# Patient Record
Sex: FEMALE | Race: WHITE | Employment: OTHER
[De-identification: names, ages, dates, MRNs, and addresses within clinical notes are randomized per-mention and may not be internally consistent; named-entity substitution may affect disease eponyms.]

---

## 2017-01-05 PROBLEM — Z86.010 HISTORY OF ADENOMATOUS POLYP OF COLON: Status: ACTIVE | Noted: 2017-01-05

## 2017-01-05 PROBLEM — Z86.0101 HISTORY OF ADENOMATOUS POLYP OF COLON: Status: ACTIVE | Noted: 2017-01-05

## 2017-01-23 ENCOUNTER — OFFICE VISIT (OUTPATIENT)
Dept: FAMILY MEDICINE CLINIC | Facility: CLINIC | Age: 62
End: 2017-01-23

## 2017-01-23 VITALS
HEART RATE: 68 BPM | DIASTOLIC BLOOD PRESSURE: 80 MMHG | RESPIRATION RATE: 18 BRPM | WEIGHT: 214 LBS | SYSTOLIC BLOOD PRESSURE: 138 MMHG | BODY MASS INDEX: 32.43 KG/M2 | HEIGHT: 68 IN

## 2017-01-23 DIAGNOSIS — F41.9 ANXIETY: ICD-10-CM

## 2017-01-23 DIAGNOSIS — Z98.890 S/P COLONOSCOPY WITH POLYPECTOMY: Primary | ICD-10-CM

## 2017-01-23 DIAGNOSIS — F17.200 SMOKING: ICD-10-CM

## 2017-01-23 PROCEDURE — 99214 OFFICE O/P EST MOD 30 MIN: CPT | Performed by: NURSE PRACTITIONER

## 2017-01-23 RX ORDER — TOLTERODINE TARTRATE 2 MG/1
2 TABLET, EXTENDED RELEASE ORAL 2 TIMES DAILY
Qty: 180 TABLET | Refills: 1 | Status: SHIPPED | OUTPATIENT
Start: 2017-01-23 | End: 2017-07-08 | Stop reason: SDUPTHER

## 2017-01-24 RX ORDER — ALPRAZOLAM 0.5 MG/1
TABLET ORAL
Qty: 90 TABLET | Refills: 0 | Status: SHIPPED | OUTPATIENT
Start: 2017-01-24 | End: 2017-04-24 | Stop reason: SDUPTHER

## 2017-01-24 ASSESSMENT — ENCOUNTER SYMPTOMS
COUGH: 0
DIARRHEA: 0
EYES NEGATIVE: 1
ABDOMINAL DISTENTION: 0
SHORTNESS OF BREATH: 0
SORE THROAT: 0
CONSTIPATION: 0

## 2017-04-11 ENCOUNTER — NURSE ONLY (OUTPATIENT)
Dept: PRIMARY CARE CLINIC | Age: 62
End: 2017-04-11
Payer: COMMERCIAL

## 2017-04-11 VITALS
HEIGHT: 68 IN | TEMPERATURE: 98.4 F | WEIGHT: 219.9 LBS | BODY MASS INDEX: 33.33 KG/M2 | RESPIRATION RATE: 16 BRPM | HEART RATE: 85 BPM | OXYGEN SATURATION: 95 % | DIASTOLIC BLOOD PRESSURE: 79 MMHG | SYSTOLIC BLOOD PRESSURE: 113 MMHG

## 2017-04-11 DIAGNOSIS — Z23 NEED FOR TETANUS BOOSTER: Primary | ICD-10-CM

## 2017-04-11 PROCEDURE — 90471 IMMUNIZATION ADMIN: CPT | Performed by: NURSE PRACTITIONER

## 2017-04-11 PROCEDURE — 90715 TDAP VACCINE 7 YRS/> IM: CPT | Performed by: NURSE PRACTITIONER

## 2017-04-20 DIAGNOSIS — F41.9 ANXIETY: ICD-10-CM

## 2017-04-24 RX ORDER — AMLODIPINE BESYLATE 10 MG/1
10 TABLET ORAL DAILY
Qty: 90 TABLET | Refills: 1 | Status: SHIPPED | OUTPATIENT
Start: 2017-04-24 | End: 2017-07-12 | Stop reason: SDUPTHER

## 2017-04-24 RX ORDER — ALPRAZOLAM 0.5 MG/1
TABLET ORAL
Qty: 90 TABLET | Refills: 0 | Status: SHIPPED | OUTPATIENT
Start: 2017-04-24 | End: 2017-07-12 | Stop reason: SDUPTHER

## 2017-05-30 ENCOUNTER — OFFICE VISIT (OUTPATIENT)
Dept: PRIMARY CARE CLINIC | Age: 62
End: 2017-05-30
Payer: COMMERCIAL

## 2017-05-30 VITALS
HEART RATE: 72 BPM | RESPIRATION RATE: 20 BRPM | OXYGEN SATURATION: 97 % | SYSTOLIC BLOOD PRESSURE: 170 MMHG | BODY MASS INDEX: 32.43 KG/M2 | WEIGHT: 214 LBS | TEMPERATURE: 98.3 F | DIASTOLIC BLOOD PRESSURE: 107 MMHG | HEIGHT: 68 IN

## 2017-05-30 DIAGNOSIS — J01.00 ACUTE NON-RECURRENT MAXILLARY SINUSITIS: Primary | ICD-10-CM

## 2017-05-30 DIAGNOSIS — T36.95XA ANTIBIOTIC-INDUCED YEAST INFECTION: ICD-10-CM

## 2017-05-30 DIAGNOSIS — B37.9 ANTIBIOTIC-INDUCED YEAST INFECTION: ICD-10-CM

## 2017-05-30 PROCEDURE — 99213 OFFICE O/P EST LOW 20 MIN: CPT | Performed by: NURSE PRACTITIONER

## 2017-05-30 RX ORDER — FLUCONAZOLE 150 MG/1
TABLET ORAL
Qty: 2 TABLET | Refills: 0 | Status: SHIPPED | OUTPATIENT
Start: 2017-05-30 | End: 2017-07-12 | Stop reason: ALTCHOICE

## 2017-05-30 RX ORDER — AMOXICILLIN AND CLAVULANATE POTASSIUM 875; 125 MG/1; MG/1
1 TABLET, FILM COATED ORAL 2 TIMES DAILY
Qty: 20 TABLET | Refills: 0 | Status: SHIPPED | OUTPATIENT
Start: 2017-05-30 | End: 2017-06-09

## 2017-05-30 RX ORDER — FLUTICASONE PROPIONATE 50 MCG
SPRAY, SUSPENSION (ML) NASAL
Qty: 1 BOTTLE | Refills: 0 | Status: SHIPPED | OUTPATIENT
Start: 2017-05-30 | End: 2017-07-12 | Stop reason: ALTCHOICE

## 2017-05-30 ASSESSMENT — ENCOUNTER SYMPTOMS
HEARTBURN: 0
DIARRHEA: 0
RHINORRHEA: 1
VOMITING: 0
COUGH: 1
SINUS PRESSURE: 1
SORE THROAT: 0
NAUSEA: 0
WHEEZING: 0
HEMOPTYSIS: 0
SHORTNESS OF BREATH: 0

## 2017-07-12 ENCOUNTER — OFFICE VISIT (OUTPATIENT)
Dept: FAMILY MEDICINE CLINIC | Age: 62
End: 2017-07-12
Payer: COMMERCIAL

## 2017-07-12 VITALS
BODY MASS INDEX: 32.43 KG/M2 | OXYGEN SATURATION: 97 % | DIASTOLIC BLOOD PRESSURE: 80 MMHG | HEART RATE: 77 BPM | RESPIRATION RATE: 18 BRPM | WEIGHT: 214 LBS | HEIGHT: 68 IN | TEMPERATURE: 98.3 F | SYSTOLIC BLOOD PRESSURE: 136 MMHG

## 2017-07-12 DIAGNOSIS — Z11.59 NEED FOR HEPATITIS C SCREENING TEST: ICD-10-CM

## 2017-07-12 DIAGNOSIS — I10 ESSENTIAL HYPERTENSION: Primary | ICD-10-CM

## 2017-07-12 DIAGNOSIS — F41.9 ANXIETY: ICD-10-CM

## 2017-07-12 DIAGNOSIS — L93.0 DISCOID LUPUS: ICD-10-CM

## 2017-07-12 DIAGNOSIS — Z13.220 SCREENING CHOLESTEROL LEVEL: ICD-10-CM

## 2017-07-12 PROCEDURE — 99214 OFFICE O/P EST MOD 30 MIN: CPT | Performed by: NURSE PRACTITIONER

## 2017-07-12 RX ORDER — TOLTERODINE TARTRATE 2 MG/1
2 TABLET, EXTENDED RELEASE ORAL 2 TIMES DAILY
Qty: 180 TABLET | Refills: 1 | Status: SHIPPED | OUTPATIENT
Start: 2017-07-12 | End: 2017-10-04 | Stop reason: SDUPTHER

## 2017-07-12 RX ORDER — ALPRAZOLAM 0.5 MG/1
TABLET ORAL
Qty: 90 TABLET | Refills: 0 | Status: SHIPPED | OUTPATIENT
Start: 2017-07-12 | End: 2017-10-04 | Stop reason: SDUPTHER

## 2017-07-12 RX ORDER — AMLODIPINE BESYLATE 10 MG/1
10 TABLET ORAL DAILY
Qty: 90 TABLET | Refills: 1 | Status: CANCELLED | OUTPATIENT
Start: 2017-07-12

## 2017-07-12 RX ORDER — AMLODIPINE BESYLATE 10 MG/1
10 TABLET ORAL DAILY
Qty: 90 TABLET | Refills: 1 | Status: SHIPPED | OUTPATIENT
Start: 2017-07-12 | End: 2017-10-04 | Stop reason: SDUPTHER

## 2017-07-12 RX ORDER — TOLTERODINE TARTRATE 2 MG/1
TABLET, EXTENDED RELEASE ORAL
Qty: 180 TABLET | Refills: 1 | Status: CANCELLED | OUTPATIENT
Start: 2017-07-12

## 2017-07-12 RX ORDER — ALPRAZOLAM 0.5 MG/1
TABLET ORAL
Qty: 90 TABLET | Refills: 0 | Status: CANCELLED | OUTPATIENT
Start: 2017-07-12

## 2017-07-12 ASSESSMENT — PATIENT HEALTH QUESTIONNAIRE - PHQ9
SUM OF ALL RESPONSES TO PHQ9 QUESTIONS 1 & 2: 0
2. FEELING DOWN, DEPRESSED OR HOPELESS: 0
SUM OF ALL RESPONSES TO PHQ QUESTIONS 1-9: 0
1. LITTLE INTEREST OR PLEASURE IN DOING THINGS: 0

## 2017-07-12 ASSESSMENT — ENCOUNTER SYMPTOMS
CHEST TIGHTNESS: 0
WHEEZING: 0
RHINORRHEA: 0
SINUS PRESSURE: 0
SORE THROAT: 0
COUGH: 1

## 2017-07-24 ENCOUNTER — HOSPITAL ENCOUNTER (OUTPATIENT)
Age: 62
Discharge: HOME | End: 2017-07-24
Payer: COMMERCIAL

## 2017-07-24 DIAGNOSIS — Z13.220 SCREENING CHOLESTEROL LEVEL: ICD-10-CM

## 2017-07-24 DIAGNOSIS — Z11.59 NEED FOR HEPATITIS C SCREENING TEST: ICD-10-CM

## 2017-07-24 DIAGNOSIS — L93.0 DISCOID LUPUS: ICD-10-CM

## 2017-07-24 LAB
CHOLESTEROL/HDL RATIO: 2.6
CHOLESTEROL: 211 MG/DL
HDLC SERPL-MCNC: 80 MG/DL
LDL CHOLESTEROL: 113 MG/DL (ref 0–130)
PATIENT FASTING?: YES
TRIGL SERPL-MCNC: 89 MG/DL
VLDLC SERPL CALC-MCNC: ABNORMAL MG/DL (ref 1–30)

## 2017-07-24 PROCEDURE — 86803 HEPATITIS C AB TEST: CPT

## 2017-07-24 PROCEDURE — 36415 COLL VENOUS BLD VENIPUNCTURE: CPT

## 2017-07-24 PROCEDURE — 80061 LIPID PANEL: CPT

## 2017-07-25 LAB — HEPATITIS C ANTIBODY: NONREACTIVE

## 2017-07-26 ENCOUNTER — TELEPHONE (OUTPATIENT)
Dept: FAMILY MEDICINE CLINIC | Age: 62
End: 2017-07-26

## 2017-07-26 DIAGNOSIS — Z51.81 ENCOUNTER FOR MONITORING STATIN THERAPY: Primary | ICD-10-CM

## 2017-07-26 DIAGNOSIS — E78.00 HYPERCHOLESTEROLEMIA: ICD-10-CM

## 2017-07-26 DIAGNOSIS — Z79.899 ENCOUNTER FOR MONITORING STATIN THERAPY: Primary | ICD-10-CM

## 2017-07-26 RX ORDER — ATORVASTATIN CALCIUM 10 MG/1
10 TABLET, FILM COATED ORAL DAILY
Qty: 90 TABLET | Refills: 0 | Status: SHIPPED | OUTPATIENT
Start: 2017-07-26 | End: 2018-05-16 | Stop reason: ALTCHOICE

## 2017-10-04 ENCOUNTER — OFFICE VISIT (OUTPATIENT)
Dept: FAMILY MEDICINE CLINIC | Age: 62
End: 2017-10-04
Payer: COMMERCIAL

## 2017-10-04 VITALS
WEIGHT: 213 LBS | DIASTOLIC BLOOD PRESSURE: 76 MMHG | OXYGEN SATURATION: 98 % | HEIGHT: 68 IN | HEART RATE: 65 BPM | BODY MASS INDEX: 32.28 KG/M2 | SYSTOLIC BLOOD PRESSURE: 134 MMHG | RESPIRATION RATE: 18 BRPM

## 2017-10-04 DIAGNOSIS — Z23 INFLUENZA VACCINE NEEDED: ICD-10-CM

## 2017-10-04 DIAGNOSIS — Z13.0 SCREENING, ANEMIA, DEFICIENCY, IRON: ICD-10-CM

## 2017-10-04 DIAGNOSIS — L93.0 DISCOID LUPUS: ICD-10-CM

## 2017-10-04 DIAGNOSIS — Z80.3 FAMILY HISTORY OF BREAST CANCER: ICD-10-CM

## 2017-10-04 DIAGNOSIS — Z12.39 SCREENING FOR BREAST CANCER: ICD-10-CM

## 2017-10-04 DIAGNOSIS — I10 ESSENTIAL HYPERTENSION: Primary | ICD-10-CM

## 2017-10-04 DIAGNOSIS — F41.9 ANXIETY: ICD-10-CM

## 2017-10-04 PROCEDURE — 90686 IIV4 VACC NO PRSV 0.5 ML IM: CPT | Performed by: NURSE PRACTITIONER

## 2017-10-04 PROCEDURE — 90471 IMMUNIZATION ADMIN: CPT | Performed by: NURSE PRACTITIONER

## 2017-10-04 PROCEDURE — 99214 OFFICE O/P EST MOD 30 MIN: CPT | Performed by: NURSE PRACTITIONER

## 2017-10-04 RX ORDER — AMLODIPINE BESYLATE 10 MG/1
10 TABLET ORAL DAILY
Qty: 90 TABLET | Refills: 1 | Status: SHIPPED | OUTPATIENT
Start: 2017-10-04 | End: 2018-03-20 | Stop reason: SDUPTHER

## 2017-10-04 RX ORDER — TOLTERODINE TARTRATE 2 MG/1
2 TABLET, EXTENDED RELEASE ORAL 2 TIMES DAILY
Qty: 180 TABLET | Refills: 1 | Status: SHIPPED | OUTPATIENT
Start: 2017-10-04 | End: 2018-03-20 | Stop reason: SDUPTHER

## 2017-10-04 RX ORDER — IBUPROFEN 800 MG/1
800 TABLET ORAL DAILY PRN
Qty: 90 TABLET | Refills: 1 | Status: SHIPPED | OUTPATIENT
Start: 2017-10-04 | End: 2018-03-20 | Stop reason: SDUPTHER

## 2017-10-04 RX ORDER — ALPRAZOLAM 0.5 MG/1
TABLET ORAL
Qty: 90 TABLET | Refills: 0 | Status: SHIPPED | OUTPATIENT
Start: 2017-10-04 | End: 2018-01-09 | Stop reason: SDUPTHER

## 2017-10-04 ASSESSMENT — ENCOUNTER SYMPTOMS
EYE PAIN: 0
COUGH: 0
BACK PAIN: 0
SINUS PRESSURE: 0
EYES NEGATIVE: 1
WHEEZING: 0
ABDOMINAL DISTENTION: 0
SHORTNESS OF BREATH: 0

## 2017-10-04 NOTE — PROGRESS NOTES
After obtaining consent, and per orders of [facility], injection of Fluzone given in Right Deltoid by Tam Syed. Patient instructed to remain in clinic for 20 minutes afterwards, and to report any adverse reaction to me immediately. No reaction noted.

## 2017-10-04 NOTE — PROGRESS NOTES
[facility] PHYSICIANS  [facility] PRIMARY CARE OF [place]  [address] [address]  Dept: [phone]  Dept Fax: [phone]    Last encounter Visit date not found  Chronic Disease Visit Information    BP Readings from Last 3 Encounters:   10/04/17 134/76   07/12/17 136/80   05/30/17 (!) 170/107          LDL Cholesterol (mg/dL)   Date Value   07/24/2017 113     HDL (mg/dL)   Date Value   07/24/2017 80     BUN (mg/dL)   Date Value   04/06/2016 26 (H)     CREATININE (mg/dL)   Date Value   04/06/2016 0.85     Glucose (mg/dL)   Date Value   04/06/2016 99            Have you changed or started any medications since your last visit including any over-the-counter medicines, vitamins, or herbal medicines? no   Are you having any side effects from any of your medications? -  no  Have you stopped taking any of your medications? Is so, why? -  yes - Statin     Have you seen any other physician or provider since your last visit? Yes - Records Obtained  Have you had any other diagnostic tests since your last visit? No  Have you been seen in the emergency room and/or had an admission to a hospital since we last saw you? No  Have you had your annual diabetic retinal (eye) exam? No  Have you had your routine dental cleaning in the past 6 months? yes - Henok Pichardo and justynan     Have you activated your [employer] account? If not, what are your barriers?  No: Discussed      Patient Care Team:  Debbe Leventhal, NP as PCP - General (Certified Nurse Practitioner)         Medical History Review  Past Medical, Family, and Social History reviewed and does contribute to the patient presenting condition    Health Maintenance   Topic Date Due    Cervical cancer screen  06/16/2017    HIV screen  08/19/2020 (Originally 9/20/1970)    Breast cancer screen  08/05/2018    Colon cancer screen colonoscopy  12/16/2021    Lipid screen  07/24/2022    DTaP/Tdap/Td vaccine (2 - Td) 04/12/2027    Zostavax vaccine  Completed    Flu Alcohol Abuse Maternal Grandfather     Diabetes Paternal Grandfather     Other Maternal Aunt      All maternal aunts had heart disease       Social History   Substance Use Topics    Smoking status: Current Every Day Smoker     Packs/day: 0.50     Types: Cigarettes    Smokeless tobacco: Never Used    Alcohol use Yes      Comment: occasionally      Current Outpatient Prescriptions   Medication Sig Dispense Refill    amLODIPine (NORVASC) 10 MG tablet Take 1 tablet by mouth daily 90 tablet 1    ibuprofen (ADVIL;MOTRIN) 800 MG tablet Take 1 tablet by mouth daily as needed for Pain 90 tablet 1    tolterodine (DETROL) 2 MG tablet Take 1 tablet by mouth 2 times daily 180 tablet 1    ALPRAZolam (XANAX) 0.5 MG tablet take 1 tablet by mouth daily if needed anxiety 90 tablet 0    NONFORMULARY Indications: Womens 1 a day 50 plus.  NONFORMULARY Indications: Natures way hair skin and nails  2500 mcg 1 a day.  vitamin B-12 (CYANOCOBALAMIN) 500 MCG tablet Take 500 mcg by mouth daily      Polypodium Leucotomos (HELIOCARE PO) Take 1 tablet by mouth daily      MIRVASO 0.33 % GEL       Magnesium 400 MG CAPS Take  by mouth daily.  Loratadine 10 MG CAPS Take  by mouth daily as needed.  aspirin 81 MG tablet Take 81 mg by mouth daily.  hydroxychloroquine (PLAQUENIL) 200 MG tablet Take 200 mg by mouth daily.  fish oil-omega-3 fatty acids 1000 MG capsule Take 1 g by mouth 2 times daily.  vitamin D (CHOLECALCIFEROL) 400 UNITS TABS tablet Take  by mouth daily. 2000 mg once daily      calcium-vitamin D (OSCAL) 250-125 MG-UNIT per tablet Take 1 tablet by mouth daily. Vit D and Vit K 750 mg daily      vitamin E 1000 UNITS capsule Take  by mouth daily.  400IU      atorvastatin (LIPITOR) 10 MG tablet Take 1 tablet by mouth daily 90 tablet 0    betamethasone valerate (VALISONE) 0.1 % ointment Apply topically 2 times daily prn poison ivy 15 g 0    Ascorbic Acid (VITAMIN C) 500 MG tablet Take 500 anemia, deficiency, iron  Due to plaquenil use   Denies blood in stool or urine  - CBC Auto Differential; Future    6. Influenza vaccine needed  Given today  - INFLUENZA, QUADV, 3 YRS AND OLDER, IM, PF, PREFILL SYR OR SDV, 0.5ML (FLUZONE QUADV, PF)    7. Discoid lupus  Monitored by rheumatologist doing well      See in 3 months for monitoring of benzo. Completed Refills   Requested Prescriptions     Signed Prescriptions Disp Refills    amLODIPine (NORVASC) 10 MG tablet 90 tablet 1     Sig: Take 1 tablet by mouth daily    ibuprofen (ADVIL;MOTRIN) 800 MG tablet 90 tablet 1     Sig: Take 1 tablet by mouth daily as needed for Pain    tolterodine (DETROL) 2 MG tablet 180 tablet 1     Sig: Take 1 tablet by mouth 2 times daily    ALPRAZolam (XANAX) 0.5 MG tablet 90 tablet 0     Sig: take 1 tablet by mouth daily if needed anxiety     No Follow-up on file.   Orders Placed This Encounter   Medications    amLODIPine (NORVASC) 10 MG tablet     Sig: Take 1 tablet by mouth daily     Dispense:  90 tablet     Refill:  1    ibuprofen (ADVIL;MOTRIN) 800 MG tablet     Sig: Take 1 tablet by mouth daily as needed for Pain     Dispense:  90 tablet     Refill:  1    tolterodine (DETROL) 2 MG tablet     Sig: Take 1 tablet by mouth 2 times daily     Dispense:  180 tablet     Refill:  1    ALPRAZolam (XANAX) 0.5 MG tablet     Sig: take 1 tablet by mouth daily if needed anxiety     Dispense:  90 tablet     Refill:  0     Orders Placed This Encounter   Procedures    FADI DIGITAL SCREEN W OR WO CAD BILATERAL     Standing Status:   Future     Standing Expiration Date:   12/4/2018     Order Specific Question:   Reason for exam:     Answer:   screening    INFLUENZA, QUADV, 3 YRS AND OLDER, IM, PF, PREFILL SYR OR SDV, 0.5ML (FLUZONE QUADV, PF)    CBC Auto Differential     Standing Status:   Future     Standing Expiration Date:   10/4/2018         Sari  received counseling on the following healthy behaviors: nutrition, exercise, medication adherence and tobacco cessation  Reviewed prior labs and health maintenance. Continue current medications, diet and exercise. Discussed use, benefit, and side effects of prescribed medications. Barriers to medication compliance addressed. Patient given educational materials - see patient instructions. All patient questions answered. Patient voiced understanding.           Electronically signed by Tammi Chavez NP on 10/5/2017 at 1:44 PM

## 2017-10-12 ENCOUNTER — HOSPITAL ENCOUNTER (OUTPATIENT)
Dept: MAMMOGRAPHY | Age: 62
Discharge: HOME | End: 2017-10-12
Payer: COMMERCIAL

## 2017-10-12 DIAGNOSIS — Z12.39 SCREENING FOR BREAST CANCER: ICD-10-CM

## 2017-10-12 DIAGNOSIS — Z80.3 FAMILY HISTORY OF BREAST CANCER: ICD-10-CM

## 2017-10-12 PROCEDURE — G0202 SCR MAMMO BI INCL CAD: HCPCS

## 2017-12-12 ENCOUNTER — HOSPITAL ENCOUNTER (OUTPATIENT)
Age: 62
Discharge: HOME | End: 2017-12-12
Payer: COMMERCIAL

## 2017-12-12 DIAGNOSIS — Z13.0 SCREENING, ANEMIA, DEFICIENCY, IRON: ICD-10-CM

## 2017-12-12 LAB
ABSOLUTE EOS #: 0.1 K/UL (ref 0–0.4)
ABSOLUTE IMMATURE GRANULOCYTE: ABNORMAL K/UL (ref 0–0.3)
ABSOLUTE LYMPH #: 2 K/UL (ref 1–4.8)
ABSOLUTE MONO #: 0.8 K/UL (ref 0–1)
BASOPHILS # BLD: 0 % (ref 0–2)
BASOPHILS ABSOLUTE: 0 K/UL (ref 0–0.2)
DIFFERENTIAL TYPE: YES
EOSINOPHILS RELATIVE PERCENT: 1 % (ref 0–5)
HCT VFR BLD CALC: 41.3 % (ref 36–46)
HEMOGLOBIN: 13.9 G/DL (ref 12–16)
IMMATURE GRANULOCYTES: ABNORMAL %
LYMPHOCYTES # BLD: 22 % (ref 15–40)
MCH RBC QN AUTO: 31.2 PG (ref 26–34)
MCHC RBC AUTO-ENTMCNC: 33.7 G/DL (ref 31–37)
MCV RBC AUTO: 92.8 FL (ref 80–100)
MONOCYTES # BLD: 9 % (ref 4–8)
PDW BLD-RTO: 13.5 % (ref 12.1–15.2)
PLATELET # BLD: 223 K/UL (ref 140–450)
PLATELET ESTIMATE: ABNORMAL
PMV BLD AUTO: ABNORMAL FL (ref 6–12)
RBC # BLD: 4.45 M/UL (ref 4–5.2)
RBC # BLD: ABNORMAL 10*6/UL
SEG NEUTROPHILS: 68 % (ref 47–75)
SEGMENTED NEUTROPHILS ABSOLUTE COUNT: 6.3 K/UL (ref 2.5–7)
WBC # BLD: 9.3 K/UL (ref 3.5–11)
WBC # BLD: ABNORMAL 10*3/UL

## 2017-12-12 PROCEDURE — 85025 COMPLETE CBC W/AUTO DIFF WBC: CPT

## 2017-12-12 PROCEDURE — 36415 COLL VENOUS BLD VENIPUNCTURE: CPT

## 2017-12-19 ENCOUNTER — OFFICE VISIT (OUTPATIENT)
Dept: FAMILY MEDICINE CLINIC | Age: 62
End: 2017-12-19
Payer: COMMERCIAL

## 2017-12-19 VITALS
HEART RATE: 71 BPM | OXYGEN SATURATION: 96 % | BODY MASS INDEX: 32.74 KG/M2 | HEIGHT: 68 IN | DIASTOLIC BLOOD PRESSURE: 76 MMHG | WEIGHT: 216 LBS | SYSTOLIC BLOOD PRESSURE: 120 MMHG | RESPIRATION RATE: 18 BRPM

## 2017-12-19 DIAGNOSIS — I10 ESSENTIAL HYPERTENSION: Primary | ICD-10-CM

## 2017-12-19 DIAGNOSIS — E66.09 CLASS 1 OBESITY DUE TO EXCESS CALORIES WITHOUT SERIOUS COMORBIDITY WITH BODY MASS INDEX (BMI) OF 31.0 TO 31.9 IN ADULT: ICD-10-CM

## 2017-12-19 DIAGNOSIS — F41.9 ANXIETY: ICD-10-CM

## 2017-12-19 PROCEDURE — 3014F SCREEN MAMMO DOC REV: CPT | Performed by: NURSE PRACTITIONER

## 2017-12-19 PROCEDURE — G8484 FLU IMMUNIZE NO ADMIN: HCPCS | Performed by: NURSE PRACTITIONER

## 2017-12-19 PROCEDURE — 99213 OFFICE O/P EST LOW 20 MIN: CPT | Performed by: NURSE PRACTITIONER

## 2017-12-19 PROCEDURE — 3017F COLORECTAL CA SCREEN DOC REV: CPT | Performed by: NURSE PRACTITIONER

## 2017-12-19 PROCEDURE — G8417 CALC BMI ABV UP PARAM F/U: HCPCS | Performed by: NURSE PRACTITIONER

## 2017-12-19 PROCEDURE — G8427 DOCREV CUR MEDS BY ELIG CLIN: HCPCS | Performed by: NURSE PRACTITIONER

## 2017-12-19 PROCEDURE — 1036F TOBACCO NON-USER: CPT | Performed by: NURSE PRACTITIONER

## 2017-12-19 NOTE — PROGRESS NOTES
[facility] PHYSICIANS  [facility] PRIMARY CARE  [place]  [address] [place] [address] [place]  [address]  Dept: [phone]  Dept Fax: [phone]    Last encounter 10/4/2017  Visit Information    Have you changed or started any medications since your last visit including any over-the-counter medicines, vitamins, or herbal medicines? yes - CoQ 10 mg Biotin Bi 1000mg   Are you having any side effects from any of your medications? -  no  Have you stopped taking any of your medications? Is so, why? -  no    Have you seen any other physician or provider since your last visit? Yes - Records Obtained  Have you had any other diagnostic tests since your last visit? Yes - Records Obtained  Have you been seen in the emergency room and/or had an admission to a hospital since we last saw you? No  Have you had your routine dental cleaning in the past 6 months? yes - Dr Cesar Mazariegos     Have you activated your [employer] account? If not, what are your barriers? No: Discussed      Patient Care Team:  Chrissy Aviles NP as PCP - General (Certified Nurse Practitioner)    Medical History Review  Past Medical, Family, and Social History reviewed and does contribute to the patient presenting condition    Health Maintenance   Topic Date Due    Cervical cancer screen  06/16/2017    HIV screen  08/19/2020 (Originally 9/20/1970)    Breast cancer screen  10/12/2019    Colon cancer screen colonoscopy  12/16/2021    Lipid screen  07/24/2022    DTaP/Tdap/Td vaccine (2 - Td) 04/12/2027    Zostavax vaccine  Completed    Flu vaccine  Completed    Pneumococcal med risk  Completed    Hepatitis C screen  Completed       HPI:   Latrell Castro is a 57 y.o. female who presents today for her medical conditions/complaints as noted below. Latrell Castro is c/o of Check-Up (Medication check up)      HPI    DR. Lomax. Discoid lupus, Dr. Brandy Bustos dermatologist alessio. Eveline Never with eye exam recently.      Dr. Tosha Rodríguez right hand thumb fusion and lack tendons and arthritis. Wearing sling today but continues to participate and strengthening exercises at local gym    No smoking for 2 months, keeping self busy but for best bone health stopped. Going to gym routinely.      Dysphoric mood and anxiety treated with Xanax 0.5 mg daily patient is tolerating well with good control refills have been appropriate on ors review will continue daily dosing    Vitamin B12 supplement being taken due to low meat intake    Vitamin D deficiency with supplement daily    Hyperlipidemia with refusal to take Lipitor 10 mg once to try is much natural possible and is refusing statin at this time is aware that is at elevated risk and continues to modify with diet and exercise    Taking tolterodine for urinary incontinence which has had a good effect    Hypertension on amlodipine denies any headaches chest pain dizziness or shortness of breath        Past Medical History:   Diagnosis Date    Arthritis     follow with Dr. Rudolph Burns Discoid lupus     Hay fever     Hormone disorder     Hypertension     Lipoma 2009    Rosacea       Past Surgical History:   Procedure Laterality Date    BACK SURGERY  1988    L-5, [facility] @ [address]      COLONOSCOPY  12/16/2016    Dr. Seferino Pollack:  1 adenomatous polyp    FOOT SURGERY      HAMMER TOE SURGERY Right 09-23-14    2nd toe, bunion surgery great right toe    HYSTERECTOMY      LIPOMA RESECTION  2009       Family History   Problem Relation Age of Onset    Heart Disease Mother     High Cholesterol Mother     High Blood Pressure Mother     Heart Surgery Mother     Other Mother      Heart Cath     Heart Disease Father     Heart Failure Father     Alcohol Abuse Maternal Grandfather     Diabetes Paternal Grandfather     Other Maternal Aunt      All maternal aunts had heart disease       Social History   Substance Use Topics    Smoking status: Former Smoker     Packs/day: 0.50     Types: Cigarettes    Smokeless tobacco: Never Used    Alcohol use Yes      Comment: occasionally      Current Outpatient Prescriptions   Medication Sig Dispense Refill    amLODIPine (NORVASC) 10 MG tablet Take 1 tablet by mouth daily 90 tablet 1    ibuprofen (ADVIL;MOTRIN) 800 MG tablet Take 1 tablet by mouth daily as needed for Pain 90 tablet 1    tolterodine (DETROL) 2 MG tablet Take 1 tablet by mouth 2 times daily 180 tablet 1    ALPRAZolam (XANAX) 0.5 MG tablet take 1 tablet by mouth daily if needed anxiety 90 tablet 0    NONFORMULARY Indications: Womens 1 a day 50 plus.  NONFORMULARY Indications: Natures way hair skin and nails  2500 mcg 1 a day.  vitamin B-12 (CYANOCOBALAMIN) 500 MCG tablet Take 500 mcg by mouth daily      Polypodium Leucotomos (HELIOCARE PO) Take 1 tablet by mouth daily      MIRVASO 0.33 % GEL       Magnesium 400 MG CAPS Take  by mouth daily.  Loratadine 10 MG CAPS Take  by mouth daily as needed.  aspirin 81 MG tablet Take 81 mg by mouth daily.  hydroxychloroquine (PLAQUENIL) 200 MG tablet Take 200 mg by mouth daily.  fish oil-omega-3 fatty acids 1000 MG capsule Take 1 g by mouth 2 times daily.  vitamin D (CHOLECALCIFEROL) 400 UNITS TABS tablet Take  by mouth daily. 2000 mg once daily      vitamin E 1000 UNITS capsule Take  by mouth daily. 400IU      Ascorbic Acid (VITAMIN C) 500 MG tablet Take 500 mg by mouth daily.  atorvastatin (LIPITOR) 10 MG tablet Take 1 tablet by mouth daily 90 tablet 0    betamethasone valerate (VALISONE) 0.1 % ointment Apply topically 2 times daily prn poison ivy 15 g 0    calcium-vitamin D (OSCAL) 250-125 MG-UNIT per tablet Take 1 tablet by mouth daily. Vit D and Vit K 750 mg daily       No current facility-administered medications for this visit.       Allergies   Allergen Reactions    Augmentin [Amoxicillin-Pot Clavulanate]      Gave Diarhhea      Percocet [Oxycodone-Acetaminophen] Itching       Health Maintenance   Topic Date Due    Cervical cancer screen  06/16/2017    HIV screen  08/19/2020 (Originally 9/20/1970)    Breast cancer screen  10/12/2019    Colon cancer screen colonoscopy  12/16/2021    Lipid screen  07/24/2022    DTaP/Tdap/Td vaccine (2 - Td) 04/12/2027    Zostavax vaccine  Completed    Flu vaccine  Completed    Pneumococcal med risk  Completed    Hepatitis C screen  Completed       Subjective:      Review of Systems   Constitutional: Negative for activity change, appetite change, chills and fever. HENT: Negative for congestion, ear pain, postnasal drip, sinus pressure and sore throat. Eyes: Negative. Respiratory: Negative for cough, chest tightness, shortness of breath and wheezing. Cardiovascular: Negative for chest pain, palpitations and leg swelling. Gastrointestinal: Negative for abdominal distention. Endocrine: Negative. Genitourinary: Negative for difficulty urinating. Musculoskeletal: Positive for arthralgias and back pain. Negative for joint swelling. Skin: Negative. Neurological: Negative for dizziness and headaches. Psychiatric/Behavioral: Positive for dysphoric mood. Negative for behavioral problems and sleep disturbance. The patient is nervous/anxious. Objective:     Physical Exam   Constitutional: She is oriented to person, place, and time. She appears well-developed and well-nourished. No distress. HENT:   Head: Normocephalic and atraumatic. Right Ear: External ear normal.   Left Ear: External ear normal.   Mouth/Throat: Oropharynx is clear and moist.   Eyes: EOM are normal. Pupils are equal, round, and reactive to light. No scleral icterus. Neck: Normal range of motion. Neck supple. No thyromegaly present. Cardiovascular: Normal rate, regular rhythm and normal heart sounds. No murmur heard. No carotid bruit bilaterally   Pulmonary/Chest: Effort normal and breath sounds normal. No respiratory distress. She has no wheezes. Abdominal: Soft.  Bowel sounds are normal. There is no tenderness. Musculoskeletal: Normal range of motion. She exhibits no edema or tenderness. Lymphadenopathy:     She has no cervical adenopathy. Neurological: She is alert and oriented to person, place, and time. No cranial nerve deficit. Skin: Skin is warm and dry. No rash noted. Psychiatric: She has a normal mood and affect. Her behavior is normal. Judgment and thought content normal.   Nursing note and vitals reviewed. /76 (Site: Left Arm, Position: Sitting, Cuff Size: Medium Adult)   Pulse 71   Resp 18   Ht 5' 8\" (1.727 m)   Wt 216 lb (98 kg)   LMP 05/20/2012   SpO2 96%   BMI 32.84 kg/m²     Data:     Lab Results   Component Value Date     04/06/2016    K 4.4 04/06/2016    CL 98 04/06/2016    CO2 27 04/06/2016    BUN 26 04/06/2016    CREATININE 0.85 04/06/2016    GLUCOSE 99 04/06/2016    PROT 7.4 04/06/2016    LABALBU 4.6 04/06/2016    LABALBU 4.3 10/19/2011    BILITOT 0.37 04/06/2016    ALKPHOS 61 04/06/2016    AST 24 04/06/2016    ALT 22 04/06/2016     Lab Results   Component Value Date    WBC 9.3 12/12/2017    RBC 4.45 12/12/2017    RBC 4.54 10/19/2011    HGB 13.9 12/12/2017    HCT 41.3 12/12/2017    MCV 92.8 12/12/2017    MCH 31.2 12/12/2017    MCHC 33.7 12/12/2017    RDW 13.5 12/12/2017     12/12/2017     10/19/2011    MPV NOT REPORTED 12/12/2017     Lab Results   Component Value Date    TSH 2.24 08/26/2013     Lab Results   Component Value Date    CHOL 211 07/24/2017    HDL 80 07/24/2017          Assessment & Plan     1. Essential hypertension  Stable and controlled  continue with weight loss  Avoid salt in diet  Stays active at the gym    2. Anxiety  Able and controlled  Oaars report done    3.  Class 1 obesity due to excess calories without serious comorbidity with body mass index (BMI) of 31.0 to 31.9 in adult          See in 3 months            Completed Refills   Requested Prescriptions      No prescriptions requested or ordered in this encounter     No Follow-up on file. No orders of the defined types were placed in this encounter. No orders of the defined types were placed in this encounter. Beba Aguila received counseling on the following healthy behaviors: nutrition, exercise and medication adherence  Reviewed prior labs and health maintenance. Continue current medications, diet and exercise. Discussed use, benefit, and side effects of prescribed medications. Barriers to medication compliance addressed. Patient given educational materials - see patient instructions. All patient questions answered. Patient voiced understanding.           Electronically signed by Carey Alvarado NP on 12/22/2017 at 5:03 PM

## 2017-12-22 ASSESSMENT — ENCOUNTER SYMPTOMS
ABDOMINAL DISTENTION: 0
SORE THROAT: 0
SHORTNESS OF BREATH: 0
COUGH: 0
BACK PAIN: 1
EYES NEGATIVE: 1
CHEST TIGHTNESS: 0
WHEEZING: 0
SINUS PRESSURE: 0

## 2018-01-09 ENCOUNTER — TELEPHONE (OUTPATIENT)
Dept: FAMILY MEDICINE CLINIC | Age: 63
End: 2018-01-09

## 2018-01-09 DIAGNOSIS — F41.9 ANXIETY: ICD-10-CM

## 2018-01-09 RX ORDER — ALPRAZOLAM 0.5 MG/1
TABLET ORAL
Qty: 30 TABLET | Refills: 0 | Status: SHIPPED | OUTPATIENT
Start: 2018-01-09 | End: 2018-03-20 | Stop reason: SDUPTHER

## 2018-01-09 RX ORDER — ALPRAZOLAM 0.5 MG/1
TABLET ORAL
Qty: 90 TABLET | Refills: 0 | Status: SHIPPED | OUTPATIENT
Start: 2018-01-09 | End: 2018-01-09 | Stop reason: SDUPTHER

## 2018-01-11 ENCOUNTER — HOSPITAL ENCOUNTER (OUTPATIENT)
Dept: OCCUPATIONAL THERAPY | Age: 63
Setting detail: THERAPIES SERIES
Discharge: HOME | End: 2018-01-11
Payer: COMMERCIAL

## 2018-01-11 PROCEDURE — 97530 THERAPEUTIC ACTIVITIES: CPT

## 2018-01-11 PROCEDURE — 97166 OT EVAL MOD COMPLEX 45 MIN: CPT

## 2018-01-11 NOTE — PLAN OF CARE
Phone: [address]         Fax: [phone]                       Outpatient Occupational Therapy                                                                         Plan of Care    Date: 2018  Patient: Miguel Malik  : 1955  ACCT #: [de-identified]    [facility]#: [id number]  Referring Practitioner: Dr. Eve Ponce    Diagnosis: R thumb Aia 16 arthrodesis/fusion   Additional Pertinent Hx: R thumb CMC arthrodesis/fusion completed on 17. Patient was in a cast for 1 week after surgery. Patient then wore a brace for 6 weeks after cast removal.  Patient reports she is only wearing brace when she goes out into the community, when she sleeps, and when she goes to the gym. Treatment Diagnosis: R thumb CMC arthrodesis/fusion. Total # of Visits Approved: 30 Per Physician Order  Total # of Visits to Date: 1  No Show: 0  Canceled Appointment: 0           Assessment  Performance deficits / Impairments: Decreased ROM, Decreased strength, Decreased fine motor control, Decreased high-level IADLs, Decreased coordination  Assessment: Upon arrival patient demonstrates the above deficits. Patient is hopeful to regain more AROM of the wrist/thumb as well as increase strength of the hand and thumb in order to return to daily activities. Plan to issue compression glove next treatment session if patient is agreeable.    Prognosis: Good     PLAN     Frequency: 2-3 times/wk  Duration:  6 weeks  [x] HP/CP      [] Electrical Stimulation   [x]  Strengthening    [x]  Active/Passive ROM  [] Muscle Re-education    [x] Fine Motor Coordination    [x]  Ultrasound   [x]  Splinting  [] Developmental Therapy    [] Sensory Integration [x]  Patient Education/HEP [] Visual Perception Retraining   [x] ADL Training   [] Cognitive Retraining  [x] Fluidotherapy  [x] Paraffin   [x] Therapeutic Exercise  [x] Therapeutic Activity  [] Other:    GOALS  Short term goals  Time Frame for Short term goals: 2 weeks   Short term goal 1: Patient to be independent with home program.   Long term goals  Time Frame for Long term goals : 6 weeks (POC epxires 2-22-18)  Long term goal 1: Patient to increase R wrist flexion to 45 degrees or more in order to lift weights wighout difficulty. Long term goal 2: Patient to increase R wrist RD 0-20, UD 0-40, in order to don lotion without difficulty. Long term goal 3: Patient to increase R  strength to 50# or more in order to carry heavy objects with B/L hands. Long term goal 4: Patient to increase R lateral pinch strenght to 15#, tip to 10#, palmar to 10#, or more in order to open jars and packages independently. Long term goal 5: Edema Reduction at wrist to 16.1 cm or less in order to allow for improved AROM of the wrist.       Marcello Harrell, OTR/L                    Date: 1/11/2018      _____________________________________ Date: 1/11/2018  Physician Signature    By signing above or cosigning electronically, I have reviewed this Plan of Care and certify a need for medically necessary rehabilitation services.

## 2018-01-12 ENCOUNTER — HOSPITAL ENCOUNTER (OUTPATIENT)
Dept: OCCUPATIONAL THERAPY | Age: 63
Setting detail: THERAPIES SERIES
Discharge: HOME | End: 2018-01-12
Payer: COMMERCIAL

## 2018-01-12 PROCEDURE — 97140 MANUAL THERAPY 1/> REGIONS: CPT

## 2018-01-12 PROCEDURE — 97110 THERAPEUTIC EXERCISES: CPT

## 2018-01-12 PROCEDURE — 97018 PARAFFIN BATH THERAPY: CPT

## 2018-01-12 ASSESSMENT — 9 HOLE PEG TEST
TESTTIME_SECONDS: 18
TESTTIME_SECONDS: 22

## 2018-01-15 ENCOUNTER — HOSPITAL ENCOUNTER (OUTPATIENT)
Dept: OCCUPATIONAL THERAPY | Age: 63
Setting detail: THERAPIES SERIES
Discharge: HOME | End: 2018-01-15
Payer: COMMERCIAL

## 2018-01-15 PROCEDURE — 97110 THERAPEUTIC EXERCISES: CPT

## 2018-01-15 PROCEDURE — 97018 PARAFFIN BATH THERAPY: CPT

## 2018-01-15 PROCEDURE — 97140 MANUAL THERAPY 1/> REGIONS: CPT

## 2018-01-15 NOTE — PROGRESS NOTES
Phone: [phone]         Fax: [phone]                       Outpatient Occupational Therapy                                                                            Daily Note  Date: 1/15/2018   MRN: [id number]    ACCT#: [de-identified]  Patient: Deangelo Guerra  : 1955  Referring Practitioner: Dr. Martina Ruiz    Diagnosis: R thumb Aia 16 arthrodesis/fusion         Total # of Visits Approved: 30 Per Physician Order  Total # of Visits to Date: 3  No Show: 0  Canceled Appointment: 0       Pre-Treament Pain: 0/10  Pain at present: 0  Subjective: Patient reports no pain on this date. Exercises/Modalities:  See DocFlow Sheet    Assessment  Assessment: Pt tolerated treatment session well. Continued with current treatment plan set by OTR. Pt continues to wear compression glove as instructed and is compliant with HEP. States compression glove \"makes it feel better. \" Continue to progress as tolerated. Prognosis: Good       Post Treatment Pain:   0/10    Goals  Short Term Goals  Short term goal 1: Patient to be independent with home program. - MET              Long Term Goals  Long term goal 1: Patient to increase R wrist flexion to 45 degrees or more in order to lift weights wighout difficulty. Long term goal 2: Patient to increase R wrist RD 0-20, UD 0-40, in order to don lotion without difficulty. Long term goal 3: Patient to increase R  strength to 50# or more in order to carry heavy objects with B/L hands. Long term goal 4: Patient to increase R lateral pinch strenght to 15#, tip to 10#, palmar to 10#, or more in order to open jars and packages independently.    Long term goal 5: Edema Reduction at wrist to 16.1 cm or less in order to allow for improved AROM of the wrist.           Time In: 1015  Time Out: 1055  Timed Coded Minutes: 30  Total Treatment Time: 1441 [facility]    Date: 1/15/2018

## 2018-01-17 ENCOUNTER — HOSPITAL ENCOUNTER (OUTPATIENT)
Dept: OCCUPATIONAL THERAPY | Age: 63
Setting detail: THERAPIES SERIES
Discharge: HOME | End: 2018-01-17
Payer: COMMERCIAL

## 2018-01-17 PROCEDURE — 97530 THERAPEUTIC ACTIVITIES: CPT

## 2018-01-17 PROCEDURE — 97110 THERAPEUTIC EXERCISES: CPT

## 2018-01-17 PROCEDURE — 97018 PARAFFIN BATH THERAPY: CPT

## 2018-01-17 NOTE — PROGRESS NOTES
Phone: [address]         Fax: [phone]                       Outpatient Occupational Therapy                                                                            Daily Note  Date: 2018   MRN: [id number]    ACCT#: [de-identified]  Patient: Norma Martinez  : 1955  Referring Practitioner: Dr. Elle Hughes    Diagnosis: R thumb Aia 16 arthrodesis/fusion         Total # of Visits Approved: 30 Per Physician Order  Total # of Visits to Date: 4  No Show: 0  Canceled Appointment: 0       Pre-Treament Pain:   Pain at present: 0  Subjective: Patient reports no pain       Exercises/Modalities:  See DocFlow Sheet    Assessment  Assessment: Patient tolerated treatment session well on this date. Patient continues to have edema across the thumb and thenar eminence, but reports no pain. Therapist instructed patient on yellow putty program and issued putty. Patient demonstrated independence with home program. Initiated light strengthening with hand weight, tolerated well. Will continue to progress as patient tolerates. Post Treatment Pain:   0/10    Goals  Short Term Goals  Short term goal 1: Patient to be independent with home program. - MET              Long Term Goals  Long term goal 1: Patient to increase R wrist flexion to 45 degrees or more in order to lift weights wighout difficulty. Long term goal 2: Patient to increase R wrist RD 0-20, UD 0-40, in order to don lotion without difficulty. Long term goal 3: Patient to increase R  strength to 50# or more in order to carry heavy objects with B/L hands. Long term goal 4: Patient to increase R lateral pinch strenght to 15#, tip to 10#, palmar to 10#, or more in order to open jars and packages independently.    Long term goal 5: Edema Reduction at wrist to 16.1 cm or less in order to allow for improved AROM of the wrist.           Time In: 1016  Time Out: 1059  Timed Coded Minutes: 33  Total Treatment Time: 208 [facility]  OTR/L  Date: 1/17/2018

## 2018-01-19 ENCOUNTER — HOSPITAL ENCOUNTER (OUTPATIENT)
Dept: OCCUPATIONAL THERAPY | Age: 63
Setting detail: THERAPIES SERIES
Discharge: HOME | End: 2018-01-19
Payer: COMMERCIAL

## 2018-01-19 PROCEDURE — 97018 PARAFFIN BATH THERAPY: CPT

## 2018-01-19 PROCEDURE — 97110 THERAPEUTIC EXERCISES: CPT

## 2018-01-19 PROCEDURE — 97140 MANUAL THERAPY 1/> REGIONS: CPT

## 2018-01-22 ENCOUNTER — HOSPITAL ENCOUNTER (OUTPATIENT)
Dept: OCCUPATIONAL THERAPY | Age: 63
Setting detail: THERAPIES SERIES
Discharge: HOME | End: 2018-01-22
Payer: COMMERCIAL

## 2018-01-22 PROCEDURE — 97530 THERAPEUTIC ACTIVITIES: CPT

## 2018-01-22 PROCEDURE — 97018 PARAFFIN BATH THERAPY: CPT

## 2018-01-22 PROCEDURE — 97110 THERAPEUTIC EXERCISES: CPT

## 2018-01-22 ASSESSMENT — 9 HOLE PEG TEST: TESTTIME_SECONDS: 20

## 2018-01-22 NOTE — PROGRESS NOTES
to 50# or more in order to carry heavy objects with B/L hands. Long term goal 4: Patient to increase R lateral pinch strenght to 15#, tip to 10#, palmar to 10#, or more in order to open jars and packages independently.    Long term goal 5: Edema Reduction at wrist to 16.1 cm or less in order to allow for improved AROM of the wrist.          Barbie Villanueva   OTR/L Date: 1/22/2018

## 2018-01-24 ENCOUNTER — HOSPITAL ENCOUNTER (OUTPATIENT)
Dept: OCCUPATIONAL THERAPY | Age: 63
Setting detail: THERAPIES SERIES
Discharge: HOME | End: 2018-01-24
Payer: COMMERCIAL

## 2018-01-24 PROCEDURE — 97140 MANUAL THERAPY 1/> REGIONS: CPT

## 2018-01-24 PROCEDURE — 97110 THERAPEUTIC EXERCISES: CPT

## 2018-01-24 PROCEDURE — 97018 PARAFFIN BATH THERAPY: CPT

## 2018-01-26 ENCOUNTER — HOSPITAL ENCOUNTER (OUTPATIENT)
Dept: OCCUPATIONAL THERAPY | Age: 63
Setting detail: THERAPIES SERIES
Discharge: HOME | End: 2018-01-26
Payer: COMMERCIAL

## 2018-01-26 PROCEDURE — 97018 PARAFFIN BATH THERAPY: CPT

## 2018-01-26 PROCEDURE — 97140 MANUAL THERAPY 1/> REGIONS: CPT

## 2018-01-26 PROCEDURE — 97110 THERAPEUTIC EXERCISES: CPT

## 2018-01-31 ENCOUNTER — HOSPITAL ENCOUNTER (OUTPATIENT)
Dept: OCCUPATIONAL THERAPY | Age: 63
Setting detail: THERAPIES SERIES
Discharge: HOME | End: 2018-01-31
Payer: COMMERCIAL

## 2018-01-31 PROCEDURE — 97140 MANUAL THERAPY 1/> REGIONS: CPT

## 2018-01-31 PROCEDURE — 97110 THERAPEUTIC EXERCISES: CPT

## 2018-01-31 PROCEDURE — 97018 PARAFFIN BATH THERAPY: CPT

## 2018-01-31 NOTE — PROGRESS NOTES
Phone: [address]    Fax: [phone]                       Outpatient Occupational Therapy                                                                            Daily Note  Date: 2018   MRN: [id number]    ACCT#: [de-identified]  Patient: Rossana Baltazar  : 1955  Referring Practitioner: Dr. Chong Ryan    Diagnosis: R thumb [facility] arthrodesis/fusion         Total # of Visits Approved: 30 Per Physician Order  Total # of Visits to Date: 9  No Show: 0  Canceled Appointment: 0       Pre-Treament Pain:   Pain at present: 0  Subjective: Patient reports \"my left wrist hurts worse than my R hand. \"     Objective  LUE AROM (degrees)  L Wrist Flexion 0-80: 0-45  L Wrist Extension 0-70: 0-60  RUE AROM (degrees)  R Wrist Flexion 0-80: 0-60  R Wrist Extension 0-70: 0-60         Exercises/Modalities:  See DocFlow Sheet    Assessment  Assessment: Tolerated treatment session well. Noted improvements in R wrist AROM. Patient continues to show improvements overall. Plan to reassess all goals next week and determine further OT needs. Post Treatment Pain:   0/10    Goals  Short Term Goals  Short term goal 1: Patient to be independent with home program. - MET              Long Term Goals  Long term goal 1: Patient to increase R wrist flexion to 45 degrees or more in order to lift weights wighout difficulty. Long term goal 2: Patient to increase R wrist RD 0-20, UD 0-40, in order to don lotion without difficulty. Long term goal 3: Patient to increase R  strength to 50# or more in order to carry heavy objects with B/L hands. Long term goal 4: Patient to increase R lateral pinch strenght to 15#, tip to 10#, palmar to 10#, or more in order to open jars and packages independently.    Long term goal 5: Edema Reduction at wrist to 16.1 cm or less in order to allow for improved AROM of the wrist.           Time In: 1005  Time Out: 1045  Timed Coded Minutes: 30  Total Treatment Time: [address] [facility]   OTR/L Date: 1/31/2018

## 2018-02-02 ENCOUNTER — HOSPITAL ENCOUNTER (OUTPATIENT)
Dept: OCCUPATIONAL THERAPY | Age: 63
Setting detail: THERAPIES SERIES
Discharge: HOME | End: 2018-02-02
Payer: COMMERCIAL

## 2018-02-05 ENCOUNTER — HOSPITAL ENCOUNTER (OUTPATIENT)
Dept: OCCUPATIONAL THERAPY | Age: 63
Setting detail: THERAPIES SERIES
Discharge: HOME | End: 2018-02-05
Payer: COMMERCIAL

## 2018-02-05 PROCEDURE — 97140 MANUAL THERAPY 1/> REGIONS: CPT

## 2018-02-05 PROCEDURE — 97110 THERAPEUTIC EXERCISES: CPT

## 2018-02-05 PROCEDURE — 97018 PARAFFIN BATH THERAPY: CPT

## 2018-02-07 ENCOUNTER — HOSPITAL ENCOUNTER (OUTPATIENT)
Dept: OCCUPATIONAL THERAPY | Age: 63
Setting detail: THERAPIES SERIES
Discharge: HOME | End: 2018-02-07
Payer: COMMERCIAL

## 2018-02-07 PROCEDURE — 97530 THERAPEUTIC ACTIVITIES: CPT

## 2018-02-07 PROCEDURE — 97110 THERAPEUTIC EXERCISES: CPT

## 2018-02-07 NOTE — PROGRESS NOTES
Phone: [address]    Fax: [phone]                       Outpatient Occupational Therapy                                                                            Daily Note  Date: 2018   MRN: [id number]    ACCT#: [de-identified]  Patient: Azucena Keenan  : 1955  Referring Practitioner: Dr. Pola Adams    Diagnosis: R thumb Aia 16 arthrodesis/fusion         Total # of Visits Approved: 30 Per Physician Order  Total # of Visits to Date: 6  No Show: 0  Canceled Appointment: 0       Pre-Treament Pain:   Pain at present: 0  Subjective: Patient reports \"im just tired. \"    Objective  RUE AROM (degrees)  R Wrist Flexion 0-80: 0-50  R Wrist Extension 0-70: 0-65  R Wrist Radial Deviation 0-20: 0-20  R Wrist Ulnar Deviation 0-45: 0-30  Right Hand AROM (degrees)  R Thumb MCP 0-50: 0-54  R Thumb IP 0-80: 0-68           Right Hand Strength -  (lbs)  Handle Setting 2: 42#, 44#, 44# (43# average )  Right Hand Strength - Pinch (lbs)  Lateral: 11#  Tip: 9.5#  Palmar 3 point: 11#   RUE Edema - Circumference (cm)  Wrist Crease: 16.1 cm     Exercises/Modalities:  See DocFlow Sheet    Assessment  Assessment: Reassessed goals on this date. Noted improvements in ROM of the wrist and thumb. Also noted improved  and pinch strength. Patient states she would like to discontinue OT services after 18, and continue with HEP due to her mothers recent death. Therapist feels patient is progressing well and capable of continuing with treatment at home. Plan to D/C next treatment session. Post Treatment Pain:   0/10    Goals  Short Term Goals  Short term goal 1: Patient to be independent with home program. - MET              Long Term Goals  Long term goal 1: Patient to increase R wrist flexion to 45 degrees or more in order to lift weights wighout difficulty. - MET  Long term goal 2: Patient to increase R wrist RD 0-20, UD 0-40, in order to don lotion without difficulty.

## 2018-02-09 ENCOUNTER — HOSPITAL ENCOUNTER (OUTPATIENT)
Dept: OCCUPATIONAL THERAPY | Age: 63
Setting detail: THERAPIES SERIES
Discharge: HOME | End: 2018-02-09
Payer: COMMERCIAL

## 2018-02-09 PROCEDURE — 97110 THERAPEUTIC EXERCISES: CPT

## 2018-02-09 PROCEDURE — 97018 PARAFFIN BATH THERAPY: CPT

## 2018-02-09 PROCEDURE — 97140 MANUAL THERAPY 1/> REGIONS: CPT

## 2018-02-09 NOTE — PROGRESS NOTES
Phone: [address]    Fax: [phone]                       Outpatient Occupational Therapy                                                                            Daily Note  Date: 2018   MRN: [id number]    ACCT#: [de-identified]  Patient: Mariela Heart  : 1955  Referring Practitioner: Dr. López Kim    Diagnosis: R thumb [facility] arthrodesis/fusion         Total # of Visits Approved: 30 Per Physician Order  Total # of Visits to Date: 12  No Show: 0  Canceled Appointment: 0       Pre-Treament Pain:   Pain at present: 0  Subjective: Patient reports no pain          Exercises/Modalities:  See DocFlow Sheet    Assessment  Assessment: Patient seen for last treatment session on this date. Patient feels she has progressed well and is confident in her abililty to continue with home program.  D/C OT services at this time. Post Treatment Pain:   0/10    Goals  Short Term Goals  Short term goal 1: Patient to be independent with home program. - MET              Long Term Goals  Long term goal 1: Patient to increase R wrist flexion to 45 degrees or more in order to lift weights wighout difficulty. - MET  Long term goal 2: Patient to increase R wrist RD 0-20, UD 0-40, in order to don lotion without difficulty. Long term goal 3: Patient to increase R  strength to 50# or more in order to carry heavy objects with B/L hands. Long term goal 4: Patient to increase R lateral pinch strenght to 15#, tip to 10#, palmar to 10#, or more in order to open jars and packages independently.    Long term goal 5: Edema Reduction at wrist to 16.1 cm or less in order to allow for improved AROM of the wrist. - MET          Time In: 1000  Time Out: 1049  Timed Coded Minutes: 39  Total Treatment Time: [address]  Date: 2018

## 2018-02-09 NOTE — DISCHARGE SUMMARY
Phone: [address]    Fax: [phone]                       Outpatient Occupational Therapy                                                                    Discharge Summary    Patient: Linda Jackson  : 1955  ACCT #: [de-identified]   Referring Practitioner: Dr. Reece Seip     Diagnosis: R thumb [facility] arthrodesis/fusion     Date Treatment Initiated: 2018  Date of Last Treatment: 2018  Frequency:  2-3 times/wk  Duration:  4 weeks       Total # of Visits Approved: 30 Per Physician Order  Total # of Visits to Date: 15  No Show: 0  Canceled Appointment: 0     Current Treatment:  [x] HP/CP     [] Electrical Stimulation   [x]  Strengthening    [x]  Active/Passive ROM  [] Muscle Re-education    [x] Fine Motor Coordination    []  Ultrasound   []  Splinting  [] Developmental Therapy    [] Sensory Integration [x]  Patient Education/HEP [] Visual Perception Retraining   [] ADL Training   [] Cognitive Retraining  [] Fluidotherapy  [x] Paraffin   [x] Therapeutic Exercise  [x] Therapeutic Activity  [] Other:    Objective     RUE AROM (degrees)  R Wrist Flexion 0-80: 0-50  R Wrist Extension 0-70: 0-65  R Wrist Radial Deviation 0-20: 0-20  R Wrist Ulnar Deviation 0-45: 0-30    Right Hand AROM (degrees)  R Thumb MCP 0-50: 0-54  R Thumb IP 0-80: 0-68        Right Hand Strength -  (lbs)  Handle Setting 2: 42#, 44#, 44# (43# average )    Right Hand Strength - Pinch (lbs)  Lateral: 11#  Tip: 9.5#  Palmar 3 point: 11#     RUE Edema - Circumference (cm)  Wrist Crease: 16.1 cm          Assessment  Patient was referred to OT services s/p R thumb arthroplasty. Patient demonstrated great progress in AROM of the thumb/wrist and fingers as well as strength. Patient met all short term goals and 2/5 long term goals. Patient preferred to discontinue OT and complete a home program after 12 sessions as her recently passed away and she would be busy with handling estate.   Therapist felt

## 2018-03-09 DIAGNOSIS — Z12.11 COLON CANCER SCREENING: Primary | ICD-10-CM

## 2018-03-20 ENCOUNTER — OFFICE VISIT (OUTPATIENT)
Dept: FAMILY MEDICINE CLINIC | Age: 63
End: 2018-03-20
Payer: COMMERCIAL

## 2018-03-20 VITALS
WEIGHT: 220 LBS | HEART RATE: 74 BPM | HEIGHT: 67 IN | DIASTOLIC BLOOD PRESSURE: 80 MMHG | OXYGEN SATURATION: 95 % | SYSTOLIC BLOOD PRESSURE: 136 MMHG | BODY MASS INDEX: 34.53 KG/M2 | RESPIRATION RATE: 18 BRPM

## 2018-03-20 DIAGNOSIS — F41.9 ANXIETY: Primary | ICD-10-CM

## 2018-03-20 DIAGNOSIS — I10 ESSENTIAL HYPERTENSION: ICD-10-CM

## 2018-03-20 DIAGNOSIS — M15.9 GENERALIZED OSTEOARTHRITIS: ICD-10-CM

## 2018-03-20 DIAGNOSIS — M65.4 DE QUERVAIN'S TENOSYNOVITIS, LEFT: ICD-10-CM

## 2018-03-20 PROCEDURE — G8427 DOCREV CUR MEDS BY ELIG CLIN: HCPCS | Performed by: NURSE PRACTITIONER

## 2018-03-20 PROCEDURE — 99213 OFFICE O/P EST LOW 20 MIN: CPT | Performed by: NURSE PRACTITIONER

## 2018-03-20 PROCEDURE — 3014F SCREEN MAMMO DOC REV: CPT | Performed by: NURSE PRACTITIONER

## 2018-03-20 PROCEDURE — G8482 FLU IMMUNIZE ORDER/ADMIN: HCPCS | Performed by: NURSE PRACTITIONER

## 2018-03-20 PROCEDURE — G8417 CALC BMI ABV UP PARAM F/U: HCPCS | Performed by: NURSE PRACTITIONER

## 2018-03-20 PROCEDURE — 1036F TOBACCO NON-USER: CPT | Performed by: NURSE PRACTITIONER

## 2018-03-20 PROCEDURE — 3017F COLORECTAL CA SCREEN DOC REV: CPT | Performed by: NURSE PRACTITIONER

## 2018-03-20 RX ORDER — AMLODIPINE BESYLATE 10 MG/1
10 TABLET ORAL DAILY
Qty: 90 TABLET | Refills: 1 | Status: SHIPPED | OUTPATIENT
Start: 2018-03-20 | End: 2018-06-20 | Stop reason: SDUPTHER

## 2018-03-20 RX ORDER — TOLTERODINE TARTRATE 2 MG/1
2 TABLET, EXTENDED RELEASE ORAL 2 TIMES DAILY
Qty: 180 TABLET | Refills: 1 | Status: SHIPPED | OUTPATIENT
Start: 2018-03-20 | End: 2018-06-20 | Stop reason: SDUPTHER

## 2018-03-20 RX ORDER — IBUPROFEN 800 MG/1
800 TABLET ORAL DAILY PRN
Qty: 90 TABLET | Refills: 0 | Status: SHIPPED | OUTPATIENT
Start: 2018-03-20 | End: 2018-06-13 | Stop reason: SDUPTHER

## 2018-03-20 RX ORDER — ALPRAZOLAM 0.5 MG/1
TABLET ORAL
Qty: 90 TABLET | Refills: 0 | Status: SHIPPED | OUTPATIENT
Start: 2018-03-20 | End: 2018-06-20 | Stop reason: SDUPTHER

## 2018-03-20 ASSESSMENT — ENCOUNTER SYMPTOMS
CHEST TIGHTNESS: 0
ABDOMINAL DISTENTION: 0
WHEEZING: 0

## 2018-03-20 NOTE — PROGRESS NOTES
Will discuss Cervical cancer screen with Kylee Hemphill Said she had Shingles vaccine at [employer] in [facility] within last 5 years. Will call for date. Date of Zoster was 11-4-2015 documented in chart.

## 2018-04-04 ENCOUNTER — OFFICE VISIT (OUTPATIENT)
Dept: FAMILY MEDICINE CLINIC | Age: 63
End: 2018-04-04
Payer: COMMERCIAL

## 2018-04-04 VITALS
WEIGHT: 220 LBS | RESPIRATION RATE: 18 BRPM | HEART RATE: 75 BPM | BODY MASS INDEX: 34.53 KG/M2 | HEIGHT: 67 IN | TEMPERATURE: 97.9 F | SYSTOLIC BLOOD PRESSURE: 138 MMHG | OXYGEN SATURATION: 98 % | DIASTOLIC BLOOD PRESSURE: 80 MMHG

## 2018-04-04 DIAGNOSIS — J06.9 VIRAL URI: ICD-10-CM

## 2018-04-04 DIAGNOSIS — J20.9 BRONCHITIS, ACUTE, WITH BRONCHOSPASM: Primary | ICD-10-CM

## 2018-04-04 DIAGNOSIS — J98.01 ACUTE BRONCHOSPASM: ICD-10-CM

## 2018-04-04 LAB
INFLUENZA A ANTIBODY: NORMAL
INFLUENZA B ANTIBODY: NORMAL

## 2018-04-04 PROCEDURE — G8417 CALC BMI ABV UP PARAM F/U: HCPCS | Performed by: NURSE PRACTITIONER

## 2018-04-04 PROCEDURE — G8427 DOCREV CUR MEDS BY ELIG CLIN: HCPCS | Performed by: NURSE PRACTITIONER

## 2018-04-04 PROCEDURE — 3017F COLORECTAL CA SCREEN DOC REV: CPT | Performed by: NURSE PRACTITIONER

## 2018-04-04 PROCEDURE — 99213 OFFICE O/P EST LOW 20 MIN: CPT | Performed by: NURSE PRACTITIONER

## 2018-04-04 PROCEDURE — 1036F TOBACCO NON-USER: CPT | Performed by: NURSE PRACTITIONER

## 2018-04-04 PROCEDURE — 3014F SCREEN MAMMO DOC REV: CPT | Performed by: NURSE PRACTITIONER

## 2018-04-04 PROCEDURE — 87804 INFLUENZA ASSAY W/OPTIC: CPT | Performed by: NURSE PRACTITIONER

## 2018-04-04 RX ORDER — FLUCONAZOLE 150 MG/1
150 TABLET ORAL ONCE
Qty: 2 TABLET | Refills: 0 | Status: SHIPPED | OUTPATIENT
Start: 2018-04-04 | End: 2018-04-04

## 2018-04-04 RX ORDER — GUAIFENESIN AND CODEINE PHOSPHATE 100; 10 MG/5ML; MG/5ML
5 SOLUTION ORAL 2 TIMES DAILY PRN
Qty: 118 ML | Refills: 0 | Status: SHIPPED | OUTPATIENT
Start: 2018-04-04 | End: 2018-04-11

## 2018-04-04 RX ORDER — BENZONATATE 100 MG/1
100 CAPSULE ORAL 3 TIMES DAILY PRN
Qty: 21 CAPSULE | Refills: 0 | Status: SHIPPED | OUTPATIENT
Start: 2018-04-04 | End: 2018-04-11

## 2018-04-04 RX ORDER — CIPROFLOXACIN 500 MG/1
500 TABLET, FILM COATED ORAL 2 TIMES DAILY
Qty: 14 TABLET | Refills: 0 | Status: SHIPPED | OUTPATIENT
Start: 2018-04-04 | End: 2018-04-11

## 2018-04-04 ASSESSMENT — ENCOUNTER SYMPTOMS
EYES NEGATIVE: 1
SINUS PAIN: 1
SHORTNESS OF BREATH: 1
COUGH: 1
ABDOMINAL DISTENTION: 0

## 2018-05-16 ENCOUNTER — APPOINTMENT (OUTPATIENT)
Dept: GENERAL RADIOLOGY | Age: 63
End: 2018-05-16
Payer: COMMERCIAL

## 2018-05-16 ENCOUNTER — HOSPITAL ENCOUNTER (EMERGENCY)
Age: 63
Discharge: HOME | End: 2018-05-16
Attending: EMERGENCY MEDICINE
Payer: COMMERCIAL

## 2018-05-16 VITALS
TEMPERATURE: 98.4 F | HEART RATE: 84 BPM | SYSTOLIC BLOOD PRESSURE: 141 MMHG | HEIGHT: 67 IN | RESPIRATION RATE: 20 BRPM | DIASTOLIC BLOOD PRESSURE: 81 MMHG | WEIGHT: 200 LBS | OXYGEN SATURATION: 99 % | BODY MASS INDEX: 31.39 KG/M2

## 2018-05-16 DIAGNOSIS — S62.639B OPEN FRACTURE OF TUFT OF DISTAL PHALANX OF FINGER: Primary | ICD-10-CM

## 2018-05-16 PROCEDURE — 96372 THER/PROPH/DIAG INJ SC/IM: CPT

## 2018-05-16 PROCEDURE — 12042 INTMD RPR N-HF/GENIT2.6-7.5: CPT

## 2018-05-16 PROCEDURE — 73130 X-RAY EXAM OF HAND: CPT

## 2018-05-16 PROCEDURE — 2500000003 HC RX 250 WO HCPCS: Performed by: EMERGENCY MEDICINE

## 2018-05-16 PROCEDURE — 96365 THER/PROPH/DIAG IV INF INIT: CPT

## 2018-05-16 PROCEDURE — 99283 EMERGENCY DEPT VISIT LOW MDM: CPT

## 2018-05-16 PROCEDURE — 6360000002 HC RX W HCPCS: Performed by: EMERGENCY MEDICINE

## 2018-05-16 PROCEDURE — 96375 TX/PRO/DX INJ NEW DRUG ADDON: CPT

## 2018-05-16 RX ORDER — FLUCONAZOLE 150 MG/1
150 TABLET ORAL ONCE
Qty: 1 TABLET | Refills: 1 | Status: SHIPPED | OUTPATIENT
Start: 2018-05-16 | End: 2018-05-16

## 2018-05-16 RX ORDER — MORPHINE SULFATE 2 MG/ML
4 INJECTION, SOLUTION INTRAMUSCULAR; INTRAVENOUS ONCE
Status: DISCONTINUED | OUTPATIENT
Start: 2018-05-16 | End: 2018-05-16 | Stop reason: SDUPTHER

## 2018-05-16 RX ORDER — MORPHINE SULFATE 4 MG/ML
4 INJECTION, SOLUTION INTRAMUSCULAR; INTRAVENOUS ONCE
Status: COMPLETED | OUTPATIENT
Start: 2018-05-16 | End: 2018-05-16

## 2018-05-16 RX ORDER — LIDOCAINE HYDROCHLORIDE 10 MG/ML
20 INJECTION, SOLUTION INFILTRATION; PERINEURAL ONCE
Status: COMPLETED | OUTPATIENT
Start: 2018-05-16 | End: 2018-05-16

## 2018-05-16 RX ORDER — HYDROCODONE BITARTRATE AND ACETAMINOPHEN 5; 325 MG/1; MG/1
1 TABLET ORAL EVERY 6 HOURS PRN
Qty: 20 TABLET | Refills: 0 | Status: SHIPPED | OUTPATIENT
Start: 2018-05-16 | End: 2018-05-23

## 2018-05-16 RX ORDER — CEPHALEXIN 500 MG/1
500 CAPSULE ORAL 4 TIMES DAILY
Qty: 28 CAPSULE | Refills: 0 | Status: SHIPPED | OUTPATIENT
Start: 2018-05-16 | End: 2018-05-23

## 2018-05-16 RX ADMIN — MORPHINE SULFATE 4 MG: 4 INJECTION INTRAVENOUS at 13:55

## 2018-05-16 RX ADMIN — LIDOCAINE HYDROCHLORIDE 20 ML: 10 INJECTION, SOLUTION INFILTRATION; PERINEURAL at 12:17

## 2018-05-16 RX ADMIN — CEFAZOLIN SODIUM 2 G: 2 SOLUTION INTRAVENOUS at 12:49

## 2018-05-16 RX ADMIN — MORPHINE SULFATE 4 MG: 4 INJECTION INTRAVENOUS at 12:03

## 2018-05-16 ASSESSMENT — ENCOUNTER SYMPTOMS
WHEEZING: 0
RHINORRHEA: 0
SHORTNESS OF BREATH: 0
ABDOMINAL PAIN: 0
VOMITING: 0
BACK PAIN: 0
DIARRHEA: 0
EYE PAIN: 0
SORE THROAT: 0
CHEST TIGHTNESS: 0
TROUBLE SWALLOWING: 0

## 2018-05-16 ASSESSMENT — PAIN DESCRIPTION - LOCATION: LOCATION: HAND

## 2018-05-16 ASSESSMENT — PAIN DESCRIPTION - ORIENTATION: ORIENTATION: RIGHT

## 2018-05-16 ASSESSMENT — PAIN SCALES - GENERAL
PAINLEVEL_OUTOF10: 6
PAINLEVEL_OUTOF10: 10
PAINLEVEL_OUTOF10: 6
PAINLEVEL_OUTOF10: 6
PAINLEVEL_OUTOF10: 10
PAINLEVEL_OUTOF10: 8

## 2018-05-16 ASSESSMENT — PAIN DESCRIPTION - PAIN TYPE: TYPE: ACUTE PAIN

## 2018-06-14 NOTE — MR AVS SNAPSHOT
Patient is on Neuroradiology schedule 6/18/2018  for a US guided cervical lymph node biopsy.   Labs WNL for procedure.    Orders for NPO, scrubs  have been entered.  Consent will be done prior to procedure.     [place] [phone] for estimated time of procedure.     Discussed with Dr. Dallin Bolden IR RPA  [phone] Call pager  [phone] pager               After Visit Summary             [facility]   10/4/2017 11:30 AM   Office Visit    Description:  Female : 1955   Provider:  Debbe Leventhal, NP   Department:  [facility] [address] [place] [address] and Future Appointments         Below is a list of your follow-up and future appointments. This may not be a complete list as you may have made appointments directly with providers that we are not aware of or your providers may have made some for you. Please call your providers to confirm appointments. It is important to keep your appointments. Please bring your current insurance card, photo ID, co-pay, and all medication bottles to your appointment. If self-pay, payment is expected at the time of service. Your To-Do List     Future Appointments Provider Department Dept Phone    2018 11:00 AM Debbe Leventhal, NP [facility]    Please arrive 15 minutes prior to appointment, bring photo ID and insurance card. Future Orders Complete By Expires    CBC Auto Differential [TVD0013 Custom]  10/4/2017 10/4/2018    FADI DIGITAL SCREEN W OR WO CAD BILATERAL [ Custom]  10/4/2017 2018         Information from Your Visit        Department     Name Address Phone Fax    [facility] of [address] [id number] [phone]      You Were Seen for:         Comments    Screening for breast cancer   [[id number]]         Vital Signs     Blood Pressure Pulse Respirations Height Weight Last Menstrual Period    134/76 (Site: Left Arm, Position: Sitting, Cuff Size: Medium Adult) 65 18 5' 8\" (1.727 m) 213 lb (96.6 kg) 2012    Oxygen Saturation Body Mass Index Smoking Status             98% 32.39 kg/m2 Current Every Day Smoker         Additional Information about your Body Mass Index (BMI)           Your BMI as listed above is considered obese (30 or more).  BMI is an Yearly Flu Vaccine (1) 9/1/2017    HIV screening is recommended for all people regardless of risk factors  aged 15-65 years at least once (lifetime) who have never been HIV tested. 8/19/2020 (Originally 9/20/1968)    Mammograms are recommended every 2 years for low/average risk patients aged 48 - 69, and every year for high risk patients per updated national guidelines. However these guidelines can be individualized by your provider. 8/5/2018    Colonoscopy 12/16/2021    Cholesterol Screening 7/24/2022    Tetanus Combination Vaccine (2 - Td) 4/12/2027            MyChart Signup           [employer] allows you to send messages to your doctor, view your test results, renew your prescriptions, schedule appointments, view visit notes, and more. How Do I Sign Up? 1. In your Internet browser, go to [email or web address]  2. Click on the Sign Up Now link in the Sign In box. You will see the New Member Sign Up page. 3. Enter your [employer] Access Code exactly as it appears below. You will not need to use this code after youve completed the sign-up process. If you do not sign up before the expiration date, you must request a new code. [employer] Access Code: [email or web address]  Expires: 12/3/2017 12:14 PM    4. Enter your Social Security Number ([id number]) and Date of Birth (mm/dd/yyyy) as indicated and click Submit. You will be taken to the next sign-up page. 5. Create a [employer] ID. This will be your [employer] login ID and cannot be changed, so think of one that is secure and easy to remember. 6. Create a [employer] password. You can change your password at any time. 7. Enter your Password Reset Question and Answer. This can be used at a later time if you forget your password. 8. Enter your e-mail address. You will receive e-mail notification when new information is available in [address] 9. Click Sign Up. You can now view your medical record.      Additional Information

## 2018-06-20 ENCOUNTER — OFFICE VISIT (OUTPATIENT)
Dept: FAMILY MEDICINE CLINIC | Age: 63
End: 2018-06-20
Payer: COMMERCIAL

## 2018-06-20 VITALS
SYSTOLIC BLOOD PRESSURE: 120 MMHG | WEIGHT: 210 LBS | HEIGHT: 67 IN | OXYGEN SATURATION: 97 % | HEART RATE: 80 BPM | BODY MASS INDEX: 32.96 KG/M2 | DIASTOLIC BLOOD PRESSURE: 78 MMHG

## 2018-06-20 DIAGNOSIS — F41.9 ANXIETY: ICD-10-CM

## 2018-06-20 DIAGNOSIS — I10 ESSENTIAL HYPERTENSION: Primary | ICD-10-CM

## 2018-06-20 DIAGNOSIS — M19.90 ARTHRITIS: ICD-10-CM

## 2018-06-20 PROCEDURE — 3017F COLORECTAL CA SCREEN DOC REV: CPT | Performed by: NURSE PRACTITIONER

## 2018-06-20 PROCEDURE — 1036F TOBACCO NON-USER: CPT | Performed by: NURSE PRACTITIONER

## 2018-06-20 PROCEDURE — G8417 CALC BMI ABV UP PARAM F/U: HCPCS | Performed by: NURSE PRACTITIONER

## 2018-06-20 PROCEDURE — 99214 OFFICE O/P EST MOD 30 MIN: CPT | Performed by: NURSE PRACTITIONER

## 2018-06-20 PROCEDURE — G8427 DOCREV CUR MEDS BY ELIG CLIN: HCPCS | Performed by: NURSE PRACTITIONER

## 2018-06-20 RX ORDER — AMLODIPINE BESYLATE 10 MG/1
10 TABLET ORAL DAILY
Qty: 90 TABLET | Refills: 1 | Status: SHIPPED | OUTPATIENT
Start: 2018-06-20 | End: 2018-09-18 | Stop reason: SDUPTHER

## 2018-06-20 RX ORDER — TOLTERODINE TARTRATE 2 MG/1
2 TABLET, EXTENDED RELEASE ORAL 2 TIMES DAILY
Qty: 180 TABLET | Refills: 1 | Status: SHIPPED | OUTPATIENT
Start: 2018-06-20 | End: 2018-09-18 | Stop reason: ALTCHOICE

## 2018-06-20 RX ORDER — OMEPRAZOLE 20 MG/1
20 CAPSULE, DELAYED RELEASE ORAL DAILY
Qty: 90 CAPSULE | Refills: 0 | Status: SHIPPED | OUTPATIENT
Start: 2018-06-20 | End: 2018-09-18 | Stop reason: DRUGHIGH

## 2018-06-20 RX ORDER — ALPRAZOLAM 0.5 MG/1
TABLET ORAL
Qty: 90 TABLET | Refills: 0 | Status: SHIPPED | OUTPATIENT
Start: 2018-06-20 | End: 2018-09-18 | Stop reason: SDUPTHER

## 2018-06-20 RX ORDER — HYDROCODONE BITARTRATE AND ACETAMINOPHEN 5; 325 MG/1; MG/1
TABLET ORAL
Refills: 0 | COMMUNITY
Start: 2018-06-13 | End: 2018-06-20 | Stop reason: ALTCHOICE

## 2018-06-20 ASSESSMENT — PATIENT HEALTH QUESTIONNAIRE - PHQ9
2. FEELING DOWN, DEPRESSED OR HOPELESS: 0
SUM OF ALL RESPONSES TO PHQ9 QUESTIONS 1 & 2: 0
SUM OF ALL RESPONSES TO PHQ QUESTIONS 1-9: 0
1. LITTLE INTEREST OR PLEASURE IN DOING THINGS: 0

## 2018-06-20 ASSESSMENT — ENCOUNTER SYMPTOMS
BLURRED VISION: 0
SORE THROAT: 0
EYES NEGATIVE: 1
COUGH: 0
CHEST TIGHTNESS: 0
RHINORRHEA: 0
ABDOMINAL DISTENTION: 0

## 2018-09-18 ENCOUNTER — OFFICE VISIT (OUTPATIENT)
Dept: FAMILY MEDICINE CLINIC | Age: 63
End: 2018-09-18
Payer: MEDICARE

## 2018-09-18 VITALS
SYSTOLIC BLOOD PRESSURE: 136 MMHG | OXYGEN SATURATION: 98 % | HEIGHT: 67 IN | BODY MASS INDEX: 32.77 KG/M2 | HEART RATE: 75 BPM | DIASTOLIC BLOOD PRESSURE: 82 MMHG | WEIGHT: 208.8 LBS

## 2018-09-18 DIAGNOSIS — Z12.39 BREAST CANCER SCREENING: ICD-10-CM

## 2018-09-18 DIAGNOSIS — Z13.29 THYROID DISORDER SCREENING: ICD-10-CM

## 2018-09-18 DIAGNOSIS — F41.9 ANXIETY: Primary | ICD-10-CM

## 2018-09-18 DIAGNOSIS — Z13.220 SCREENING CHOLESTEROL LEVEL: ICD-10-CM

## 2018-09-18 DIAGNOSIS — M19.90 ARTHRITIS: ICD-10-CM

## 2018-09-18 DIAGNOSIS — I10 ESSENTIAL HYPERTENSION: ICD-10-CM

## 2018-09-18 DIAGNOSIS — E55.9 VITAMIN D DEFICIENCY: ICD-10-CM

## 2018-09-18 PROCEDURE — 99214 OFFICE O/P EST MOD 30 MIN: CPT | Performed by: NURSE PRACTITIONER

## 2018-09-18 PROCEDURE — G8417 CALC BMI ABV UP PARAM F/U: HCPCS | Performed by: NURSE PRACTITIONER

## 2018-09-18 PROCEDURE — 1036F TOBACCO NON-USER: CPT | Performed by: NURSE PRACTITIONER

## 2018-09-18 PROCEDURE — 3017F COLORECTAL CA SCREEN DOC REV: CPT | Performed by: NURSE PRACTITIONER

## 2018-09-18 PROCEDURE — G8427 DOCREV CUR MEDS BY ELIG CLIN: HCPCS | Performed by: NURSE PRACTITIONER

## 2018-09-18 RX ORDER — ALPRAZOLAM 0.5 MG/1
TABLET ORAL
Qty: 90 TABLET | Refills: 0 | Status: SHIPPED | OUTPATIENT
Start: 2018-09-18 | End: 2018-12-12 | Stop reason: SDUPTHER

## 2018-09-18 RX ORDER — AMLODIPINE BESYLATE 10 MG/1
10 TABLET ORAL DAILY
Qty: 90 TABLET | Refills: 1 | Status: SHIPPED | OUTPATIENT
Start: 2018-09-18 | End: 2019-01-11 | Stop reason: SDUPTHER

## 2018-09-18 RX ORDER — OMEPRAZOLE 20 MG/1
20 CAPSULE, DELAYED RELEASE ORAL DAILY PRN
Qty: 90 CAPSULE | Refills: 0 | Status: SHIPPED | OUTPATIENT
Start: 2018-09-18 | End: 2019-09-10 | Stop reason: SDUPTHER

## 2018-09-18 RX ORDER — OXYBUTYNIN CHLORIDE 5 MG/1
5 TABLET, EXTENDED RELEASE ORAL DAILY
Qty: 90 TABLET | Refills: 0 | Status: SHIPPED | OUTPATIENT
Start: 2018-09-18 | End: 2018-11-20 | Stop reason: SDUPTHER

## 2018-09-18 RX ORDER — LORATADINE 10 MG/1
10 CAPSULE, LIQUID FILLED ORAL DAILY PRN
Qty: 90 CAPSULE | Refills: 1 | Status: SHIPPED | OUTPATIENT
Start: 2018-09-18 | End: 2019-06-11 | Stop reason: ALTCHOICE

## 2018-09-18 ASSESSMENT — ENCOUNTER SYMPTOMS
CHEST TIGHTNESS: 0
COUGH: 0
EYES NEGATIVE: 1
ABDOMINAL DISTENTION: 0

## 2018-09-18 NOTE — PROGRESS NOTES
2018     Carrie Lay (:  1955) is a 70 y.o. female, here for evaluation of the following medical concerns:    HPI     Dermatology removal of skin lesion anterior chest dx with melanoma 1 week later for wide excision margins were clear. Rodrigo Infante- dermatologist.     Medicare new insurance  here for med refills. Xanax with daily use. For anxiety OARRS ran. Mother's memorial done last week. Doing well coping. htn well controlled with amlodipine. Lab Results   Component Value Date     2016    K 4.4 2016    CL 98 2016    CO2 27 2016    BUN 26 2016    CREATININE 0.85 2016    GLUCOSE 99 2016    CALCIUM 10.4 2016      Lab Results   Component Value Date    LDLCHOLESTEROL 113 2017       Urinary incontinence discussed medication covered will trial ditropan XL. Mixed stress and urge incontinence. No recent UTI. Wears pad. Hx left wrist injury in past with revision surgery pending next month. Review of Systems   Constitutional: Negative for activity change and appetite change. HENT: Negative for congestion. Eyes: Negative. Respiratory: Negative for cough and chest tightness. Gastrointestinal: Negative for abdominal distention. Genitourinary: Negative for difficulty urinating. Musculoskeletal: Negative for arthralgias. Psychiatric/Behavioral: Negative for agitation. Prior to Visit Medications    Medication Sig Taking?  Authorizing Provider   amLODIPine (NORVASC) 10 MG tablet Take 1 tablet by mouth daily Yes JESSENIA Gonzalez CNP   omeprazole (PRILOSEC) 20 MG delayed release capsule Take 1 capsule by mouth daily as needed In am empty stomach Yes JESSENIA Gonzalez CNP   oxybutynin (DITROPAN XL) 5 MG extended release tablet Take 1 tablet by mouth daily Yes JESSENIA Gonzalez CNP   loratadine (CLARITIN) 10 MG capsule Take 1 capsule by mouth daily as needed (allergic rhinitis) Yes JESSENIA Gonzalez 136/82   Site: Right Upper Arm Left Upper Arm    Position: Sitting Sitting    Cuff Size: Medium Adult Medium Adult    Pulse: 75     SpO2: 98%     Weight: 208 lb 12.8 oz (94.7 kg)     Height: 5' 7\" (1.702 m)       Estimated body mass index is 32.7 kg/m² as calculated from the following:    Height as of this encounter: 5' 7\" (1.702 m). Weight as of this encounter: 208 lb 12.8 oz (94.7 kg). Physical Exam   Constitutional: She is oriented to person, place, and time. She appears well-developed and well-nourished. No distress. HENT:   Head: Normocephalic and atraumatic. Right Ear: External ear normal.   Left Ear: External ear normal.   Mouth/Throat: Oropharynx is clear and moist.   Eyes: Pupils are equal, round, and reactive to light. EOM are normal. No scleral icterus. Neck: Normal range of motion. Neck supple. No thyromegaly present. Cardiovascular: Normal rate, regular rhythm, normal heart sounds and intact distal pulses. No murmur heard. No carotid bruit luz elena   Pulmonary/Chest: Effort normal and breath sounds normal. No respiratory distress. She has no wheezes. Abdominal: Soft. There is no tenderness. Musculoskeletal: Normal range of motion. She exhibits no tenderness. Lymphadenopathy:     She has no cervical adenopathy. Neurological: She is alert and oriented to person, place, and time. Skin: Skin is warm and dry. No rash noted. Psychiatric: She has a normal mood and affect. Her behavior is normal. Judgment and thought content normal.   Nursing note and vitals reviewed. ASSESSMENT/PLAN:  1. Anxiety    - ALPRAZolam (XANAX) 0.5 MG tablet; take 1 tablet by mouth daily if needed anxiety. Dispense: 90 tablet; Refill: 0    2. Thyroid disorder screening  Labs due with c/o hair falling out.   - TSH with Reflex; Future    3. Essential hypertension    - Basic Metabolic Panel; Future  - EKG 12 Lead; Future  - Hepatic Function Panel; Future    4. Arthritis    5.  Vitamin D deficiency  - Vitamin D 25 Hydroxy; Future    6. Screening cholesterol level  - Lipid Panel; Future    7. Breast cancer screening  Please schedule  - FADI SCREENING W CAD BILATERAL 2 VW; Future      Return in about 3 months (around 12/18/2018) for medicare visit-AWV, HTN check. An electronic signature was used to authenticate this note.     --JESSENIA Shabazz - CNP on 9/22/2018 at 10:52 AM

## 2018-09-28 ENCOUNTER — HOSPITAL ENCOUNTER (OUTPATIENT)
Age: 63
Discharge: HOME | End: 2018-09-28
Payer: MEDICARE

## 2018-09-28 DIAGNOSIS — Z13.220 SCREENING CHOLESTEROL LEVEL: ICD-10-CM

## 2018-09-28 DIAGNOSIS — E55.9 VITAMIN D DEFICIENCY: ICD-10-CM

## 2018-09-28 DIAGNOSIS — I10 ESSENTIAL HYPERTENSION: ICD-10-CM

## 2018-09-28 LAB
ALBUMIN SERPL-MCNC: 4.2 G/DL (ref 3.5–5.2)
ALBUMIN/GLOBULIN RATIO: ABNORMAL (ref 1–2.5)
ALP BLD-CCNC: 88 U/L (ref 35–104)
ALT SERPL-CCNC: 27 U/L (ref 5–33)
AST SERPL-CCNC: 31 U/L
BILIRUB SERPL-MCNC: 0.17 MG/DL (ref 0.3–1.2)
BILIRUBIN DIRECT: <0.08 MG/DL
BILIRUBIN, INDIRECT: ABNORMAL MG/DL (ref 0–1)
CHOLESTEROL/HDL RATIO: 2.9
CHOLESTEROL: 220 MG/DL
GLOBULIN: ABNORMAL G/DL (ref 1.5–3.8)
HDLC SERPL-MCNC: 75 MG/DL
LDL CHOLESTEROL: 128 MG/DL (ref 0–130)
PATIENT FASTING?: YES
TOTAL PROTEIN: 6.9 G/DL (ref 6.4–8.3)
TRIGL SERPL-MCNC: 87 MG/DL
VITAMIN D 25-HYDROXY: 38.9 NG/ML (ref 30–100)
VLDLC SERPL CALC-MCNC: ABNORMAL MG/DL (ref 1–30)

## 2018-09-28 PROCEDURE — 36415 COLL VENOUS BLD VENIPUNCTURE: CPT

## 2018-09-28 PROCEDURE — 82306 VITAMIN D 25 HYDROXY: CPT

## 2018-09-28 PROCEDURE — 80061 LIPID PANEL: CPT

## 2018-09-28 PROCEDURE — 80076 HEPATIC FUNCTION PANEL: CPT

## 2018-10-15 DIAGNOSIS — I10 ESSENTIAL HYPERTENSION: ICD-10-CM

## 2018-10-15 DIAGNOSIS — Z13.29 THYROID DISORDER SCREENING: ICD-10-CM

## 2018-10-15 LAB
BUN BLDV-MCNC: 26 MG/DL
CALCIUM SERPL-MCNC: 9.6 MG/DL
CHLORIDE BLD-SCNC: 101 MMOL/L
CO2: 27.3 MMOL/L
CREAT SERPL-MCNC: 1.11 MG/DL
GFR CALCULATED: 49
GLUCOSE BLD-MCNC: 93 MG/DL
POTASSIUM SERPL-SCNC: 4 MMOL/L
SODIUM BLD-SCNC: 137 MMOL/L
TSH SERPL DL<=0.05 MIU/L-ACNC: 1.76 UIU/ML

## 2018-10-17 ENCOUNTER — HOSPITAL ENCOUNTER (OUTPATIENT)
Dept: MAMMOGRAPHY | Age: 63
Discharge: HOME | End: 2018-10-19
Payer: MEDICARE

## 2018-10-17 DIAGNOSIS — Z12.39 BREAST CANCER SCREENING: ICD-10-CM

## 2018-10-17 PROCEDURE — 77067 SCR MAMMO BI INCL CAD: CPT

## 2018-12-12 ENCOUNTER — TELEPHONE (OUTPATIENT)
Dept: FAMILY MEDICINE CLINIC | Age: 63
End: 2018-12-12

## 2018-12-12 DIAGNOSIS — F41.9 ANXIETY: ICD-10-CM

## 2018-12-14 RX ORDER — ALPRAZOLAM 0.5 MG/1
TABLET ORAL
Qty: 90 TABLET | Refills: 0 | Status: SHIPPED | OUTPATIENT
Start: 2018-12-14 | End: 2019-03-01 | Stop reason: SDUPTHER

## 2019-01-11 RX ORDER — AMLODIPINE BESYLATE 10 MG/1
10 TABLET ORAL DAILY
Qty: 90 TABLET | Refills: 3 | Status: SHIPPED | OUTPATIENT
Start: 2019-01-11 | End: 2019-09-10 | Stop reason: SDUPTHER

## 2019-03-08 ENCOUNTER — OFFICE VISIT (OUTPATIENT)
Dept: FAMILY MEDICINE CLINIC | Age: 64
End: 2019-03-08
Payer: MEDICARE

## 2019-03-08 VITALS
BODY MASS INDEX: 30.61 KG/M2 | SYSTOLIC BLOOD PRESSURE: 152 MMHG | HEART RATE: 75 BPM | OXYGEN SATURATION: 97 % | DIASTOLIC BLOOD PRESSURE: 94 MMHG | WEIGHT: 195 LBS | HEIGHT: 67 IN | TEMPERATURE: 97.7 F

## 2019-03-08 DIAGNOSIS — F41.9 ANXIETY: ICD-10-CM

## 2019-03-08 DIAGNOSIS — I10 ESSENTIAL HYPERTENSION: Primary | ICD-10-CM

## 2019-03-08 PROCEDURE — 3017F COLORECTAL CA SCREEN DOC REV: CPT | Performed by: NURSE PRACTITIONER

## 2019-03-08 PROCEDURE — 99213 OFFICE O/P EST LOW 20 MIN: CPT | Performed by: NURSE PRACTITIONER

## 2019-03-08 PROCEDURE — 1036F TOBACCO NON-USER: CPT | Performed by: NURSE PRACTITIONER

## 2019-03-08 PROCEDURE — G8417 CALC BMI ABV UP PARAM F/U: HCPCS | Performed by: NURSE PRACTITIONER

## 2019-03-08 PROCEDURE — G8399 PT W/DXA RESULTS DOCUMENT: HCPCS | Performed by: NURSE PRACTITIONER

## 2019-03-08 PROCEDURE — 4040F PNEUMOC VAC/ADMIN/RCVD: CPT | Performed by: NURSE PRACTITIONER

## 2019-03-08 PROCEDURE — G8484 FLU IMMUNIZE NO ADMIN: HCPCS | Performed by: NURSE PRACTITIONER

## 2019-03-08 PROCEDURE — 1123F ACP DISCUSS/DSCN MKR DOCD: CPT | Performed by: NURSE PRACTITIONER

## 2019-03-08 PROCEDURE — G8427 DOCREV CUR MEDS BY ELIG CLIN: HCPCS | Performed by: NURSE PRACTITIONER

## 2019-03-08 PROCEDURE — 1090F PRES/ABSN URINE INCON ASSESS: CPT | Performed by: NURSE PRACTITIONER

## 2019-03-08 PROCEDURE — 1101F PT FALLS ASSESS-DOCD LE1/YR: CPT | Performed by: NURSE PRACTITIONER

## 2019-03-08 RX ORDER — LISINOPRIL 5 MG/1
5 TABLET ORAL DAILY
Qty: 30 TABLET | Refills: 1 | Status: SHIPPED | OUTPATIENT
Start: 2019-03-08 | End: 2019-05-07 | Stop reason: SDUPTHER

## 2019-03-08 ASSESSMENT — ENCOUNTER SYMPTOMS
COUGH: 0
EYES NEGATIVE: 1
SINUS PRESSURE: 0
ABDOMINAL DISTENTION: 0

## 2019-03-10 ENCOUNTER — TELEPHONE (OUTPATIENT)
Dept: FAMILY MEDICINE CLINIC | Age: 64
End: 2019-03-10

## 2019-03-13 ENCOUNTER — HOSPITAL ENCOUNTER (OUTPATIENT)
Dept: PHYSICAL THERAPY | Age: 64
Setting detail: THERAPIES SERIES
Discharge: HOME | End: 2019-03-13
Payer: MEDICARE

## 2019-03-13 PROCEDURE — 97161 PT EVAL LOW COMPLEX 20 MIN: CPT

## 2019-03-13 PROCEDURE — 97110 THERAPEUTIC EXERCISES: CPT

## 2019-03-13 ASSESSMENT — PAIN DESCRIPTION - LOCATION: LOCATION: KNEE

## 2019-03-13 ASSESSMENT — PAIN DESCRIPTION - ORIENTATION: ORIENTATION: LEFT

## 2019-03-13 ASSESSMENT — PAIN SCALES - GENERAL: PAINLEVEL_OUTOF10: 2

## 2019-03-15 ENCOUNTER — HOSPITAL ENCOUNTER (OUTPATIENT)
Dept: PHYSICAL THERAPY | Age: 64
Setting detail: THERAPIES SERIES
Discharge: HOME | End: 2019-03-15
Payer: MEDICARE

## 2019-03-15 PROCEDURE — 97110 THERAPEUTIC EXERCISES: CPT

## 2019-03-15 ASSESSMENT — PAIN DESCRIPTION - LOCATION: LOCATION: KNEE

## 2019-03-15 ASSESSMENT — PAIN DESCRIPTION - ORIENTATION: ORIENTATION: LEFT

## 2019-03-15 ASSESSMENT — PAIN SCALES - GENERAL: PAINLEVEL_OUTOF10: 2

## 2019-03-18 ENCOUNTER — HOSPITAL ENCOUNTER (OUTPATIENT)
Dept: PHYSICAL THERAPY | Age: 64
Setting detail: THERAPIES SERIES
Discharge: HOME | End: 2019-03-18
Payer: MEDICARE

## 2019-03-18 PROCEDURE — 97110 THERAPEUTIC EXERCISES: CPT

## 2019-03-18 ASSESSMENT — PAIN SCALES - GENERAL: PAINLEVEL_OUTOF10: 1

## 2019-03-20 ENCOUNTER — HOSPITAL ENCOUNTER (OUTPATIENT)
Dept: PHYSICAL THERAPY | Age: 64
Setting detail: THERAPIES SERIES
Discharge: HOME | End: 2019-03-20
Payer: MEDICARE

## 2019-03-20 PROCEDURE — 97110 THERAPEUTIC EXERCISES: CPT

## 2019-03-20 ASSESSMENT — PAIN SCALES - GENERAL: PAINLEVEL_OUTOF10: 2

## 2019-03-20 ASSESSMENT — PAIN DESCRIPTION - LOCATION: LOCATION: KNEE

## 2019-03-20 ASSESSMENT — PAIN DESCRIPTION - ORIENTATION: ORIENTATION: LEFT

## 2019-03-22 ENCOUNTER — HOSPITAL ENCOUNTER (OUTPATIENT)
Dept: PHYSICAL THERAPY | Age: 64
Setting detail: THERAPIES SERIES
Discharge: HOME | End: 2019-03-22
Payer: MEDICARE

## 2019-03-22 PROCEDURE — 97110 THERAPEUTIC EXERCISES: CPT

## 2019-03-22 ASSESSMENT — PAIN SCALES - GENERAL: PAINLEVEL_OUTOF10: 1

## 2019-03-25 ENCOUNTER — HOSPITAL ENCOUNTER (OUTPATIENT)
Dept: PHYSICAL THERAPY | Age: 64
Setting detail: THERAPIES SERIES
Discharge: HOME | End: 2019-03-25
Payer: MEDICARE

## 2019-03-25 PROCEDURE — 97110 THERAPEUTIC EXERCISES: CPT

## 2019-03-25 ASSESSMENT — PAIN SCALES - GENERAL: PAINLEVEL_OUTOF10: 1

## 2019-03-27 ENCOUNTER — HOSPITAL ENCOUNTER (OUTPATIENT)
Dept: PHYSICAL THERAPY | Age: 64
Setting detail: THERAPIES SERIES
Discharge: HOME | End: 2019-03-27
Payer: MEDICARE

## 2019-03-27 PROCEDURE — 97110 THERAPEUTIC EXERCISES: CPT

## 2019-03-27 ASSESSMENT — PAIN DESCRIPTION - LOCATION: LOCATION: KNEE

## 2019-03-27 ASSESSMENT — PAIN DESCRIPTION - ORIENTATION: ORIENTATION: LEFT

## 2019-03-27 ASSESSMENT — PAIN SCALES - GENERAL: PAINLEVEL_OUTOF10: 1

## 2019-03-29 ENCOUNTER — HOSPITAL ENCOUNTER (OUTPATIENT)
Dept: PHYSICAL THERAPY | Age: 64
Setting detail: THERAPIES SERIES
Discharge: HOME | End: 2019-03-29
Payer: MEDICARE

## 2019-03-29 PROCEDURE — 97110 THERAPEUTIC EXERCISES: CPT

## 2019-03-29 ASSESSMENT — PAIN DESCRIPTION - ORIENTATION: ORIENTATION: LEFT

## 2019-03-29 ASSESSMENT — PAIN SCALES - GENERAL: PAINLEVEL_OUTOF10: 3

## 2019-03-29 ASSESSMENT — PAIN DESCRIPTION - LOCATION: LOCATION: KNEE

## 2019-04-01 ENCOUNTER — HOSPITAL ENCOUNTER (OUTPATIENT)
Dept: PHYSICAL THERAPY | Age: 64
Setting detail: THERAPIES SERIES
Discharge: HOME | End: 2019-04-01
Payer: MEDICARE

## 2019-04-01 PROCEDURE — 97140 MANUAL THERAPY 1/> REGIONS: CPT

## 2019-04-01 PROCEDURE — 97110 THERAPEUTIC EXERCISES: CPT

## 2019-04-01 ASSESSMENT — PAIN DESCRIPTION - ORIENTATION: ORIENTATION: LEFT

## 2019-04-01 ASSESSMENT — PAIN SCALES - GENERAL: PAINLEVEL_OUTOF10: 2

## 2019-04-01 ASSESSMENT — PAIN DESCRIPTION - LOCATION: LOCATION: LEG

## 2019-04-01 NOTE — PROGRESS NOTES
Phone: [address]      Fax: [phone]                            Outpatient Physical Therapy                                                                            Daily Note    Date: 2019  Patient Name: Agusto Rodas        MRN: [id number]   ACCT#:  [de-identified]  : 1955  (70 y.o.)    Referring Practitioner: Dr Oswaldo Romero    Referral Date : 19    Diagnosis: S/P scope left knee medial and lateral meniscus   Treatment Diagnosis: Left knee pain    Onset Date: 19  PT Insurance Information: Medicare/ [employer]  Total # of Visits Approved: 12 Per Physician Order  Total # of Visits to Date: 9    Pre-Treatment Pain:  2/10     Assessment  Assessment: Knee pain 0/10, but left shin 2/10 and left heel 2/10 causing patient to walk on ball off foot and lack heel strike. Added mobs for shin and heel. Anticipate D/C in 2 sessions. Chart Reviewed: Yes    Plan  Plan: Continue with current plan    Exercises/Modalities/Manual:  See DocFlow Sheet    Education:           Goals  (Total # of Visits to Date: 5)   Short Term Goals - Time Frame for Short term goals: 8  Short term goal 1: Patient to be independent with HEP-met  Short term goal 2:  Increase strength left knee extension 4+/5 to alternate feet up and down stairs WFL             Long Term Goals - Time Frame for Long term goals : 12  Long term goal 1: Gait WFL without device-Met  Long term goal 2: Decrease pain left knee 0/10 x 3 days  Long term goal 3: Improve functional mobility with score of 50/80 or better on LEFS          Post Treatment Pain:  1/10    Time In: 11:17    Time Out : 12:02        Timed Code Treatment Minutes: 45 Minutes  Total Treatment Time: 39 Minutes    Miller Russell, PT     Date: 2019

## 2019-04-02 ENCOUNTER — HOSPITAL ENCOUNTER (OUTPATIENT)
Age: 64
Discharge: HOME | End: 2019-04-02
Payer: MEDICARE

## 2019-04-02 LAB
ANION GAP SERPL CALCULATED.3IONS-SCNC: 11 MMOL/L (ref 9–17)
BUN BLDV-MCNC: 22 MG/DL (ref 8–23)
BUN/CREAT BLD: 28 (ref 9–20)
CALCIUM SERPL-MCNC: 9.6 MG/DL (ref 8.6–10.4)
CHLORIDE BLD-SCNC: 108 MMOL/L (ref 98–107)
CO2: 23 MMOL/L (ref 20–31)
CREAT SERPL-MCNC: 0.79 MG/DL (ref 0.5–0.9)
GFR AFRICAN AMERICAN: >60 ML/MIN
GFR NON-AFRICAN AMERICAN: >60 ML/MIN
GFR SERPL CREATININE-BSD FRML MDRD: ABNORMAL ML/MIN/{1.73_M2}
GFR SERPL CREATININE-BSD FRML MDRD: ABNORMAL ML/MIN/{1.73_M2}
GLUCOSE BLD-MCNC: 111 MG/DL (ref 70–99)
POTASSIUM SERPL-SCNC: 3.8 MMOL/L (ref 3.7–5.3)
SODIUM BLD-SCNC: 142 MMOL/L (ref 135–144)

## 2019-04-02 PROCEDURE — 80048 BASIC METABOLIC PNL TOTAL CA: CPT

## 2019-04-02 PROCEDURE — 36415 COLL VENOUS BLD VENIPUNCTURE: CPT

## 2019-04-03 ENCOUNTER — HOSPITAL ENCOUNTER (OUTPATIENT)
Dept: PHYSICAL THERAPY | Age: 64
Setting detail: THERAPIES SERIES
Discharge: HOME | End: 2019-04-03
Payer: MEDICARE

## 2019-04-03 PROCEDURE — 97110 THERAPEUTIC EXERCISES: CPT

## 2019-04-03 PROCEDURE — 97140 MANUAL THERAPY 1/> REGIONS: CPT

## 2019-04-03 ASSESSMENT — PAIN SCALES - GENERAL: PAINLEVEL_OUTOF10: 1

## 2019-04-03 NOTE — PROGRESS NOTES
Phone: [address]      Fax: [phone]                            Outpatient Physical Therapy                                                                            Daily Note    Date: 4/3/2019  Patient Name: Radha Ayala        MRN: [id number]   ACCT#:  [de-identified]  : 1955  (70 y.o.)    Referring Practitioner: Dr Katarina Osuna    Referral Date : 19    Diagnosis: S/P scope left knee medial and lateral meniscus   Treatment Diagnosis: Left knee pain    Onset Date: 19  PT Insurance Information: Medicare/ [employer]  Total # of Visits Approved: 12 Per Physician Order  Total # of Visits to Date: 10  No Show: 0  Canceled Appointment: 0    Pre-Treatment Pain:  2/10     Assessment  Assessment: Pt reports Dr Shilpa Dodd her heel  and taped it. No mobs to this area  She notes shin soreness  from last vii.   Knee papin down to 1/10. She notes shot has helped heel a little so far. Issued LEFS for next visit, anticipate DC  Chart Reviewed: Yes    Plan  Plan: Continue with current plan    Exercises/Modalities/Manual:  See DocFlow Sheet      Goals  (Total # of Visits to Date: 10)   Short Term Goals - Time Frame for Short term goals: 8  Short term goal 1: Patient to be independent with HEP-met  Short term goal 2:  Increase strength left knee extension 4+/5 to alternate feet up and down stairs WFL-not met    Long Term Goals - Time Frame for Long term goals : 12  Long term goal 1: Gait WFL without device-Met  Long term goal 2: Decrease pain left knee 0/10 x 3 days  Long term goal 3: Improve functional mobility with score of 50/80 or better on LEFS          Post Treatment Pain:  210    Time In: 0900    Time Out : 0945        Timed Code Treatment Minutes: 45 Minutes  Total Treatment Time: 45 Minutes    Manny Grover    PTA  Date: 4/3/2019

## 2019-04-05 ENCOUNTER — HOSPITAL ENCOUNTER (OUTPATIENT)
Dept: PHYSICAL THERAPY | Age: 64
Setting detail: THERAPIES SERIES
Discharge: HOME | End: 2019-04-05
Payer: MEDICARE

## 2019-04-05 PROCEDURE — 97110 THERAPEUTIC EXERCISES: CPT

## 2019-04-05 PROCEDURE — 97140 MANUAL THERAPY 1/> REGIONS: CPT

## 2019-04-05 ASSESSMENT — PAIN SCALES - GENERAL: PAINLEVEL_OUTOF10: 2

## 2019-04-05 NOTE — PROGRESS NOTES
Phone: [address]      Fax: [phone]                            Outpatient Physical Therapy                                                                            Daily Note    Date: 2019  Patient Name: Dolores Buenrostro        MRN: [id number]   ACCT#:  [de-identified]  : 1955  (70 y.o.)    Referring Practitioner: Dr Mikki Mckeon    Referral Date : 19    Diagnosis: S/P scope left knee medial and lateral meniscus   Treatment Diagnosis: Left knee pain    Onset Date: 19  PT Insurance Information: Medicare/ [employer]  Total # of Visits Approved: 12 Per Physician Order  Total # of Visits to Date: 11  No Show: 0  Canceled Appointment: 0    Pre-Treatment Pain:  2/10     Assessment  Assessment: Patient rates pain today as 2/10. Scores 57/80 on LEFS. Continued with exercises as outlined above. Per PT plan, will discharge patient at this time. Chart Reviewed: Yes    Plan  Discharge    Exercises/Modalities/Manual:  See DocFlow Sheet    Education:           Goals  (Total # of Visits to Date: 6)   Short Term Goals - Time Frame for Short term goals: 8  Short term goal 1: Patient to be independent with HEP-met  Short term goal 2:  Increase strength left knee extension 4+/5 to alternate feet up and down stairs WFL-not met             Long Term Goals - Time Frame for Long term goals : 12  Long term goal 1: Gait WFL without device-Met  Long term goal 2: Decrease pain left knee 0/10 x 3 days-NOT MET  Long term goal 3: Improve functional mobility with score of 50/80 or better on LEFS - MET          Post Treatment Pain:  0-1/10    Time In: 0855   Time Out : 0938        Timed Code Treatment Minutes: 43 Minutes  Total Treatment Time: 37 Minutes    Fabiana Walton PTA    Date: 2019

## 2019-04-05 NOTE — DISCHARGE SUMMARY
Phone: [address]             Fax: [phone]                            Outpatient Physical Therapy                                                                    Discharge Summary    Patient: Eloise Cox  : 1955  ACCT #: [de-identified]   Referring Practitioner: Dr Pedro Valencia      Diagnosis: S/P scope left knee medial and lateral meniscus   Date Treatment Initiated: 3/1/19  Date of Last Treatment: 19    PT Visit Information  Onset Date: 19  PT Insurance Information: Medicare/ [employer]  Total # of Visits Approved: 12  Total # of Visits to Date: 11  Plan of Care/Certification Expiration Date: 04/10/19  No Show: 0  Canceled Appointment: 0    Frequency/Duration  Days: 3 times per week  Weeks: 4 weeks    Treatment Received  Therapeutic Exercise, Patient Education/HEP, Manual Therapy: Myofacial Release and Manual Therapy: Mobilization/Manipulation    Pain Level: 2    Assessment  Assessment: Patient rates pain today as 2/10. Full left knee ROM. Strength left knee extension 4+/5. Scores 57/80 on LEFS. Ambulation   Device: No Device  Quality of Gait: WFL    Reason for Discharge  ? Poor Follow Through X Completion of Prescribed Sessions    ? Optimal Function Achieved   ? Patient Discharged Self  ? Goals Achieved    Comments:   Thank you for this referral      Olivia Poster  Date: 2019

## 2019-04-05 NOTE — PRE-CERTIFICATION NOTE
Medicare Cap     [] Physical Therapy  [] Speech Therapy  [] Occupational therapy    *PT and Speech caps combine      $0043 Cap limit < kx modifier needed < $7163 requires pre-cert     Patient Name: Marlon Garcia  YOB: 1955     Date of [address] Name $$$ charge Daily Charge YTD   Total $   3/13/19 Chelal, ex 83.06, 30.16 113.22 113.22   3/15/19 Ex x 3 30.16 x 3 90.48 203.70   3/18/19 Ex x 3 30.16 x 3 90.48 294.18   3/20/19 Ex x3 30.16 x 3 90.48 384.66   3/22/19 Ex x 3 30.16 x 3 90.48 475.14   3/25/19 Ex x 3 30.16 x 3 90.48 565.62   3/27/29 Ex x3 30.16 x 3 90.48 656.10   3/29/19 Ex x3 30.16 x 3 90.48 746.58   4/1/19 Ex x 2, man 30.16 x 2, 27.43 87.75 834.33   4/3/19 Ex x 2, man x 1 30.16 x 2, 27.43 87.75 922.08   4/5/19 Ex x2, man x1 30.16 x 2, 27.43 87.75 1009.83

## 2019-05-07 DIAGNOSIS — I10 ESSENTIAL HYPERTENSION: Primary | ICD-10-CM

## 2019-05-07 RX ORDER — LISINOPRIL 5 MG/1
5 TABLET ORAL DAILY
Qty: 90 TABLET | Refills: 1 | Status: SHIPPED | OUTPATIENT
Start: 2019-05-07 | End: 2019-09-10 | Stop reason: SDUPTHER

## 2019-05-22 DIAGNOSIS — F41.9 ANXIETY: ICD-10-CM

## 2019-05-22 NOTE — TELEPHONE ENCOUNTER
Last OV: 3/8/2019  Last RX: 11/20/18, 3/1/19   Next scheduled apt: 6/11/2019          Health Maintenance   Topic Date Due    Diabetes screen  09/20/1993    Shingles Vaccine (2 of 2) 12/30/2015    Cervical cancer screen  06/16/2017    HIV screen  08/19/2020 (Originally 9/20/1968)    Flu vaccine (Season Ended) 09/01/2019    Potassium monitoring  04/02/2020    Creatinine monitoring  04/02/2020    Breast cancer screen  10/17/2020    Pneumococcal 65+ years Vaccine (2 of 2 - PPSV23) 12/07/2021    Colon cancer screen colonoscopy  12/16/2021    Lipid screen  09/28/2023    DTaP/Tdap/Td vaccine (2 - Td) 04/12/2027    DEXA (modify frequency per FRAX score)  Completed    Hepatitis C screen  Completed             (applicable per patient's age: Cancer Screenings, Depression Screening, Fall Risk Screening, Immunizations)    LDL Cholesterol (mg/dL)   Date Value   09/28/2018 128     AST (U/L)   Date Value   09/28/2018 31     ALT (U/L)   Date Value   09/28/2018 27     BUN (mg/dL)   Date Value   04/02/2019 22      (goal A1C is < 7)   (goal LDL is <100) need 30-50% reduction from baseline     BP Readings from Last 3 Encounters:   03/08/19 (!) 152/94   09/18/18 136/82   06/20/18 120/78    (goal /80)      All Future Testing planned in [facility]:  Lab Frequency Next Occurrence   EKG 12 Lead Once 72/33/3991   Basic Metabolic Panel Once 02/40/5138       Next Visit Date:  Future Appointments   Date Time Provider Melissa Gaitan   6/11/2019 11:30 AM JESSENIA Koo - CNP [facility]            Patient Active Problem List:     HTN (hypertension)     Arthritis     Chronic left shoulder pain     Anxiety     Bunion of great toe of right foot     History of adenomatous polyp of colon

## 2019-05-24 RX ORDER — ALPRAZOLAM 0.5 MG/1
0.5 TABLET ORAL DAILY PRN
Qty: 90 TABLET | Refills: 0 | Status: SHIPPED | OUTPATIENT
Start: 2019-05-24 | End: 2019-06-11 | Stop reason: SDUPTHER

## 2019-05-24 RX ORDER — OXYBUTYNIN CHLORIDE 5 MG/1
5 TABLET, EXTENDED RELEASE ORAL DAILY
Qty: 90 TABLET | Refills: 1 | Status: SHIPPED | OUTPATIENT
Start: 2019-05-24 | End: 2019-09-10 | Stop reason: SDUPTHER

## 2019-06-11 ENCOUNTER — OFFICE VISIT (OUTPATIENT)
Dept: FAMILY MEDICINE CLINIC | Age: 64
End: 2019-06-11
Payer: MEDICARE

## 2019-06-11 VITALS
BODY MASS INDEX: 30.54 KG/M2 | DIASTOLIC BLOOD PRESSURE: 88 MMHG | OXYGEN SATURATION: 96 % | WEIGHT: 195 LBS | TEMPERATURE: 97.9 F | HEART RATE: 74 BPM | SYSTOLIC BLOOD PRESSURE: 130 MMHG

## 2019-06-11 DIAGNOSIS — Z12.11 ENCOUNTER FOR SCREENING COLONOSCOPY: ICD-10-CM

## 2019-06-11 DIAGNOSIS — Z91.81 AT HIGH RISK FOR FALLS: ICD-10-CM

## 2019-06-11 DIAGNOSIS — F41.9 ANXIETY: Primary | ICD-10-CM

## 2019-06-11 DIAGNOSIS — Z86.010 HX OF COLONIC POLYPS: ICD-10-CM

## 2019-06-11 PROCEDURE — 99212 OFFICE O/P EST SF 10 MIN: CPT | Performed by: NURSE PRACTITIONER

## 2019-06-11 PROCEDURE — G8399 PT W/DXA RESULTS DOCUMENT: HCPCS | Performed by: NURSE PRACTITIONER

## 2019-06-11 PROCEDURE — 4040F PNEUMOC VAC/ADMIN/RCVD: CPT | Performed by: NURSE PRACTITIONER

## 2019-06-11 PROCEDURE — 1123F ACP DISCUSS/DSCN MKR DOCD: CPT | Performed by: NURSE PRACTITIONER

## 2019-06-11 PROCEDURE — 1090F PRES/ABSN URINE INCON ASSESS: CPT | Performed by: NURSE PRACTITIONER

## 2019-06-11 PROCEDURE — 3017F COLORECTAL CA SCREEN DOC REV: CPT | Performed by: NURSE PRACTITIONER

## 2019-06-11 PROCEDURE — G8417 CALC BMI ABV UP PARAM F/U: HCPCS | Performed by: NURSE PRACTITIONER

## 2019-06-11 PROCEDURE — 1036F TOBACCO NON-USER: CPT | Performed by: NURSE PRACTITIONER

## 2019-06-11 PROCEDURE — G8427 DOCREV CUR MEDS BY ELIG CLIN: HCPCS | Performed by: NURSE PRACTITIONER

## 2019-06-11 RX ORDER — ALPRAZOLAM 0.5 MG/1
0.5 TABLET ORAL DAILY PRN
Qty: 90 TABLET | Refills: 0 | Status: SHIPPED | OUTPATIENT
Start: 2019-06-11 | End: 2019-08-19 | Stop reason: SDUPTHER

## 2019-06-11 ASSESSMENT — ENCOUNTER SYMPTOMS
CONSTIPATION: 0
DIARRHEA: 0
SHORTNESS OF BREATH: 0

## 2019-06-11 ASSESSMENT — PATIENT HEALTH QUESTIONNAIRE - PHQ9
SUM OF ALL RESPONSES TO PHQ9 QUESTIONS 1 & 2: 0
2. FEELING DOWN, DEPRESSED OR HOPELESS: 0
SUM OF ALL RESPONSES TO PHQ QUESTIONS 1-9: 0
SUM OF ALL RESPONSES TO PHQ QUESTIONS 1-9: 0
1. LITTLE INTEREST OR PLEASURE IN DOING THINGS: 0

## 2019-06-11 NOTE — PROGRESS NOTES
2019     Francisco Alva (:  1955) is a 70 y.o. female, here for evaluation of the following medical concerns:    HPI    Anxiety  Xanax 0.5mg daily in AM. Well-controlled, no panic attacks.  doing better with afib; has lost weight and is doing well. Has been cooking with less salt and fat. Pt maintaining weight at 195lbs. Goes to gym in [place] 3x/week. Feels well   recently ill with atrial fib doing much better both are doing better and focusing on more heart healthy. Large amount of polyps on last scope in 2016, due for repeat, no laxative, no blood or mucous,   No family hx colon cancer. Maintains high fiber diet. Prefers Dr. Erik Bailey. Review of Systems   Constitutional: Negative for fatigue. Respiratory: Negative for shortness of breath. Cardiovascular: Negative for chest pain. Gastrointestinal: Negative for constipation and diarrhea. Genitourinary: Negative for difficulty urinating. Musculoskeletal: Positive for arthralgias (L knee; recent surgery but moves well). Neurological: Negative for dizziness and headaches. Psychiatric/Behavioral: Negative for agitation, confusion, decreased concentration and sleep disturbance. The patient is nervous/anxious. Prior to Visit Medications    Medication Sig Taking? Authorizing Provider   ALPRAZolam [place]) 0.5 MG tablet Take 1 tablet by mouth daily as needed for Anxiety for up to 90 days.  Yes JESSENIA Maurer CNP   oxybutynin (DITROPAN-XL) 5 MG extended release tablet Take 1 tablet by mouth daily Yes JESSENIA Maurer CNP   lisinopril (PRINIVIL;ZESTRIL) 5 MG tablet Take 1 tablet by mouth daily Yes JESSENIA Maurer CNP   amLODIPine (NORVASC) 10 MG tablet TAKE 1 TABLET BY MOUTH  DAILY Yes JESSENIA Maurer CNP   omeprazole (PRILOSEC) 20 MG delayed release capsule Take 1 capsule by mouth daily as needed In am empty stomach Yes JESSENIA Maurer CNP   NONFORMULARY Indications: Womens 1 a day 48 plus. Yes Historical Provider, MD   NONFORMULARY Indications: Natures way hair skin and nails  2500 mcg 1 a day. Yes Historical Provider, MD   vitamin B-12 (CYANOCOBALAMIN) 500 MCG tablet Take 500 mcg by mouth daily Yes Historical Provider, MD   Polypodium Leucotomos (HELIOCARE PO) Take 1 tablet by mouth daily Yes Historical Provider, MD   MIRVASO 0.33 % GEL  Yes Historical Provider, MD   Magnesium 400 MG CAPS Take  by mouth daily. Yes Historical Provider, MD   aspirin 81 MG tablet Take 81 mg by mouth daily. Yes Historical Provider, MD   hydroxychloroquine (PLAQUENIL) 200 MG tablet Take 200 mg by mouth daily. Yes Historical Provider, MD   fish oil-omega-3 fatty acids 1000 MG capsule Take 1 g by mouth 2 times daily. Yes Historical Provider, MD   vitamin D (CHOLECALCIFEROL) 400 UNITS TABS tablet Take  by mouth daily. 2000 mg once daily Yes Historical Provider, MD   calcium-vitamin D (OSCAL) 250-125 MG-UNIT per tablet Take 1 tablet by mouth daily. Vit D and Vit K 750 mg daily Yes Historical Provider, MD   Ascorbic Acid (VITAMIN C) 500 MG tablet Take 500 mg by mouth daily.    Yes Historical Provider, MD    MG tablet TAKE 1 TABLET DAILY AS NEEDED FOR PAIN  JESSENIA Mendiola CNP   betamethasone valerate (VALISONE) 0.1 % ointment Apply topically 2 times daily prn poison werner  JESSENIA Mendiola CNP        Social History     Tobacco Use    Smoking status: Former Smoker     Packs/day: 0.50     Years: 23.00     Pack years: 11.50     Types: Cigarettes     Start date: 1994     Last attempt to quit: 2017     Years since quittin.7    Smokeless tobacco: Never Used   Substance Use Topics    Alcohol use: Yes     Comment: occasionally        Vitals:    19 1120   BP: 130/88   Site: Right Upper Arm   Position: Sitting   Cuff Size: Large Adult   Pulse: 74   Temp: 97.9 °F (36.6 °C)   TempSrc: Oral   SpO2: 96%   Weight: 195 lb (88.5 kg)     Estimated body mass index is 30.54 kg/m² as calculated from the following:    Height as of 3/8/19: 5' 7\" (1.702 m). Weight as of this encounter: 195 lb (88.5 kg). Physical Exam   Constitutional: She is oriented to person, place, and time. She appears well-developed and well-nourished. HENT:   Head: Normocephalic. Right Ear: External ear normal.   Left Ear: External ear normal.   Mouth/Throat: Oropharynx is clear and moist.   Eyes: Pupils are equal, round, and reactive to light. Neck: Neck supple. No thyromegaly present. Cardiovascular: Normal rate, regular rhythm, normal heart sounds and intact distal pulses. Exam reveals no gallop and no friction rub. No murmur heard. Pulmonary/Chest: Effort normal and breath sounds normal.   Neurological: She is alert and oriented to person, place, and time. Skin: Skin is warm and dry. Psychiatric: She has a normal mood and affect. Her behavior is normal. Judgment and thought content normal.       ASSESSMENT/PLAN:  1. Anxiety  Pt using with good control  [facility] report done  No hx addiction    - ALPRAZolam (XANAX) 0.5 MG tablet; Take 1 tablet by mouth daily as needed for Anxiety for up to 90 days. Dispense: 90 tablet; Refill: 0    2. Hx of colonic polyps    - [address], [place], Internal Medicine, [place]    3. Encounter for screening colonoscopy  Refer for repeat  - [address], [place], [place], [place]    4. At high risk for falls        Return in about 3 months (around 9/11/2019) for med check. An electronic signature was used to authenticate this note.     --JESSENIA Ramirez - CNP on 6/11/2019 at 9:41 PM

## 2019-06-11 NOTE — PATIENT INSTRUCTIONS
SURVEY:    You may be receiving a survey from [employer] regarding your visit today. Please complete the survey to enable us to provide the highest quality of care to you and your family. If you cannot score us a very good on any question, please call the office to discuss how we could have made your experience a very good one. Thank you. Preventing falls is a special concern due to an increase in the likelihood of fracturing a bone. Factors affecting FALLS include: environmental factors, impaired vision or balance, chronic diseases that affect mental or physical functioning and certain medications (sedatives, antidepressants, benzodiazepines, narcotics and alcohol). Here are some tips to eliminate environmental factors that can lead to falls    Outdoors:  -use a cane or walker for added stability  -wear rubber-soled shoes for traction  -walk on grass when sidewalks are slippery  -In winter, carry salt or verito litter to sprinkle on slippery sidewalks.   -be careful on highly polished floors that become slick and dangerous when wet  -walk on even or level ground when able    Indoors:  -Keep rooms free of clutter, especially the floors  -Keep floor surfaces smooth but not slippery  -Wear supportive, low-heeled shoes even at home  -Avoid walking in socks, stockings or slippers.  -Be sure carpets and area rugs have skid-proof backing or are tacked to the floor  -Install grab bars on the bathroom walls near tub, shower and toilet. -Use a rubber bath mat in shower or tub  -Keep a flashlight with fresh batteries beside your bed.   -If using a step stool for hard-to-reach places, use a sturdy one with a handrails on both sides. OR  As a family member to help.   -Add ceiling fixtures to rooms lit by lamps. -Consider purchasing a cordless phone so that you do not have to rush to answer the phone when it rings, or so that you can call for help if you do fall.

## 2019-06-13 ENCOUNTER — OFFICE VISIT (OUTPATIENT)
Dept: FAMILY MEDICINE CLINIC | Age: 64
End: 2019-06-13
Payer: MEDICARE

## 2019-06-13 VITALS
DIASTOLIC BLOOD PRESSURE: 86 MMHG | HEART RATE: 62 BPM | BODY MASS INDEX: 30.61 KG/M2 | SYSTOLIC BLOOD PRESSURE: 139 MMHG | HEIGHT: 67 IN | WEIGHT: 195 LBS

## 2019-06-13 DIAGNOSIS — Z12.11 COLON CANCER SCREENING: Primary | ICD-10-CM

## 2019-06-13 DIAGNOSIS — Z86.010 HISTORY OF ADENOMATOUS POLYP OF COLON: ICD-10-CM

## 2019-06-13 DIAGNOSIS — Z01.818 PRE-OP TESTING: ICD-10-CM

## 2019-06-13 PROCEDURE — 99213 OFFICE O/P EST LOW 20 MIN: CPT | Performed by: INTERNAL MEDICINE

## 2019-06-13 PROCEDURE — G8399 PT W/DXA RESULTS DOCUMENT: HCPCS | Performed by: INTERNAL MEDICINE

## 2019-06-13 PROCEDURE — 3017F COLORECTAL CA SCREEN DOC REV: CPT | Performed by: INTERNAL MEDICINE

## 2019-06-13 PROCEDURE — G8427 DOCREV CUR MEDS BY ELIG CLIN: HCPCS | Performed by: INTERNAL MEDICINE

## 2019-06-13 PROCEDURE — G8417 CALC BMI ABV UP PARAM F/U: HCPCS | Performed by: INTERNAL MEDICINE

## 2019-06-13 PROCEDURE — 1090F PRES/ABSN URINE INCON ASSESS: CPT | Performed by: INTERNAL MEDICINE

## 2019-06-13 PROCEDURE — 1123F ACP DISCUSS/DSCN MKR DOCD: CPT | Performed by: INTERNAL MEDICINE

## 2019-06-13 PROCEDURE — 4040F PNEUMOC VAC/ADMIN/RCVD: CPT | Performed by: INTERNAL MEDICINE

## 2019-06-13 PROCEDURE — 1036F TOBACCO NON-USER: CPT | Performed by: INTERNAL MEDICINE

## 2019-06-13 RX ORDER — SODIUM, POTASSIUM,MAG SULFATES 17.5-3.13G
SOLUTION, RECONSTITUTED, ORAL ORAL
Qty: 1 BOTTLE | Refills: 0 | Status: CANCELLED | OUTPATIENT
Start: 2019-06-13

## 2019-06-13 RX ORDER — SODIUM, POTASSIUM,MAG SULFATES 17.5-3.13G
SOLUTION, RECONSTITUTED, ORAL ORAL
Qty: 1 BOTTLE | Refills: 0 | Status: ON HOLD
Start: 2019-06-13 | End: 2019-07-01 | Stop reason: HOSPADM

## 2019-06-13 ASSESSMENT — ENCOUNTER SYMPTOMS
COUGH: 0
SORE THROAT: 0
CHEST TIGHTNESS: 0
ABDOMINAL PAIN: 0
RHINORRHEA: 0
SHORTNESS OF BREATH: 0
BLOOD IN STOOL: 0
CONSTIPATION: 0
NAUSEA: 0
DIARRHEA: 0

## 2019-06-13 NOTE — PROGRESS NOTES
Subjective:      Patient ID: Luís Stevens is a 70 y.o. female. Skyla Kendrick a patient of Sharmin Mancia CNP,  presents to be evaluated for a colonoscopy. Skyla Kendrick is 70 y.o. and has had a colonoscopy in the past.  There is  a history of colon polyps (one adenomatous polyp in 2016 with multiple hyperplastic polyps). Currently Skyla Kendrick denies rectal bleeding, denies bowel habit changes, and denies abdominal pain. There is not a family history of colon cancer. Review of Systems   Constitutional: Negative. HENT: Negative for congestion, ear pain, rhinorrhea, sneezing and sore throat. Eyes: Negative for visual disturbance. Respiratory: Negative for cough, chest tightness and shortness of breath. Cardiovascular: Negative for chest pain and palpitations. Gastrointestinal: Negative for abdominal pain, blood in stool, constipation, diarrhea and nausea. Genitourinary: Negative for difficulty urinating, dysuria, frequency, menstrual problem and urgency. Musculoskeletal: Negative for arthralgias, joint swelling, myalgias and neck pain. Skin: Negative. Neurological: Negative for syncope. Psychiatric/Behavioral: Negative. Objective:   Physical Exam   Constitutional: She appears well-developed and well-nourished. HENT:   Head: Atraumatic. Eyes: Conjunctivae are normal.   Neck: Normal range of motion. Neck supple. Cardiovascular: Normal rate, regular rhythm and normal heart sounds. Pulmonary/Chest: Effort normal and breath sounds normal.   Abdominal: Soft. There is no tenderness. Musculoskeletal: Normal range of motion. Lymphadenopathy:     She has no cervical adenopathy. Neurological: She is alert. Skin: No rash noted. Psychiatric: She has a normal mood and affect. Her behavior is normal. Thought content normal.       Assessment:       Diagnosis Orders   1. Colon cancer screening  COLONOSCOPY (Screening)   2. Pre-op testing  EKG 12 Lead   3.  History of adenomatous polyp of colon COLONOSCOPY (Screening)           Plan:      1. Pre-op testing  ECG prior to the procedure. - EKG 12 Lead; Future    2. Colon cancer screening  Set up for colonoscopy. Risk and benefits explained and informed consent obtained. The bowel prep was explained to [facility] and she was instructed to start the prep the day before the procedure (printed directions were given). Avoid corn 1 week prior to the procedure as this can cause suctioning difficulties during the procedure. Avoid eating or drinking at least 8 hours prior to the procedure. [facility] was encouraged to call the clinic if she has any questions prior to the procedure. - COLONOSCOPY (Screening); Future    3. History of adenomatous polyp of colon    - COLONOSCOPY (Screening); Future    Follow up with Kelley Valdez CNP, after the procedure.           Marylou Shen MD

## 2019-06-13 NOTE — PATIENT INSTRUCTIONS
SURVEY:    You may be receiving a survey from [employer] regarding your visit today. Please complete the survey to enable us to provide the highest quality of care to you and your family. If you cannot score us a very good on any question, please call the office to discuss how we could of made your experience a very good one. Thank you. 1. Pre-op testing  ECG prior to the procedure. - EKG 12 Lead; Future    2. Colon cancer screening  Set up for colonoscopy. Risk and benefits explained and informed consent obtained. The bowel prep was explained to [facility] and she was instructed to start the prep the day before the procedure (printed directions were given). Avoid corn 1 week prior to the procedure as this can cause suctioning difficulties during the procedure. Avoid eating or drinking at least 8 hours prior to the procedure. [facility] was encouraged to call the clinic if she has any questions prior to the procedure. - COLONOSCOPY (Screening); Future    3. History of adenomatous polyp of colon    - COLONOSCOPY (Screening); Future    Follow up with Padmini Shaffer CNP, after the procedure. Patient Education        Learning About Colonoscopy  What is a colonoscopy? A colonoscopy is a test (also called a procedure) that lets a doctor look inside your large intestine. The doctor uses a thin, lighted tube called a colonoscope. The doctor uses it to look for small growths called polyps, colon or rectal cancer (colorectal cancer), or other problems like bleeding. During the procedure, the doctor can take samples of tissue. The samples can then be checked for cancer or other conditions. The doctor can also take out polyps. How is colonoscopy done? This procedure is done in a doctor's office or a clinic or hospital. You will get medicine to help you relax and not feel pain. Some people find that they do not remember having the test because of the medicine.   The doctor gently moves the colonoscope, or scope, through the colon. The scope is also a small video camera. It lets the doctor see the colon and take pictures. A colonoscopy usually takes 30 to 45 minutes. It may take longer if the doctor has to remove polyps. How do you prepare for the procedure? You need to clean out your colon before the procedure so the doctor can see all of your colon. You may start the cleaning process a day or two before the test. This depends on which \"colon prep\" your doctor recommends. To clean your colon, you stop eating solid foods and drink only clear liquids. You can have water, tea, coffee, clear juices, clear broths, flavored ice pops, and gelatin (such as Jell-O). Do not drink anything red or purple, such as grape juice or fruit punch. And do not eat red or purple foods, such as grape ice pops or cherry gelatin. The day or night before the procedure, you drink a large amount of a special liquid. This causes loose, frequent stools. You will go to the bathroom a lot. It is very important to drink all of the colon prep liquid. If you have problems drinking the liquid, call your doctor. For many people, the prep is worse than the test. It may be uncomfortable, and you may feel hungry on the clear liquid diet. Some people do not go to work or do their usual activities on the day of the prep. Arrange to have someone take you home after the test.  What can you expect after a colonoscopy? The nurses will watch you for 1 to 2 hours until the medicines wear off. Then you can go home. You will need a ride. Your doctor will tell you when you can eat and do your usual activities. Your doctor will talk to you about when you will need your next colonoscopy. The results of your test and your risk for colorectal cancer will help your doctor decide how often you need to be checked. Follow-up care is a key part of your treatment and safety. Be sure to make and go to all appointments, and call your doctor if you are having problems. It's also a good idea to know your test results and keep a list of the medicines you take. Where can you learn more? Go to [email or web address] and sign in to your [facility] account. Enter Z519 in the TweetMeme box to learn more about \"Learning About Colonoscopy. \"     If you do not have an account, please click on the \"Sign Up Now\" link. Current as of: December 19, 2018  Content Version: 12.0  © 2769-0688 Healthwise, Incorporated. Care instructions adapted under license by [facility] ([facility]). If you have questions about a medical condition or this instruction, always ask your healthcare professional. [address] any warranty or liability for your use of this information.

## 2019-06-13 NOTE — PROGRESS NOTES
Visit Information    Have you changed or started any medications since your last visit including any over-the-counter medicines, vitamins, or herbal medicines? no   Are you having any side effects from any of your medications? -  no  Have you stopped taking any of your medications? Is so, why? -  no    Have you seen any other physician or provider since your last visit? Yes - Records Requested  Have you had any other diagnostic tests since your last visit? No  Have you been seen in the emergency room and/or had an admission to a hospital since we last saw you? No  Have you had your routine dental cleaning in the past 6 months? yes -     Have you activated your [employer] account?  If not, what are your barriers? no    Patient Care Team:  JESSENIA Royal CNP as PCP - General (Certified Nurse Practitioner)  JESSENIA Royal CNP as PCP - [facility] Provider    Medical History Review  Past Medical, Family, and Social History reviewed and does contribute to the patient presenting condition    Health Maintenance   Topic Date Due    Diabetes screen  09/20/1993    Cervical cancer screen  06/16/2017    HIV screen  08/19/2020 (Originally 9/20/1968)    Potassium monitoring  04/02/2020    Creatinine monitoring  04/02/2020    Breast cancer screen  10/17/2020    Pneumococcal 65+ years Vaccine (2 of 2 - PPSV23) 12/07/2021    Colon cancer screen colonoscopy  12/16/2021    Lipid screen  09/28/2023    DTaP/Tdap/Td vaccine (3 - Td) 12/10/2028    DEXA (modify frequency per FRAX score)  Completed    Flu vaccine  Completed    Shingles Vaccine  Completed    Hepatitis C screen  Completed

## 2019-06-19 ENCOUNTER — HOSPITAL ENCOUNTER (OUTPATIENT)
Age: 64
Discharge: HOME | End: 2019-06-19
Payer: MEDICARE

## 2019-06-19 DIAGNOSIS — Z01.818 PRE-OP TESTING: ICD-10-CM

## 2019-06-19 LAB
EKG ATRIAL RATE: 65 BPM
EKG P AXIS: 31 DEGREES
EKG P-R INTERVAL: 166 MS
EKG Q-T INTERVAL: 396 MS
EKG QRS DURATION: 82 MS
EKG QTC CALCULATION (BAZETT): 411 MS
EKG R AXIS: 17 DEGREES
EKG T AXIS: 57 DEGREES
EKG VENTRICULAR RATE: 65 BPM

## 2019-06-19 PROCEDURE — 93010 ELECTROCARDIOGRAM REPORT: CPT | Performed by: INTERNAL MEDICINE

## 2019-06-19 PROCEDURE — 93005 ELECTROCARDIOGRAM TRACING: CPT

## 2019-06-25 ENCOUNTER — ANESTHESIA EVENT (OUTPATIENT)
Dept: ENDOSCOPY | Age: 64
End: 2019-06-25
Payer: MEDICARE

## 2019-06-30 ENCOUNTER — PREP FOR PROCEDURE (OUTPATIENT)
Dept: FAMILY MEDICINE CLINIC | Age: 64
End: 2019-06-30

## 2019-06-30 RX ORDER — SODIUM CHLORIDE, SODIUM LACTATE, POTASSIUM CHLORIDE, CALCIUM CHLORIDE 600; 310; 30; 20 MG/100ML; MG/100ML; MG/100ML; MG/100ML
INJECTION, SOLUTION INTRAVENOUS CONTINUOUS
Status: CANCELLED | OUTPATIENT
Start: 2019-06-30

## 2019-06-30 RX ORDER — SODIUM CHLORIDE 0.9 % (FLUSH) 0.9 %
10 SYRINGE (ML) INJECTION EVERY 12 HOURS SCHEDULED
Status: CANCELLED | OUTPATIENT
Start: 2019-06-30

## 2019-06-30 RX ORDER — 0.9 % SODIUM CHLORIDE 0.9 %
10 VIAL (ML) INJECTION PRN
Status: CANCELLED | OUTPATIENT
Start: 2019-06-30

## 2019-07-01 ENCOUNTER — ANESTHESIA (OUTPATIENT)
Dept: ENDOSCOPY | Age: 64
End: 2019-07-01
Payer: MEDICARE

## 2019-07-01 ENCOUNTER — HOSPITAL ENCOUNTER (OUTPATIENT)
Age: 64
Setting detail: OUTPATIENT SURGERY
Discharge: HOME | End: 2019-07-01
Attending: INTERNAL MEDICINE | Admitting: INTERNAL MEDICINE
Payer: MEDICARE

## 2019-07-01 VITALS
DIASTOLIC BLOOD PRESSURE: 75 MMHG | OXYGEN SATURATION: 99 % | BODY MASS INDEX: 29.98 KG/M2 | SYSTOLIC BLOOD PRESSURE: 132 MMHG | WEIGHT: 191 LBS | HEIGHT: 67 IN | HEART RATE: 62 BPM | RESPIRATION RATE: 18 BRPM | TEMPERATURE: 96.9 F

## 2019-07-01 VITALS — OXYGEN SATURATION: 100 % | TEMPERATURE: 96.8 F | SYSTOLIC BLOOD PRESSURE: 107 MMHG | DIASTOLIC BLOOD PRESSURE: 47 MMHG

## 2019-07-01 PROCEDURE — 3700000000 HC ANESTHESIA ATTENDED CARE: Performed by: INTERNAL MEDICINE

## 2019-07-01 PROCEDURE — 6360000002 HC RX W HCPCS: Performed by: NURSE ANESTHETIST, CERTIFIED REGISTERED

## 2019-07-01 PROCEDURE — 6370000000 HC RX 637 (ALT 250 FOR IP): Performed by: INTERNAL MEDICINE

## 2019-07-01 PROCEDURE — 7100000011 HC PHASE II RECOVERY - ADDTL 15 MIN: Performed by: INTERNAL MEDICINE

## 2019-07-01 PROCEDURE — 2580000003 HC RX 258: Performed by: INTERNAL MEDICINE

## 2019-07-01 PROCEDURE — 2500000003 HC RX 250 WO HCPCS: Performed by: NURSE ANESTHETIST, CERTIFIED REGISTERED

## 2019-07-01 PROCEDURE — 2709999900 HC NON-CHARGEABLE SUPPLY: Performed by: INTERNAL MEDICINE

## 2019-07-01 PROCEDURE — 7100000010 HC PHASE II RECOVERY - FIRST 15 MIN: Performed by: INTERNAL MEDICINE

## 2019-07-01 PROCEDURE — 3609010600 HC COLONOSCOPY POLYPECTOMY SNARE/COLD BIOPSY: Performed by: INTERNAL MEDICINE

## 2019-07-01 PROCEDURE — 3700000001 HC ADD 15 MINUTES (ANESTHESIA): Performed by: INTERNAL MEDICINE

## 2019-07-01 PROCEDURE — 88305 TISSUE EXAM BY PATHOLOGIST: CPT

## 2019-07-01 PROCEDURE — 45380 COLONOSCOPY AND BIOPSY: CPT | Performed by: INTERNAL MEDICINE

## 2019-07-01 RX ORDER — 0.9 % SODIUM CHLORIDE 0.9 %
10 VIAL (ML) INJECTION PRN
Status: DISCONTINUED | OUTPATIENT
Start: 2019-07-01 | End: 2019-07-01 | Stop reason: HOSPADM

## 2019-07-01 RX ORDER — SODIUM CHLORIDE, SODIUM LACTATE, POTASSIUM CHLORIDE, CALCIUM CHLORIDE 600; 310; 30; 20 MG/100ML; MG/100ML; MG/100ML; MG/100ML
INJECTION, SOLUTION INTRAVENOUS CONTINUOUS
Status: DISCONTINUED | OUTPATIENT
Start: 2019-07-01 | End: 2019-07-01 | Stop reason: HOSPADM

## 2019-07-01 RX ORDER — PROPOFOL 10 MG/ML
INJECTION, EMULSION INTRAVENOUS CONTINUOUS PRN
Status: DISCONTINUED | OUTPATIENT
Start: 2019-07-01 | End: 2019-07-01 | Stop reason: SDUPTHER

## 2019-07-01 RX ORDER — LIDOCAINE HYDROCHLORIDE 20 MG/ML
JELLY TOPICAL PRN
Status: DISCONTINUED | OUTPATIENT
Start: 2019-07-01 | End: 2019-07-01 | Stop reason: ALTCHOICE

## 2019-07-01 RX ORDER — SODIUM CHLORIDE 0.9 % (FLUSH) 0.9 %
10 SYRINGE (ML) INJECTION EVERY 12 HOURS SCHEDULED
Status: DISCONTINUED | OUTPATIENT
Start: 2019-07-01 | End: 2019-07-01 | Stop reason: HOSPADM

## 2019-07-01 RX ORDER — FENTANYL CITRATE 50 UG/ML
INJECTION, SOLUTION INTRAMUSCULAR; INTRAVENOUS PRN
Status: DISCONTINUED | OUTPATIENT
Start: 2019-07-01 | End: 2019-07-01 | Stop reason: SDUPTHER

## 2019-07-01 RX ORDER — GLYCOPYRROLATE 1 MG/5 ML
SYRINGE (ML) INTRAVENOUS PRN
Status: DISCONTINUED | OUTPATIENT
Start: 2019-07-01 | End: 2019-07-01 | Stop reason: SDUPTHER

## 2019-07-01 RX ORDER — LIDOCAINE HYDROCHLORIDE 10 MG/ML
INJECTION, SOLUTION EPIDURAL; INFILTRATION; INTRACAUDAL; PERINEURAL PRN
Status: DISCONTINUED | OUTPATIENT
Start: 2019-07-01 | End: 2019-07-01 | Stop reason: SDUPTHER

## 2019-07-01 RX ORDER — MIDAZOLAM HYDROCHLORIDE 1 MG/ML
INJECTION INTRAMUSCULAR; INTRAVENOUS PRN
Status: DISCONTINUED | OUTPATIENT
Start: 2019-07-01 | End: 2019-07-01 | Stop reason: SDUPTHER

## 2019-07-01 RX ADMIN — PROPOFOL 75 MCG/KG/MIN: 10 INJECTION, EMULSION INTRAVENOUS at 07:57

## 2019-07-01 RX ADMIN — FENTANYL CITRATE 50 MCG: 50 INJECTION, SOLUTION INTRAMUSCULAR; INTRAVENOUS at 08:05

## 2019-07-01 RX ADMIN — SODIUM CHLORIDE, POTASSIUM CHLORIDE, SODIUM LACTATE AND CALCIUM CHLORIDE: 600; 310; 30; 20 INJECTION, SOLUTION INTRAVENOUS at 07:15

## 2019-07-01 RX ADMIN — Medication 0.2 MG: at 07:47

## 2019-07-01 RX ADMIN — MIDAZOLAM HYDROCHLORIDE 2 MG: 2 INJECTION, SOLUTION INTRAMUSCULAR; INTRAVENOUS at 07:47

## 2019-07-01 RX ADMIN — FENTANYL CITRATE 50 MCG: 50 INJECTION, SOLUTION INTRAMUSCULAR; INTRAVENOUS at 07:56

## 2019-07-01 RX ADMIN — LIDOCAINE HYDROCHLORIDE 3 ML: 10 INJECTION, SOLUTION EPIDURAL; INFILTRATION; INTRACAUDAL; PERINEURAL at 07:53

## 2019-07-01 ASSESSMENT — PAIN SCALES - GENERAL
PAINLEVEL_OUTOF10: 0
PAINLEVEL_OUTOF10: 0

## 2019-07-01 ASSESSMENT — PAIN - FUNCTIONAL ASSESSMENT: PAIN_FUNCTIONAL_ASSESSMENT: 0-10

## 2019-07-01 NOTE — ANESTHESIA PRE PROCEDURE
Department of Anesthesiology  Preprocedure Note       Name:  Elaine Major   Age:  70 y.o.  :  1955                                          MRN:  [id number]         Date:  2019      Surgeon: Diallo Conteh):  Eva Quesada MD    Procedure: COLONOSCOPY (N/A Abdomen)    Medications prior to admission:   Prior to Admission medications    Medication Sig Start Date End Date Taking? Authorizing Provider   Na Sulfate-K Sulfate-Mg Sulf (SUPREP BOWEL PREP KIT) 17.5-3.13-1.6 GM/177ML SOLN Use as directed. 19  Yes Keanu Quesada MD   ALPRAZolam (XANAX) 0.5 MG tablet Take 1 tablet by mouth daily as needed for Anxiety for up to 90 days. 19 Yes JESSENIA Neri CNP   oxybutynin (DITROPAN-XL) 5 MG extended release tablet Take 1 tablet by mouth daily 19  Yes JESSENIA Neri CNP   lisinopril (PRINIVIL;ZESTRIL) 5 MG tablet Take 1 tablet by mouth daily 19  Yes JESSENIA Neri CNP   amLODIPine (NORVASC) 10 MG tablet TAKE 1 TABLET BY MOUTH  DAILY 19  Yes JESSENIA Neri CNP   NONFORMULARY Indications: Womens 1 a day 50 plus. Yes Historical Provider, MD   NONFORMULARY Indications: Natures way hair skin and nails  2500 mcg 1 a day. Yes Historical Provider, MD   vitamin B-12 (CYANOCOBALAMIN) 500 MCG tablet Take 500 mcg by mouth daily   Yes Historical Provider, MD   Polypodium Leucotomos (HELIOCARE PO) Take 1 tablet by mouth daily   Yes Historical Provider, MD   MIRVASO 0.33 % GEL  16  Yes Historical Provider, MD   Magnesium 400 MG CAPS Take  by mouth daily. Yes Historical Provider, MD   hydroxychloroquine (PLAQUENIL) 200 MG tablet Take 200 mg by mouth daily. Yes Historical Provider, MD   fish oil-omega-3 fatty acids 1000 MG capsule Take 1 g by mouth 2 times daily. Yes Historical Provider, MD   vitamin D (CHOLECALCIFEROL) 400 UNITS TABS tablet Take  by mouth daily.  2000 mg once daily   Yes Historical Provider, MD   calcium-vitamin D (OSCAL) 250-125 MG-UNIT per tablet Take 1 tablet by mouth daily. Vit D and Vit K 750 mg daily   Yes Historical Provider, MD   Ascorbic Acid (VITAMIN C) 500 MG tablet Take 500 mg by mouth daily. Yes Historical Provider, MD   omeprazole (PRILOSEC) 20 MG delayed release capsule Take 1 capsule by mouth daily as needed In am empty stomach 9/18/18   JESSENIA Betancourt CNP    MG tablet TAKE 1 TABLET DAILY AS NEEDED FOR PAIN 6/13/18   JESSENIA Betancourt CNP   betamethasone valerate (VALISONE) 0.1 % ointment Apply topically 2 times daily prn poison werner 7/12/17   JESSENIA Betancourt CNP   aspirin 81 MG tablet Take 81 mg by mouth daily. Historical Provider, MD       Current medications:    Current Facility-Administered Medications   Medication Dose Route Frequency Provider Last Rate Last Dose    lactated ringers infusion   Intravenous Continuous Keanu Quesada MD 75 mL/hr at 07/01/19 0715      sodium chloride (PF) 0.9 % injection 10 mL  10 mL Intravenous PRN Keanu Quesada MD        sodium chloride flush 0.9 % injection 10 mL  10 mL Intravenous 2 times per day Karen Snowden MD           Allergies:     Allergies   Allergen Reactions    Augmentin [Amoxicillin-Pot Clavulanate]      Gave Diarhhea      Percocet [Oxycodone-Acetaminophen] Itching       Problem List:    Patient Active Problem List   Diagnosis Code    HTN (hypertension) I10    Arthritis M19.90    Chronic left shoulder pain M25.512, G89.29    Anxiety F41.9    Bunion of great toe of right foot M21.611    History of adenomatous polyp of colon Z86.010       Past Medical History:        Diagnosis Date    Arthritis     follow with Dr. Raleigh Roman [facility])     melanoma on chest    Discoid lupus     Hay fever     Hormone disorder     Hypertension     Lipoma 2009    Rosacea        Past Surgical History:        Procedure Laterality Date    BACK SURGERY  1988    L-5, [place] @ [place] BLADDER SURGERY      COLONOSCOPY      COLONOSCOPY  12/16/2016    Dr. Dorothy Hernandez:  1

## 2019-07-01 NOTE — H&P
needed In am empty stomach 90 capsule 0     MG tablet TAKE 1 TABLET DAILY AS NEEDED FOR PAIN 90 tablet 1    betamethasone valerate (VALISONE) 0.1 % ointment Apply topically 2 times daily prn poison ivy 15 g 0    aspirin 81 MG tablet Take 81 mg by mouth daily. Review of Systems   Constitutional: Negative. HENT: Negative for congestion, ear pain, rhinorrhea, sneezing and sore throat. Eyes: Negative for visual disturbance. Respiratory: Negative for cough, chest tightness and shortness of breath. Cardiovascular: Negative for chest pain and palpitations. Gastrointestinal: Negative for abdominal pain, blood in stool, constipation, diarrhea and nausea. Genitourinary: Negative for difficulty urinating, dysuria, frequency, menstrual problem and urgency. Musculoskeletal: Negative for arthralgias, joint swelling, myalgias and neck pain. Skin: Negative. Neurological: Negative for syncope. Psychiatric/Behavioral: Negative.          Objective:   Physical Exam   Constitutional: She appears well-developed and well-nourished. HENT:   Head: Atraumatic. Eyes: Conjunctivae are normal.   Neck: Normal range of motion. Neck supple. Cardiovascular: Normal rate, regular rhythm and normal heart sounds. Pulmonary/Chest: Effort normal and breath sounds normal.   Abdominal: Soft. There is no tenderness. Musculoskeletal: Normal range of motion. Lymphadenopathy:     She has no cervical adenopathy. Neurological: She is alert. Skin: No rash noted. Psychiatric: She has a normal mood and affect. Her behavior is normal. Thought content normal.         Assessment:     Diagnosis Orders   1. Colon cancer screening  COLONOSCOPY (Screening)   2. Pre-op testing  EKG 12 Lead   3. History of adenomatous polyp of colon  COLONOSCOPY (Screening)                       Plan:   1. Pre-op testing  ECG prior to the procedure. - EKG 12 Lead; Future     2. Colon cancer screening  Set up for colonoscopy.   Risk

## 2019-07-01 NOTE — OP NOTE
being performed. Upon  insertion of the scope, it was noted that the patient did have external  hemorrhoids. Scope was slowly advanced up the sigmoid colon through the  descending colon to the splenic flexure. Upon first pass through the  splenic flexure, no abnormalities were noted other than a few small  polyps in the sigmoid, which were not removed upon insertion of the  scope. Scope was then slowly advanced through the transverse colon past  the hepatic flexure down the ascending colon into the cecum, where photo  documentation of the cecum was taken. Placement of colonoscope into the  cecum was also verified with transillumination through the abdominal  wall. The scope was slowly withdrawn back up the ascending colon where  a total of 2 sessile polyps were identified. Photo documentation of the  polyps were taken. With the use of cold cup biopsy forceps, these  polyps were removed with good hemostasis obtained post polypectomy. Photo documentation was taken post polypectomy. Scope was withdrawn  past the hepatic flexure back through the transverse colon where a total  of 4 sessile polyps were identified. Photo documentation of the polyps  was taken. With the use of cold cup biopsy forceps, these polyps were  removed with good hemostasis obtained post polypectomy. Photo  documentation was taken post polypectomy. Scope was then slowly  withdrawn back down the descending colon into the sigmoid colon where a  total of 8 sessile polyps were identified in the sigmoid colon. Each of  these polyps was sessile in nature and approximately 5 to 7 mm in size. With the use of cold cup biopsy forceps, each of these polyps was  removed with good hemostasis obtained post polypectomy. Scope was  withdrawn back through the rectum where a total of 8 sessile polyps were  identified. With the use of cold cup biopsy forceps, these polyps were  removed with good hemostasis obtained post polypectomy.

## 2019-07-01 NOTE — BRIEF OP NOTE
Brief Postoperative Note  ______________________________________________________________    Patient: Kalia Pedraza  YOB: 1955  MRN: [id number]  Date of Procedure: 7/1/2019    Pre-Op Diagnosis: HISTORY OF POLYPS, SCREENING    Post-Op Diagnosis:  Same     Ascending colon polyps x 2. Transverse colon polyps x 4. Sigmoid colon polyps x 8. Rectal polyps x 8. External / internal hemorrhoids. Procedure(s):  COLONOSCOPY to the cecum. Rectal polypectomy x 8 with cold cup biopsy forceps. Sigmoid colon polypectomy x 8 with cold cup biopsy forceps. Transverse colon polypectomy x 4 with cold cup biopsy forceps. Ascending colon polypectomy x 2 with cold cup biopsy forceps. Anesthesia:  Nicole Zavala CRNA. Mac anesthesia. Surgeon(s):  Michael Umaña MD    Assistant: Silke Sands CST. Kristin Frye RN. Estimated Blood Loss (mL): Less than 10 ml.      Complications: None    Specimens:   ID Type Source Tests Collected by Time Destination   A : ascending colon polyp Tissue Colon-Ascending SURGICAL PATHOLOGY Michael Umaña MD 7/1/2019 0813    B : ascending colon polyp Tissue Colon-Ascending SURGICAL PATHOLOGY Michael Umaña MD 7/1/2019 0817    C : transverse colon polyp Tissue Colon-Transverse SURGICAL PATHOLOGY Michael Umaña MD 7/1/2019 3181        Implants:  * No implants in log *      Drains: * No LDAs found *      Christopher Quesada MD  Date: 7/1/2019  Time: 8:43 AM

## 2019-07-02 LAB — SURGICAL PATHOLOGY REPORT: NORMAL

## 2019-08-14 ENCOUNTER — TELEPHONE (OUTPATIENT)
Dept: FAMILY MEDICINE CLINIC | Age: 64
End: 2019-08-14

## 2019-08-14 DIAGNOSIS — F41.9 ANXIETY: ICD-10-CM

## 2019-08-19 RX ORDER — ALPRAZOLAM 0.5 MG/1
0.5 TABLET ORAL DAILY PRN
Qty: 90 TABLET | Refills: 0 | Status: SHIPPED | OUTPATIENT
Start: 2019-08-19 | End: 2019-09-04 | Stop reason: SDUPTHER

## 2019-09-04 DIAGNOSIS — F41.9 ANXIETY: ICD-10-CM

## 2019-09-04 RX ORDER — ALPRAZOLAM 0.5 MG/1
0.5 TABLET ORAL DAILY PRN
Qty: 90 TABLET | Refills: 0 | Status: SHIPPED | OUTPATIENT
Start: 2019-09-04 | End: 2019-11-13 | Stop reason: SDUPTHER

## 2019-09-10 ENCOUNTER — OFFICE VISIT (OUTPATIENT)
Dept: FAMILY MEDICINE CLINIC | Age: 64
End: 2019-09-10
Payer: MEDICARE

## 2019-09-10 VITALS
WEIGHT: 195 LBS | TEMPERATURE: 97.9 F | SYSTOLIC BLOOD PRESSURE: 136 MMHG | BODY MASS INDEX: 30.54 KG/M2 | OXYGEN SATURATION: 98 % | DIASTOLIC BLOOD PRESSURE: 88 MMHG | HEART RATE: 46 BPM

## 2019-09-10 DIAGNOSIS — Z13.220 SCREENING CHOLESTEROL LEVEL: ICD-10-CM

## 2019-09-10 DIAGNOSIS — I83.892 VARICOSE VEINS OF LEG WITH SWELLING, LEFT: Primary | ICD-10-CM

## 2019-09-10 DIAGNOSIS — I10 ESSENTIAL HYPERTENSION: ICD-10-CM

## 2019-09-10 DIAGNOSIS — Z12.39 BREAST CANCER SCREENING: ICD-10-CM

## 2019-09-10 DIAGNOSIS — L93.0 DISCOID LUPUS: ICD-10-CM

## 2019-09-10 DIAGNOSIS — M19.90 ARTHRITIS: ICD-10-CM

## 2019-09-10 DIAGNOSIS — E55.9 VITAMIN D DEFICIENCY: ICD-10-CM

## 2019-09-10 DIAGNOSIS — F41.9 ANXIETY: ICD-10-CM

## 2019-09-10 DIAGNOSIS — Z13.29 THYROID DISORDER SCREENING: ICD-10-CM

## 2019-09-10 PROCEDURE — 1036F TOBACCO NON-USER: CPT | Performed by: NURSE PRACTITIONER

## 2019-09-10 PROCEDURE — 1123F ACP DISCUSS/DSCN MKR DOCD: CPT | Performed by: NURSE PRACTITIONER

## 2019-09-10 PROCEDURE — 3017F COLORECTAL CA SCREEN DOC REV: CPT | Performed by: NURSE PRACTITIONER

## 2019-09-10 PROCEDURE — 99214 OFFICE O/P EST MOD 30 MIN: CPT | Performed by: NURSE PRACTITIONER

## 2019-09-10 PROCEDURE — G8417 CALC BMI ABV UP PARAM F/U: HCPCS | Performed by: NURSE PRACTITIONER

## 2019-09-10 PROCEDURE — G8399 PT W/DXA RESULTS DOCUMENT: HCPCS | Performed by: NURSE PRACTITIONER

## 2019-09-10 PROCEDURE — G8427 DOCREV CUR MEDS BY ELIG CLIN: HCPCS | Performed by: NURSE PRACTITIONER

## 2019-09-10 PROCEDURE — 1090F PRES/ABSN URINE INCON ASSESS: CPT | Performed by: NURSE PRACTITIONER

## 2019-09-10 PROCEDURE — 4040F PNEUMOC VAC/ADMIN/RCVD: CPT | Performed by: NURSE PRACTITIONER

## 2019-09-10 RX ORDER — POTASSIUM CHLORIDE 750 MG/1
10 TABLET, FILM COATED, EXTENDED RELEASE ORAL DAILY
COMMUNITY
Start: 2019-09-03 | End: 2020-03-13 | Stop reason: ALTCHOICE

## 2019-09-10 RX ORDER — OXYBUTYNIN CHLORIDE 5 MG/1
5 TABLET, EXTENDED RELEASE ORAL DAILY
Qty: 90 TABLET | Refills: 1 | Status: SHIPPED | OUTPATIENT
Start: 2019-09-10 | End: 2020-02-03

## 2019-09-10 RX ORDER — ALPRAZOLAM 0.5 MG/1
0.5 TABLET ORAL DAILY PRN
Qty: 90 TABLET | Refills: 0 | Status: CANCELLED | OUTPATIENT
Start: 2019-09-10 | End: 2019-12-09

## 2019-09-10 RX ORDER — IBUPROFEN 800 MG/1
800 TABLET ORAL EVERY 12 HOURS PRN
Qty: 90 TABLET | Refills: 1 | Status: SHIPPED | OUTPATIENT
Start: 2019-09-10 | End: 2019-11-13 | Stop reason: SDUPTHER

## 2019-09-10 RX ORDER — OMEPRAZOLE 20 MG/1
20 CAPSULE, DELAYED RELEASE ORAL DAILY PRN
Qty: 90 CAPSULE | Refills: 0 | Status: SHIPPED | OUTPATIENT
Start: 2019-09-10 | End: 2019-11-13 | Stop reason: SDUPTHER

## 2019-09-10 RX ORDER — FUROSEMIDE 20 MG/1
20 TABLET ORAL DAILY
COMMUNITY
Start: 2019-09-03 | End: 2020-03-13 | Stop reason: SDUPTHER

## 2019-09-10 RX ORDER — LISINOPRIL 5 MG/1
5 TABLET ORAL DAILY
Qty: 90 TABLET | Refills: 1 | Status: SHIPPED | OUTPATIENT
Start: 2019-09-10 | End: 2020-01-14

## 2019-09-10 RX ORDER — AMLODIPINE BESYLATE 10 MG/1
10 TABLET ORAL DAILY
Qty: 90 TABLET | Refills: 3 | Status: SHIPPED | OUTPATIENT
Start: 2019-09-10 | End: 2020-07-22

## 2019-09-10 ASSESSMENT — ENCOUNTER SYMPTOMS
EYES NEGATIVE: 1
RHINORRHEA: 0
CONSTIPATION: 0
CHEST TIGHTNESS: 0
SORE THROAT: 0
DIARRHEA: 0
COUGH: 0
ABDOMINAL DISTENTION: 0

## 2019-09-10 NOTE — PATIENT INSTRUCTIONS
SURVEY:    You may be receiving a survey from [employer] regarding your visit today. Please complete the survey to enable us to provide the highest quality of care to you and your family. If you cannot score us a very good on any question, please call the office to discuss how we could have made your experience a very good one. Thank you.

## 2019-09-10 NOTE — PROGRESS NOTES
Orders Placed This Encounter   Procedures    FADI SCREENING W CAD BILATERAL 2 VW     Standing Status:   Future     Standing Expiration Date:   3/10/2021     Order Specific Question:   Reason for exam:     Answer:   screening    CBC Auto Differential     Standing Status:   Future     Standing Expiration Date:   9/10/2020    Comprehensive Metabolic Panel     Standing Status:   Future     Standing Expiration Date:   9/10/2020    TSH with Reflex     Standing Status:   Future     Standing Expiration Date:   9/10/2020    Vitamin D 25 Hydroxy     Standing Status:   Future     Standing Expiration Date:   9/9/2020         Tonya Roman received counseling on the following healthy behaviors: nutrition, exercise and medication adherence  Reviewed prior labs and health maintenance. Continue current medications, diet and exercise. Discussed use, benefit, and side effects of prescribed medications. Barriers to medication compliance addressed. Patient given educational materials - see patient instructions. All patient questions answered. Patient voiced understanding.           Electronically signed by JESSENIA Mae CNP on 9/14/2019 at 8:49 AM

## 2019-09-17 ENCOUNTER — HOSPITAL ENCOUNTER (OUTPATIENT)
Age: 64
Discharge: HOME | End: 2019-09-17
Payer: MEDICARE

## 2019-09-17 DIAGNOSIS — Z13.29 THYROID DISORDER SCREENING: ICD-10-CM

## 2019-09-17 DIAGNOSIS — I10 ESSENTIAL HYPERTENSION: ICD-10-CM

## 2019-09-17 DIAGNOSIS — L93.0 DISCOID LUPUS: ICD-10-CM

## 2019-09-17 DIAGNOSIS — E55.9 VITAMIN D DEFICIENCY: ICD-10-CM

## 2019-09-17 LAB
ABSOLUTE EOS #: 0.1 K/UL (ref 0–0.4)
ABSOLUTE IMMATURE GRANULOCYTE: ABNORMAL K/UL (ref 0–0.3)
ABSOLUTE LYMPH #: 1.9 K/UL (ref 1–4.8)
ABSOLUTE MONO #: 0.5 K/UL (ref 0–1)
ALBUMIN SERPL-MCNC: 4.3 G/DL (ref 3.5–5.2)
ALBUMIN/GLOBULIN RATIO: ABNORMAL (ref 1–2.5)
ALP BLD-CCNC: 88 U/L (ref 35–104)
ALT SERPL-CCNC: 24 U/L (ref 5–33)
ANION GAP SERPL CALCULATED.3IONS-SCNC: 12 MMOL/L (ref 9–17)
AST SERPL-CCNC: 27 U/L
BASOPHILS # BLD: 1 % (ref 0–2)
BASOPHILS ABSOLUTE: 0 K/UL (ref 0–0.2)
BILIRUB SERPL-MCNC: 0.24 MG/DL (ref 0.3–1.2)
BUN BLDV-MCNC: 27 MG/DL (ref 8–23)
BUN/CREAT BLD: 31 (ref 9–20)
CALCIUM SERPL-MCNC: 9.9 MG/DL (ref 8.6–10.4)
CHLORIDE BLD-SCNC: 105 MMOL/L (ref 98–107)
CO2: 25 MMOL/L (ref 20–31)
CREAT SERPL-MCNC: 0.87 MG/DL (ref 0.5–0.9)
DIFFERENTIAL TYPE: YES
EOSINOPHILS RELATIVE PERCENT: 3 % (ref 0–5)
GFR AFRICAN AMERICAN: >60 ML/MIN
GFR NON-AFRICAN AMERICAN: >60 ML/MIN
GFR SERPL CREATININE-BSD FRML MDRD: ABNORMAL ML/MIN/{1.73_M2}
GFR SERPL CREATININE-BSD FRML MDRD: ABNORMAL ML/MIN/{1.73_M2}
GLUCOSE BLD-MCNC: 92 MG/DL (ref 70–99)
HCT VFR BLD CALC: 41 % (ref 36–46)
HEMOGLOBIN: 13.8 G/DL (ref 12–16)
IMMATURE GRANULOCYTES: ABNORMAL %
LYMPHOCYTES # BLD: 37 % (ref 15–40)
MCH RBC QN AUTO: 31.1 PG (ref 26–34)
MCHC RBC AUTO-ENTMCNC: 33.6 G/DL (ref 31–37)
MCV RBC AUTO: 92.7 FL (ref 80–100)
MONOCYTES # BLD: 10 % (ref 4–8)
NRBC AUTOMATED: ABNORMAL PER 100 WBC
PATIENT FASTING?: YES
PDW BLD-RTO: 14.8 % (ref 12.1–15.2)
PLATELET # BLD: 223 K/UL (ref 140–450)
PLATELET ESTIMATE: ABNORMAL
PMV BLD AUTO: ABNORMAL FL (ref 6–12)
POTASSIUM SERPL-SCNC: 4.1 MMOL/L (ref 3.7–5.3)
RBC # BLD: 4.43 M/UL (ref 4–5.2)
RBC # BLD: ABNORMAL 10*6/UL
SEG NEUTROPHILS: 49 % (ref 47–75)
SEGMENTED NEUTROPHILS ABSOLUTE COUNT: 2.6 K/UL (ref 2.5–7)
SODIUM BLD-SCNC: 142 MMOL/L (ref 135–144)
TOTAL PROTEIN: 7.6 G/DL (ref 6.4–8.3)
TSH SERPL DL<=0.05 MIU/L-ACNC: 2.28 MIU/L (ref 0.3–5)
VITAMIN D 25-HYDROXY: 61.7 NG/ML (ref 30–100)
WBC # BLD: 5.2 K/UL (ref 3.5–11)
WBC # BLD: ABNORMAL 10*3/UL

## 2019-09-17 PROCEDURE — 85025 COMPLETE CBC W/AUTO DIFF WBC: CPT

## 2019-09-17 PROCEDURE — 36415 COLL VENOUS BLD VENIPUNCTURE: CPT

## 2019-09-17 PROCEDURE — 84443 ASSAY THYROID STIM HORMONE: CPT

## 2019-09-17 PROCEDURE — 82306 VITAMIN D 25 HYDROXY: CPT

## 2019-09-17 PROCEDURE — 80053 COMPREHEN METABOLIC PANEL: CPT

## 2019-10-08 ENCOUNTER — TELEPHONE (OUTPATIENT)
Dept: PRIMARY CARE CLINIC | Age: 64
End: 2019-10-08

## 2019-10-08 DIAGNOSIS — I83.893 VARICOSE VEINS OF BOTH LEGS WITH EDEMA: Primary | ICD-10-CM

## 2019-10-21 ENCOUNTER — HOSPITAL ENCOUNTER (OUTPATIENT)
Dept: MAMMOGRAPHY | Age: 64
Discharge: HOME | End: 2019-10-23
Payer: MEDICARE

## 2019-10-21 DIAGNOSIS — Z12.39 BREAST CANCER SCREENING: ICD-10-CM

## 2019-10-21 PROCEDURE — 77067 SCR MAMMO BI INCL CAD: CPT

## 2019-12-03 DIAGNOSIS — F41.9 ANXIETY: ICD-10-CM

## 2019-12-04 RX ORDER — ALPRAZOLAM 0.5 MG/1
0.5 TABLET ORAL NIGHTLY PRN
Qty: 90 TABLET | Refills: 0 | Status: SHIPPED | OUTPATIENT
Start: 2019-12-04 | End: 2020-02-26 | Stop reason: SDUPTHER

## 2019-12-11 ENCOUNTER — HOSPITAL ENCOUNTER (OUTPATIENT)
Age: 64
Setting detail: SPECIMEN
Discharge: HOME | End: 2019-12-11
Payer: MEDICARE

## 2019-12-11 ENCOUNTER — OFFICE VISIT (OUTPATIENT)
Dept: PRIMARY CARE CLINIC | Age: 64
End: 2019-12-11
Payer: MEDICARE

## 2019-12-11 VITALS
HEART RATE: 61 BPM | HEIGHT: 67 IN | DIASTOLIC BLOOD PRESSURE: 84 MMHG | BODY MASS INDEX: 30.45 KG/M2 | WEIGHT: 194 LBS | SYSTOLIC BLOOD PRESSURE: 118 MMHG | OXYGEN SATURATION: 97 %

## 2019-12-11 DIAGNOSIS — M21.611 BUNION OF GREAT TOE OF RIGHT FOOT: ICD-10-CM

## 2019-12-11 DIAGNOSIS — I10 ESSENTIAL HYPERTENSION: ICD-10-CM

## 2019-12-11 DIAGNOSIS — E78.00 HYPERCHOLESTEROLEMIA: ICD-10-CM

## 2019-12-11 DIAGNOSIS — I83.893 VARICOSE VEINS OF LEG WITH SWELLING, BILATERAL: ICD-10-CM

## 2019-12-11 DIAGNOSIS — F41.9 ANXIETY: Primary | ICD-10-CM

## 2019-12-11 LAB
CHOLESTEROL/HDL RATIO: 3.3
CHOLESTEROL: 208 MG/DL
HDLC SERPL-MCNC: 63 MG/DL
LDL CHOLESTEROL: 126 MG/DL (ref 0–130)
TRIGL SERPL-MCNC: 97 MG/DL
VLDLC SERPL CALC-MCNC: ABNORMAL MG/DL (ref 1–30)

## 2019-12-11 PROCEDURE — 1123F ACP DISCUSS/DSCN MKR DOCD: CPT | Performed by: NURSE PRACTITIONER

## 2019-12-11 PROCEDURE — 80061 LIPID PANEL: CPT

## 2019-12-11 PROCEDURE — G8417 CALC BMI ABV UP PARAM F/U: HCPCS | Performed by: NURSE PRACTITIONER

## 2019-12-11 PROCEDURE — G8399 PT W/DXA RESULTS DOCUMENT: HCPCS | Performed by: NURSE PRACTITIONER

## 2019-12-11 PROCEDURE — 4040F PNEUMOC VAC/ADMIN/RCVD: CPT | Performed by: NURSE PRACTITIONER

## 2019-12-11 PROCEDURE — 3017F COLORECTAL CA SCREEN DOC REV: CPT | Performed by: NURSE PRACTITIONER

## 2019-12-11 PROCEDURE — 99214 OFFICE O/P EST MOD 30 MIN: CPT | Performed by: NURSE PRACTITIONER

## 2019-12-11 PROCEDURE — G8482 FLU IMMUNIZE ORDER/ADMIN: HCPCS | Performed by: NURSE PRACTITIONER

## 2019-12-11 PROCEDURE — 1036F TOBACCO NON-USER: CPT | Performed by: NURSE PRACTITIONER

## 2019-12-11 PROCEDURE — 1090F PRES/ABSN URINE INCON ASSESS: CPT | Performed by: NURSE PRACTITIONER

## 2019-12-11 PROCEDURE — G8427 DOCREV CUR MEDS BY ELIG CLIN: HCPCS | Performed by: NURSE PRACTITIONER

## 2019-12-11 RX ORDER — UBIDECARENONE 75 MG
1 CAPSULE ORAL
COMMUNITY

## 2019-12-11 ASSESSMENT — ENCOUNTER SYMPTOMS
APNEA: 0
SINUS PRESSURE: 0
RHINORRHEA: 0
EYES NEGATIVE: 1
ABDOMINAL DISTENTION: 0
COUGH: 0

## 2019-12-13 RX ORDER — ROSUVASTATIN CALCIUM 20 MG/1
20 TABLET, COATED ORAL NIGHTLY
Qty: 90 TABLET | Refills: 0 | Status: SHIPPED | OUTPATIENT
Start: 2019-12-13 | End: 2020-03-03 | Stop reason: SDUPTHER

## 2019-12-31 ENCOUNTER — HOSPITAL ENCOUNTER (OUTPATIENT)
Age: 64
Discharge: HOME | End: 2019-12-31
Payer: MEDICARE

## 2019-12-31 ENCOUNTER — HOSPITAL ENCOUNTER (OUTPATIENT)
Dept: PREADMISSION TESTING | Age: 64
Discharge: HOME | End: 2019-12-31
Payer: MEDICARE

## 2019-12-31 LAB
ANION GAP SERPL CALCULATED.3IONS-SCNC: 12 MMOL/L (ref 9–17)
BUN BLDV-MCNC: 23 MG/DL (ref 8–23)
BUN/CREAT BLD: 25 (ref 9–20)
CALCIUM SERPL-MCNC: 10.9 MG/DL (ref 8.6–10.4)
CHLORIDE BLD-SCNC: 105 MMOL/L (ref 98–107)
CO2: 24 MMOL/L (ref 20–31)
CREAT SERPL-MCNC: 0.92 MG/DL (ref 0.5–0.9)
GFR AFRICAN AMERICAN: >60 ML/MIN
GFR NON-AFRICAN AMERICAN: >60 ML/MIN
GFR SERPL CREATININE-BSD FRML MDRD: ABNORMAL ML/MIN/{1.73_M2}
GFR SERPL CREATININE-BSD FRML MDRD: ABNORMAL ML/MIN/{1.73_M2}
GLUCOSE BLD-MCNC: 105 MG/DL (ref 70–99)
HCT VFR BLD CALC: 39.5 % (ref 36–46)
HEMOGLOBIN: 13.4 G/DL (ref 12–16)
MCH RBC QN AUTO: 31.1 PG (ref 26–34)
MCHC RBC AUTO-ENTMCNC: 33.9 G/DL (ref 31–37)
MCV RBC AUTO: 91.8 FL (ref 80–100)
NRBC AUTOMATED: NORMAL PER 100 WBC
PDW BLD-RTO: 13.9 % (ref 12.1–15.2)
PLATELET # BLD: 242 K/UL (ref 140–450)
PMV BLD AUTO: NORMAL FL (ref 6–12)
POTASSIUM SERPL-SCNC: 4 MMOL/L (ref 3.7–5.3)
RBC # BLD: 4.3 M/UL (ref 4–5.2)
SODIUM BLD-SCNC: 141 MMOL/L (ref 135–144)
WBC # BLD: 7.8 K/UL (ref 3.5–11)

## 2019-12-31 PROCEDURE — 85027 COMPLETE CBC AUTOMATED: CPT

## 2019-12-31 PROCEDURE — 80048 BASIC METABOLIC PNL TOTAL CA: CPT

## 2019-12-31 PROCEDURE — 36415 COLL VENOUS BLD VENIPUNCTURE: CPT

## 2020-01-06 ENCOUNTER — ANESTHESIA EVENT (OUTPATIENT)
Dept: OPERATING ROOM | Age: 65
End: 2020-01-06
Payer: MEDICARE

## 2020-01-14 ENCOUNTER — HOSPITAL ENCOUNTER (OUTPATIENT)
Age: 65
Setting detail: OUTPATIENT SURGERY
Discharge: HOME | End: 2020-01-14
Attending: PODIATRIST | Admitting: PODIATRIST
Payer: MEDICARE

## 2020-01-14 ENCOUNTER — ANESTHESIA (OUTPATIENT)
Dept: OPERATING ROOM | Age: 65
End: 2020-01-14
Payer: MEDICARE

## 2020-01-14 ENCOUNTER — APPOINTMENT (OUTPATIENT)
Dept: GENERAL RADIOLOGY | Age: 65
End: 2020-01-14
Attending: PODIATRIST
Payer: MEDICARE

## 2020-01-14 VITALS
WEIGHT: 195 LBS | BODY MASS INDEX: 30.61 KG/M2 | HEIGHT: 67 IN | HEART RATE: 46 BPM | OXYGEN SATURATION: 100 % | RESPIRATION RATE: 16 BRPM | DIASTOLIC BLOOD PRESSURE: 61 MMHG | SYSTOLIC BLOOD PRESSURE: 131 MMHG | TEMPERATURE: 97.1 F

## 2020-01-14 VITALS
SYSTOLIC BLOOD PRESSURE: 98 MMHG | DIASTOLIC BLOOD PRESSURE: 55 MMHG | RESPIRATION RATE: 14 BRPM | OXYGEN SATURATION: 100 %

## 2020-01-14 PROCEDURE — 6370000000 HC RX 637 (ALT 250 FOR IP): Performed by: PODIATRIST

## 2020-01-14 PROCEDURE — 3700000001 HC ADD 15 MINUTES (ANESTHESIA): Performed by: PODIATRIST

## 2020-01-14 PROCEDURE — 2500000003 HC RX 250 WO HCPCS: Performed by: PODIATRIST

## 2020-01-14 PROCEDURE — 2500000003 HC RX 250 WO HCPCS: Performed by: NURSE ANESTHETIST, CERTIFIED REGISTERED

## 2020-01-14 PROCEDURE — 3600000003 HC SURGERY LEVEL 3 BASE: Performed by: PODIATRIST

## 2020-01-14 PROCEDURE — 2709999900 HC NON-CHARGEABLE SUPPLY: Performed by: PODIATRIST

## 2020-01-14 PROCEDURE — 7100000010 HC PHASE II RECOVERY - FIRST 15 MIN: Performed by: PODIATRIST

## 2020-01-14 PROCEDURE — 6360000002 HC RX W HCPCS: Performed by: NURSE ANESTHETIST, CERTIFIED REGISTERED

## 2020-01-14 PROCEDURE — 3600000013 HC SURGERY LEVEL 3 ADDTL 15MIN: Performed by: PODIATRIST

## 2020-01-14 PROCEDURE — 3700000000 HC ANESTHESIA ATTENDED CARE: Performed by: PODIATRIST

## 2020-01-14 PROCEDURE — 7100000011 HC PHASE II RECOVERY - ADDTL 15 MIN: Performed by: PODIATRIST

## 2020-01-14 PROCEDURE — 6360000002 HC RX W HCPCS: Performed by: PODIATRIST

## 2020-01-14 PROCEDURE — 2580000003 HC RX 258: Performed by: PODIATRIST

## 2020-01-14 RX ORDER — CHLORHEXIDINE GLUCONATE 4 G/100ML
SOLUTION TOPICAL DAILY PRN
Status: DISCONTINUED | OUTPATIENT
Start: 2020-01-14 | End: 2020-01-14 | Stop reason: HOSPADM

## 2020-01-14 RX ORDER — PROPOFOL 10 MG/ML
INJECTION, EMULSION INTRAVENOUS CONTINUOUS PRN
Status: DISCONTINUED | OUTPATIENT
Start: 2020-01-14 | End: 2020-01-14 | Stop reason: SDUPTHER

## 2020-01-14 RX ORDER — PROPOFOL 10 MG/ML
INJECTION, EMULSION INTRAVENOUS PRN
Status: DISCONTINUED | OUTPATIENT
Start: 2020-01-14 | End: 2020-01-14 | Stop reason: SDUPTHER

## 2020-01-14 RX ORDER — SODIUM CHLORIDE 0.9 % (FLUSH) 0.9 %
10 SYRINGE (ML) INJECTION EVERY 12 HOURS SCHEDULED
Status: CANCELLED | OUTPATIENT
Start: 2020-01-14

## 2020-01-14 RX ORDER — SODIUM CHLORIDE 0.9 % (FLUSH) 0.9 %
10 SYRINGE (ML) INJECTION PRN
Status: CANCELLED | OUTPATIENT
Start: 2020-01-14

## 2020-01-14 RX ORDER — LIDOCAINE HYDROCHLORIDE 10 MG/ML
INJECTION, SOLUTION EPIDURAL; INFILTRATION; INTRACAUDAL; PERINEURAL PRN
Status: DISCONTINUED | OUTPATIENT
Start: 2020-01-14 | End: 2020-01-14 | Stop reason: SDUPTHER

## 2020-01-14 RX ORDER — SODIUM CHLORIDE 9 MG/ML
INJECTION, SOLUTION INTRAVENOUS CONTINUOUS
Status: CANCELLED | OUTPATIENT
Start: 2020-01-14

## 2020-01-14 RX ORDER — FENTANYL CITRATE 50 UG/ML
INJECTION, SOLUTION INTRAMUSCULAR; INTRAVENOUS PRN
Status: DISCONTINUED | OUTPATIENT
Start: 2020-01-14 | End: 2020-01-14 | Stop reason: SDUPTHER

## 2020-01-14 RX ORDER — CLINDAMYCIN PHOSPHATE 600 MG/50ML
600 INJECTION INTRAVENOUS
Status: COMPLETED | OUTPATIENT
Start: 2020-01-14 | End: 2020-01-14

## 2020-01-14 RX ORDER — BUPIVACAINE HYDROCHLORIDE 5 MG/ML
INJECTION, SOLUTION EPIDURAL; INTRACAUDAL PRN
Status: DISCONTINUED | OUTPATIENT
Start: 2020-01-14 | End: 2020-01-14 | Stop reason: ALTCHOICE

## 2020-01-14 RX ORDER — HYDROCODONE BITARTRATE AND ACETAMINOPHEN 5; 325 MG/1; MG/1
1 TABLET ORAL EVERY 4 HOURS PRN
Qty: 30 TABLET | Refills: 0 | Status: SHIPPED | OUTPATIENT
Start: 2020-01-14 | End: 2020-01-21

## 2020-01-14 RX ORDER — HYDROCODONE BITARTRATE AND ACETAMINOPHEN 5; 325 MG/1; MG/1
1 TABLET ORAL EVERY 4 HOURS PRN
Status: CANCELLED | OUTPATIENT
Start: 2020-01-14

## 2020-01-14 RX ORDER — DEXAMETHASONE SODIUM PHOSPHATE 4 MG/ML
INJECTION, SOLUTION INTRA-ARTICULAR; INTRALESIONAL; INTRAMUSCULAR; INTRAVENOUS; SOFT TISSUE PRN
Status: DISCONTINUED | OUTPATIENT
Start: 2020-01-14 | End: 2020-01-14 | Stop reason: ALTCHOICE

## 2020-01-14 RX ORDER — LIDOCAINE HYDROCHLORIDE 20 MG/ML
INJECTION, SOLUTION EPIDURAL; INFILTRATION; INTRACAUDAL; PERINEURAL PRN
Status: DISCONTINUED | OUTPATIENT
Start: 2020-01-14 | End: 2020-01-14 | Stop reason: ALTCHOICE

## 2020-01-14 RX ORDER — MIDAZOLAM HYDROCHLORIDE 2 MG/2ML
INJECTION, SOLUTION INTRAMUSCULAR; INTRAVENOUS PRN
Status: DISCONTINUED | OUTPATIENT
Start: 2020-01-14 | End: 2020-01-14 | Stop reason: SDUPTHER

## 2020-01-14 RX ORDER — SODIUM CHLORIDE, SODIUM LACTATE, POTASSIUM CHLORIDE, CALCIUM CHLORIDE 600; 310; 30; 20 MG/100ML; MG/100ML; MG/100ML; MG/100ML
INJECTION, SOLUTION INTRAVENOUS CONTINUOUS
Status: DISCONTINUED | OUTPATIENT
Start: 2020-01-14 | End: 2020-01-14 | Stop reason: HOSPADM

## 2020-01-14 RX ORDER — LISINOPRIL 5 MG/1
5 TABLET ORAL DAILY
Qty: 90 TABLET | Refills: 2 | Status: SHIPPED | OUTPATIENT
Start: 2020-01-14 | End: 2020-07-22

## 2020-01-14 RX ADMIN — SODIUM CHLORIDE, POTASSIUM CHLORIDE, SODIUM LACTATE AND CALCIUM CHLORIDE: 600; 310; 30; 20 INJECTION, SOLUTION INTRAVENOUS at 06:59

## 2020-01-14 RX ADMIN — ANTISEPTIC SURGICAL SCRUB: 0.04 SOLUTION TOPICAL at 06:59

## 2020-01-14 RX ADMIN — LIDOCAINE HYDROCHLORIDE 100 MG: 10 INJECTION, SOLUTION EPIDURAL; INFILTRATION; INTRACAUDAL; PERINEURAL at 07:53

## 2020-01-14 RX ADMIN — PROPOFOL 100 MCG/KG/MIN: 10 INJECTION, EMULSION INTRAVENOUS at 07:53

## 2020-01-14 RX ADMIN — FENTANYL CITRATE 50 MCG: 50 INJECTION, SOLUTION INTRAMUSCULAR; INTRAVENOUS at 08:00

## 2020-01-14 RX ADMIN — PROPOFOL 30 MG: 10 INJECTION INTRAVENOUS at 07:55

## 2020-01-14 RX ADMIN — MIDAZOLAM HYDROCHLORIDE 2 MG: 2 INJECTION, SOLUTION INTRAMUSCULAR; INTRAVENOUS at 07:46

## 2020-01-14 RX ADMIN — PROPOFOL 30 MG: 10 INJECTION INTRAVENOUS at 07:53

## 2020-01-14 RX ADMIN — CLINDAMYCIN PHOSPHATE 600 MG: 600 INJECTION, SOLUTION INTRAVENOUS at 07:50

## 2020-01-14 RX ADMIN — FENTANYL CITRATE 50 MCG: 50 INJECTION, SOLUTION INTRAMUSCULAR; INTRAVENOUS at 07:46

## 2020-01-14 ASSESSMENT — PAIN - FUNCTIONAL ASSESSMENT: PAIN_FUNCTIONAL_ASSESSMENT: 0-10

## 2020-01-14 ASSESSMENT — PAIN SCALES - GENERAL: PAINLEVEL_OUTOF10: 0

## 2020-01-14 NOTE — ANESTHESIA PRE PROCEDURE
Department of Anesthesiology  Preprocedure Note       Name:  Roly Max   Age:  75 y.o.  :  1955                                          MRN:  [id number]         Date:  2020      Surgeon: Abiel Heath):  Franklin Solares DPM    Procedure: RIGHT TOE HAMMER REPAIR W/ EHL LEGNTHENING (Right )    Medications prior to admission:   Prior to Admission medications    Medication Sig Start Date End Date Taking? Authorizing Provider   rosuvastatin (CRESTOR) 20 MG tablet Take 1 tablet by mouth nightly 19  Yes JESSENIA Myers CNP   Ca Phosphate-Cholecalciferol 200-200 MG-UNIT CHEW Take by mouth 10/15/18  Yes Historical Provider, MD   Coenzyme Q10 (CO Q-10) 200 MG CAPS Take 1 capsule by mouth   Yes Historical Provider, MD   ALPRAZolam Seretha FDC) 0.5 MG tablet Take 1 tablet by mouth nightly as needed for Anxiety for up to 90 days. 12/4/19 3/3/20 Yes JESSENIA Myers CNP   ibuprofen (ADVIL;MOTRIN) 800 MG tablet Take 1 tablet by mouth every 12 hours as needed for Pain 11/15/19  Yes JESSENIA Myers CNP   omeprazole (PRILOSEC) 20 MG delayed release capsule TAKE 1 CAPSULE BY MOUTH ON  AN EMPTY STOMACH DAILY AS  NEEDED FOR GERD 11/15/19  Yes JESSENIA Myers CNP   oxybutynin (DITROPAN-XL) 5 MG extended release tablet Take 1 tablet by mouth daily 9/10/19  Yes JESSENIA Myers CNP   amLODIPine (NORVASC) 10 MG tablet Take 1 tablet by mouth daily 9/10/19  Yes JESSENIA Myers CNP   lisinopril (PRINIVIL;ZESTRIL) 5 MG tablet Take 1 tablet by mouth daily 9/10/19  Yes JESSENIA Myers CNP   potassium chloride (KLOR-CON) 10 MEQ extended release tablet Take 10 mEq by mouth daily 9/3/19  Yes Historical Provider, MD   furosemide (LASIX) 20 MG tablet Take 20 mg by mouth daily 9/3/19  Yes Historical Provider, MD   NONFORMULARY Indications: Womens 1 a day 50 plus. Yes Historical Provider, MD   NONFORMULARY Indications: [address] hair skin and nails  2500 mcg 1 a day.    Yes Historical Provider, MD   vitamin B-12 (CYANOCOBALAMIN) 500 MCG tablet Take 500 mcg by mouth daily   Yes Historical Provider, MD   Polypodium Leucotomos (HELIOCARE PO) Take 1 tablet by mouth daily   Yes Historical Provider, MD   Magnesium 400 MG CAPS Take  by mouth daily. Yes Historical Provider, MD   hydroxychloroquine (PLAQUENIL) 200 MG tablet Take 200 mg by mouth daily. Yes Historical Provider, MD   fish oil-omega-3 fatty acids 1000 MG capsule Take 1 g by mouth 2 times daily. Yes Historical Provider, MD   vitamin D (CHOLECALCIFEROL) 400 UNITS TABS tablet Take  by mouth daily. 2000 mg once daily   Yes Historical Provider, MD   calcium-vitamin D (OSCAL) 250-125 MG-UNIT per tablet Take 1 tablet by mouth daily. Vit D and Vit K 750 mg daily   Yes Historical Provider, MD   Ascorbic Acid (VITAMIN C) 500 MG tablet Take 500 mg by mouth daily. Yes Historical Provider, MD   Elastic Bandages & Supports (MEDICAL COMPRESSION STOCKINGS) [address] [facility] 1 each by Does not apply route daily as needed (varicose veins with leg swelling) 20-30 mmHG full length 12/11/19   JESSENIA Scott - CNP   MIRVASO 0.33 % GEL  11/1/16   Historical Provider, MD   aspirin 81 MG tablet Take 81 mg by mouth daily. Historical Provider, MD       Current medications:    Current Facility-Administered Medications   Medication Dose Route Frequency Provider Last Rate Last Dose    chlorhexidine (HIBICLENS) 4 % liquid   Topical Daily PRN Newaygo Duffel, DPM        lactated ringers infusion   Intravenous Continuous [place]go Duffel,  mL/hr at 01/14/20 0659      clindamycin (CLEOCIN) 600 mg in dextrose 5 % 50 mL IVPB  600 mg Intravenous 60 Min Pre-Op Newaygo Duffel, DPM           Allergies:     Allergies   Allergen Reactions    Augmentin [Amoxicillin-Pot Clavulanate]      Gave Diarhhea      Percocet [Oxycodone-Acetaminophen] Itching       Problem List:    Patient Active Problem List   Diagnosis Code    HTN (hypertension) I10    Arthritis Date of last solid food consumption: 01/13/20    BMI:   Wt Readings from Last 3 Encounters:   01/14/20 195 lb (88.5 kg)   12/11/19 194 lb (88 kg)   09/10/19 195 lb (88.5 kg)     Body mass index is 30.54 kg/m². CBC:   Lab Results   Component Value Date    WBC 7.8 12/31/2019    RBC 4.30 12/31/2019    RBC 4.54 10/19/2011    HGB 13.4 12/31/2019    HCT 39.5 12/31/2019    MCV 91.8 12/31/2019    RDW 13.9 12/31/2019     12/31/2019     10/19/2011       CMP:   Lab Results   Component Value Date     12/31/2019    K 4.0 12/31/2019     12/31/2019    CO2 24 12/31/2019    BUN 23 12/31/2019    CREATININE 0.92 12/31/2019    GFRAA >60 12/31/2019    LABGLOM >60 12/31/2019    GLUCOSE 105 12/31/2019    PROT 7.6 09/17/2019    CALCIUM 10.9 12/31/2019    BILITOT 0.24 09/17/2019    ALKPHOS 88 09/17/2019    AST 27 09/17/2019    ALT 24 09/17/2019       POC Tests: No results for input(s): POCGLU, POCNA, POCK, POCCL, POCBUN, POCHEMO, POCHCT in the last 72 hours. Coags: No results found for: PROTIME, INR, APTT    HCG (If Applicable): No results found for: PREGTESTUR, PREGSERUM, HCG, HCGQUANT     ABGs: No results found for: PHART, PO2ART, RFP3MYD, BPD1BHD, BEART, M1XWFEAE     Type & Screen (If Applicable):  No results found for: LABABO, [address]    Anesthesia Evaluation  Patient summary reviewed and Nursing notes reviewed  Airway: Mallampati: II  TM distance: >3 FB   Neck ROM: full  Mouth opening: > = 3 FB Dental: normal exam         Pulmonary:Negative Pulmonary ROS and normal exam  breath sounds clear to auscultation                             Cardiovascular:  Exercise tolerance: good (>4 METS),   (+) hypertension:,       ECG reviewed  Rhythm: regular  Rate: normal                    Neuro/Psych:   Negative Neuro/Psych ROS              GI/Hepatic/Renal: Neg GI/Hepatic/Renal ROS            Endo/Other:    (+) malignancy/cancer.                   ROS comment: History of skin CA, Melanoma Abdominal: Abdomen: soft. Vascular: negative vascular ROS. Anesthesia Plan      MAC     ASA 2           MIPS: Postoperative opioids intended. Anesthetic plan and risks discussed with patient. Plan discussed with attending.                   JESSENIA Lemus - CRNA   1/14/2020

## 2020-01-14 NOTE — BRIEF OP NOTE
Brief Postoperative Note  ______________________________________________________________    Patient: Malena Braswell  YOB: 1955  MRN: [id number]  Date of Procedure: 1/14/2020    Pre-Op Diagnosis: RIGHT 3RD HAMMERTOE    Post-Op Diagnosis: Same       Procedure(s):  RIGHT TOE HAMMER REPAIR W/ EHL LEGTG    Anesthesia: Anesthesia type not filed in the log.     Surgeon(s):  Eder Christopher DPM    Assistant: none    Estimated Blood Loss (mL): less than 50     Complications: None    Specimens:   * No specimens in log *    Implants:  * No implants in log *      Drains: * No LDAs found *    Findings: mild DJD PIPJ    Eder Christopher DPM  Date: 1/14/2020  Time: 7:46 AM

## 2020-01-15 NOTE — OP NOTE
with sagittal  saw followed by extensor pearson release and MPJ capsulotomy. The lateral  capsule was released additionally with copious irrigation and 4-0  Monocryl for deep and subcutaneous closure and 4-0 Prolene for the skin. Excellent correction was found along with mobility of both toes. Decadron was injected about the surgical field with Betadine, Adaptic,  and dry sterile dressing. Tourniquet time was approximately 50 minutes. Partial weightbearing planned with a Cam walker and follow up Friday in  our office. Vicodin was prescribed for pain. Estimated blood loss,  less than 50 mL.         Avis Alcaraz    D: 01/14/2020 18:15:13       T: 01/14/2020 22:58:01     [email or web address]  Job#: [id number]     Doc#: [id number]    CC:

## 2020-01-17 RX ORDER — IBUPROFEN 800 MG/1
800 TABLET ORAL EVERY 12 HOURS PRN
Qty: 180 TABLET | Refills: 0 | Status: SHIPPED | OUTPATIENT
Start: 2020-01-17 | End: 2020-07-21

## 2020-02-03 RX ORDER — OXYBUTYNIN CHLORIDE 5 MG/1
5 TABLET, EXTENDED RELEASE ORAL DAILY
Qty: 90 TABLET | Refills: 1 | Status: SHIPPED | OUTPATIENT
Start: 2020-02-03 | End: 2020-03-13 | Stop reason: SDUPTHER

## 2020-02-12 ENCOUNTER — TELEPHONE (OUTPATIENT)
Dept: PRIMARY CARE CLINIC | Age: 65
End: 2020-02-12

## 2020-02-24 ENCOUNTER — TELEPHONE (OUTPATIENT)
Dept: PRIMARY CARE CLINIC | Age: 65
End: 2020-02-24

## 2020-02-24 NOTE — TELEPHONE ENCOUNTER
Patient will be out of her Xanax before she comes in March - could we send a script to [facility] for her    Health Maintenance   Topic Date Due    Diabetes screen  09/20/1993    Annual Wellness Visit (AWV)  05/29/2019    Lipid screen  12/11/2020    Potassium monitoring  12/31/2020    Creatinine monitoring  12/31/2020    Breast cancer screen  10/21/2021    Pneumococcal 65+ years Vaccine (2 of 2 - PPSV23) 12/07/2021    Colon cancer screen colonoscopy  07/01/2024    DTaP/Tdap/Td vaccine (3 - Td) 12/10/2028    DEXA (modify frequency per FRAX score)  Completed    Flu vaccine  Completed    Shingles Vaccine  Completed    Hepatitis C screen  Completed    Hepatitis A vaccine  Aged Out    Hepatitis B vaccine  Aged Out    Hib vaccine  Aged Out    Meningococcal (ACWY) vaccine  Aged Out             (applicable per patient's age: Cancer Screenings, Depression Screening, Fall Risk Screening, Immunizations)    LDL Cholesterol (mg/dL)   Date Value   12/11/2019 126     AST (U/L)   Date Value   09/17/2019 27     ALT (U/L)   Date Value   09/17/2019 24     BUN (mg/dL)   Date Value   12/31/2019 23      (goal A1C is < 7)   (goal LDL is <100) need 30-50% reduction from baseline     BP Readings from Last 3 Encounters:   01/14/20 (!) 98/55   01/14/20 131/61   12/11/19 118/84    (goal /80)      All Future Testing planned in [facility]:  Lab Frequency Next Occurrence   Basic Metabolic Panel Once 81/59/8980   Lipid Panel Once 02/24/2020   Hepatic Function Panel Once 02/24/2020       Next Visit Date:  Future Appointments   Date Time Provider Melissa Gaitan   3/13/2020 10:00 AM JESSENIA Paiz CNP [facility]            Patient Active Problem List:     HTN (hypertension)     Arthritis     Chronic left shoulder pain     Anxiety     Bunion of great toe of right foot     History of adenomatous polyp of colon

## 2020-02-26 RX ORDER — ALPRAZOLAM 0.5 MG/1
0.5 TABLET ORAL NIGHTLY PRN
Qty: 90 TABLET | Refills: 0 | Status: SHIPPED | OUTPATIENT
Start: 2020-02-26 | End: 2020-05-19 | Stop reason: SDUPTHER

## 2020-03-03 ENCOUNTER — HOSPITAL ENCOUNTER (OUTPATIENT)
Age: 65
Discharge: HOME | End: 2020-03-03
Payer: MEDICARE

## 2020-03-03 LAB
ALBUMIN SERPL-MCNC: 4.8 G/DL (ref 3.5–5.2)
ALBUMIN/GLOBULIN RATIO: NORMAL (ref 1–2.5)
ALP BLD-CCNC: 94 U/L (ref 35–104)
ALT SERPL-CCNC: 23 U/L (ref 5–33)
AST SERPL-CCNC: 27 U/L
BILIRUB SERPL-MCNC: 0.32 MG/DL (ref 0.3–1.2)
BILIRUBIN DIRECT: <0.08 MG/DL
BILIRUBIN, INDIRECT: NORMAL MG/DL (ref 0–1)
CHOLESTEROL/HDL RATIO: 2.3
CHOLESTEROL: 175 MG/DL
GLOBULIN: NORMAL G/DL (ref 1.5–3.8)
HDLC SERPL-MCNC: 77 MG/DL
LDL CHOLESTEROL: 84 MG/DL (ref 0–130)
PATIENT FASTING?: YES
TOTAL PROTEIN: 7.9 G/DL (ref 6.4–8.3)
TRIGL SERPL-MCNC: 70 MG/DL
VLDLC SERPL CALC-MCNC: NORMAL MG/DL (ref 1–30)

## 2020-03-03 PROCEDURE — 80061 LIPID PANEL: CPT

## 2020-03-03 PROCEDURE — 80076 HEPATIC FUNCTION PANEL: CPT

## 2020-03-03 PROCEDURE — 36415 COLL VENOUS BLD VENIPUNCTURE: CPT

## 2020-03-03 RX ORDER — ROSUVASTATIN CALCIUM 20 MG/1
20 TABLET, COATED ORAL NIGHTLY
Qty: 90 TABLET | Refills: 1 | Status: SHIPPED | OUTPATIENT
Start: 2020-03-03 | End: 2020-03-13 | Stop reason: SDUPTHER

## 2020-03-13 ENCOUNTER — HOSPITAL ENCOUNTER (OUTPATIENT)
Age: 65
Setting detail: SPECIMEN
Discharge: HOME | End: 2020-03-13
Payer: MEDICARE

## 2020-03-13 ENCOUNTER — OFFICE VISIT (OUTPATIENT)
Dept: PRIMARY CARE CLINIC | Age: 65
End: 2020-03-13
Payer: MEDICARE

## 2020-03-13 VITALS
HEART RATE: 86 BPM | HEIGHT: 67 IN | SYSTOLIC BLOOD PRESSURE: 110 MMHG | WEIGHT: 192 LBS | BODY MASS INDEX: 30.13 KG/M2 | DIASTOLIC BLOOD PRESSURE: 80 MMHG | OXYGEN SATURATION: 96 %

## 2020-03-13 LAB
ANION GAP SERPL CALCULATED.3IONS-SCNC: 11 MMOL/L (ref 9–17)
BUN BLDV-MCNC: 19 MG/DL (ref 8–23)
BUN/CREAT BLD: 15 (ref 9–20)
CALCIUM SERPL-MCNC: 10.3 MG/DL (ref 8.6–10.4)
CHLORIDE BLD-SCNC: 96 MMOL/L (ref 98–107)
CO2: 26 MMOL/L (ref 20–31)
CREAT SERPL-MCNC: 1.28 MG/DL (ref 0.5–0.9)
GFR AFRICAN AMERICAN: 51 ML/MIN
GFR NON-AFRICAN AMERICAN: 42 ML/MIN
GFR SERPL CREATININE-BSD FRML MDRD: ABNORMAL ML/MIN/{1.73_M2}
GFR SERPL CREATININE-BSD FRML MDRD: ABNORMAL ML/MIN/{1.73_M2}
GLUCOSE BLD-MCNC: 88 MG/DL (ref 70–99)
POTASSIUM SERPL-SCNC: 4.3 MMOL/L (ref 3.7–5.3)
SODIUM BLD-SCNC: 133 MMOL/L (ref 135–144)

## 2020-03-13 PROCEDURE — 1123F ACP DISCUSS/DSCN MKR DOCD: CPT | Performed by: NURSE PRACTITIONER

## 2020-03-13 PROCEDURE — 1036F TOBACCO NON-USER: CPT | Performed by: NURSE PRACTITIONER

## 2020-03-13 PROCEDURE — G0438 PPPS, INITIAL VISIT: HCPCS | Performed by: NURSE PRACTITIONER

## 2020-03-13 PROCEDURE — G8482 FLU IMMUNIZE ORDER/ADMIN: HCPCS | Performed by: NURSE PRACTITIONER

## 2020-03-13 PROCEDURE — 4040F PNEUMOC VAC/ADMIN/RCVD: CPT | Performed by: NURSE PRACTITIONER

## 2020-03-13 PROCEDURE — 80048 BASIC METABOLIC PNL TOTAL CA: CPT

## 2020-03-13 PROCEDURE — G8399 PT W/DXA RESULTS DOCUMENT: HCPCS | Performed by: NURSE PRACTITIONER

## 2020-03-13 PROCEDURE — G8417 CALC BMI ABV UP PARAM F/U: HCPCS | Performed by: NURSE PRACTITIONER

## 2020-03-13 PROCEDURE — 99213 OFFICE O/P EST LOW 20 MIN: CPT | Performed by: NURSE PRACTITIONER

## 2020-03-13 PROCEDURE — 1090F PRES/ABSN URINE INCON ASSESS: CPT | Performed by: NURSE PRACTITIONER

## 2020-03-13 PROCEDURE — G8427 DOCREV CUR MEDS BY ELIG CLIN: HCPCS | Performed by: NURSE PRACTITIONER

## 2020-03-13 PROCEDURE — 3017F COLORECTAL CA SCREEN DOC REV: CPT | Performed by: NURSE PRACTITIONER

## 2020-03-13 RX ORDER — OXYBUTYNIN CHLORIDE 5 MG/1
5 TABLET, EXTENDED RELEASE ORAL DAILY
Qty: 90 TABLET | Refills: 1 | Status: SHIPPED | OUTPATIENT
Start: 2020-03-13 | End: 2020-07-21

## 2020-03-13 RX ORDER — FUROSEMIDE 20 MG/1
20 TABLET ORAL DAILY
Qty: 90 TABLET | Refills: 1 | Status: SHIPPED | OUTPATIENT
Start: 2020-03-13 | End: 2020-03-16 | Stop reason: SDUPTHER

## 2020-03-13 RX ORDER — ROSUVASTATIN CALCIUM 20 MG/1
20 TABLET, COATED ORAL NIGHTLY
Qty: 90 TABLET | Refills: 1 | Status: SHIPPED | OUTPATIENT
Start: 2020-03-13 | End: 2020-09-02 | Stop reason: SDUPTHER

## 2020-03-13 ASSESSMENT — PATIENT HEALTH QUESTIONNAIRE - PHQ9
SUM OF ALL RESPONSES TO PHQ QUESTIONS 1-9: 0
SUM OF ALL RESPONSES TO PHQ QUESTIONS 1-9: 0

## 2020-03-13 ASSESSMENT — LIFESTYLE VARIABLES
HOW MANY STANDARD DRINKS CONTAINING ALCOHOL DO YOU HAVE ON A TYPICAL DAY: 1
AUDIT-C TOTAL SCORE: 2
HOW OFTEN DO YOU HAVE SIX OR MORE DRINKS ON ONE OCCASION: 0
HOW OFTEN DO YOU HAVE A DRINK CONTAINING ALCOHOL: 1

## 2020-03-13 ASSESSMENT — ENCOUNTER SYMPTOMS
ABDOMINAL DISTENTION: 0
EYES NEGATIVE: 1
COUGH: 0
RHINORRHEA: 0

## 2020-03-13 NOTE — PROGRESS NOTES
Medicare Annual Wellness Visit  Name: Ayden Trujillo Date: 3/13/2020   MRN: [id number] Sex: Female   Age: 75 y.o. Ethnicity: Non-/Non    : 1955 Race: Terrence Tavares is here for Medicare AWV    Screenings for behavioral, psychosocial and functional/safety risks, and cognitive dysfunction are all negative except as indicated below. These results, as well as other patient data from the [address] form, are documented in Flowsheets linked to this Encounter. Allergies   Allergen Reactions    Augmentin [Amoxicillin-Pot Clavulanate]      Gave Diarhhea      Percocet [Oxycodone-Acetaminophen] Itching         Prior to Visit Medications    Medication Sig Taking? Authorizing Provider   oxybutynin (DITROPAN-XL) 5 MG extended release tablet Take 1 tablet by mouth daily Yes JESSENIA Cardoza CNP   rosuvastatin (CRESTOR) 20 MG tablet Take 1 tablet by mouth nightly Yes JESSENIA Cardoza CNP   furosemide (LASIX) 20 MG tablet Take 1 tablet by mouth daily Yes JESSENIA Cardoza CNP   ALPRAZolam Carrie Burn) 0.5 MG tablet Take 1 tablet by mouth nightly as needed for Anxiety for up to 90 days.   JESSENIA Cardoza CNP   ibuprofen (ADVIL;MOTRIN) 800 MG tablet TAKE 1 TABLET BY MOUTH  EVERY 12 HOURS AS NEEDED  FOR PAIN  JESSENIA Cardoza CNP   omeprazole (PRILOSEC) 20 MG delayed release capsule Take 1 capsule by mouth Daily  JESSENIA Cardoza CNP   lisinopril (PRINIVIL;ZESTRIL) 5 MG tablet Take 1 tablet by mouth daily  JESSENIA Cardoza CNP   Ca Phosphate-Cholecalciferol 200-200 MG-UNIT CHEW Take by mouth  Historical Provider, MD   Coenzyme Q10 (CO Q-10) 200 MG CAPS Take 1 capsule by mouth  Historical Provider, MD   Elastic Bandages & Supports ([address]) [address] 1 each by Does not apply route daily as needed (varicose veins with leg swelling) 20-30 mmHG full length  JESSENIA Cardoza CNP   amLODIPine (NORVASC) 10 MG tablet Take 1 tablet by mouth clicks, or gallops, distal pulses intact, no carotid bruits  Abdomen: soft, non-tender, non-distended, normal bowel sounds, no masses or organomegaly  Extremities: no cyanosis, clubbing or edema  Musculoskeletal: normal range of motion, no joint swelling, deformity or tenderness  Neurologic: reflexes normal and symmetric, no cranial nerve deficit, gait, coordination and speech normal    Patient's complete Health Risk Assessment and screening values have been reviewed and are found in Flowsheets. The following problems were reviewed today and where indicated follow up appointments were made and/or referrals ordered. Positive Risk Factor Screenings with Interventions:     Health Habits/Nutrition:  Health Habits/Nutrition  Do you exercise for at least 20 minutes 2-3 times per week?: Yes  Have you lost any weight without trying in the past 3 months?: No  Do you eat fewer than 2 meals per day?: No  Have you seen a dentist within the past year?: Yes  Body mass index is 30.07 kg/m². Health Habits/Nutrition Interventions:  · Inadequate physical activity:  patient agrees to exercise for at least 150 minutes/week, gym 3 days a week, up and down steps in her home. foot surgery in January and doing well still healing.    · Nutritional issues:  educational materials for healthy, well-balanced diet provided  · Dental exam overdue:  natural teeth    Safety:  Safety  Do you have working smoke detectors?: Yes  Have all throw rugs been removed or fastened?: Yes  Do you have non-slip mats or surfaces in all bathtubs/showers?: (!) No  Do all of your stairways have a railing or banister?: Yes  Are your doorways, halls and stairs free of clutter?: Yes  Do you always fasten your seatbelt when you are in a car?: Yes  Safety Interventions:  · Home safety tips provided    Personalized Preventive Plan   Current Health Maintenance Status  Immunization History   Administered Date(s) Administered    Hepatitis A Adult (Havrix, Vaqta) Heart Cath     Heart Disease Father     Heart Failure Father     Alcohol Abuse Maternal Grandfather     Diabetes Paternal Grandfather     Other Maternal Aunt         All maternal aunts had heart disease          Social History     Tobacco Use    Smoking status: Former Smoker     Packs/day: 0.50     Years: 23.00     Pack years: 11.50     Types: Cigarettes     Start date: 1994     Last attempt to quit: 2017     Years since quittin.4    Smokeless tobacco: Never Used   Substance Use Topics    Alcohol use: Yes     Alcohol/week: 5.0 standard drinks     Types: 5 Shots of liquor per week      Current Outpatient Medications   Medication Sig Dispense Refill    oxybutynin (DITROPAN-XL) 5 MG extended release tablet Take 1 tablet by mouth daily 90 tablet 1    rosuvastatin (CRESTOR) 20 MG tablet Take 1 tablet by mouth nightly 90 tablet 1    furosemide (LASIX) 20 MG tablet Take 1 tablet by mouth daily 90 tablet 1    ALPRAZolam (XANAX) 0.5 MG tablet Take 1 tablet by mouth nightly as needed for Anxiety for up to 90 days. 90 tablet 0    ibuprofen (ADVIL;MOTRIN) 800 MG tablet TAKE 1 TABLET BY MOUTH  EVERY 12 HOURS AS NEEDED  FOR PAIN 180 tablet 0    omeprazole (PRILOSEC) 20 MG delayed release capsule Take 1 capsule by mouth Daily 90 capsule 1    lisinopril (PRINIVIL;ZESTRIL) 5 MG tablet Take 1 tablet by mouth daily 90 tablet 2    Ca Phosphate-Cholecalciferol 200-200 MG-UNIT CHEW Take by mouth      Coenzyme Q10 (CO Q-10) 200 MG CAPS Take 1 capsule by mouth      Elastic Bandages & Supports (MEDICAL COMPRESSION STOCKINGS) MISC 1 each by Does not apply route daily as needed (varicose veins with leg swelling) 20-30 mmHG full length 2 each 2    amLODIPine (NORVASC) 10 MG tablet Take 1 tablet by mouth daily 90 tablet 3    NONFORMULARY Indications: Womens 1 a day 50 plus.  NONFORMULARY Indications: [address] [address] hair skin and nails  2500 mcg 1 a day.       vitamin B-12 (CYANOCOBALAMIN) 500 MCG tablet Take 500 mcg by mouth daily      Polypodium Leucotomos (HELIOCARE PO) Take 1 tablet by mouth daily      MIRVASO 0.33 % GEL       Magnesium 400 MG CAPS Take  by mouth daily.  aspirin 81 MG tablet Take 81 mg by mouth daily.  hydroxychloroquine (PLAQUENIL) 200 MG tablet Take 200 mg by mouth daily.  fish oil-omega-3 fatty acids 1000 MG capsule Take 1 g by mouth 2 times daily.  vitamin D (CHOLECALCIFEROL) 400 UNITS TABS tablet Take  by mouth daily. 2000 mg once daily      calcium-vitamin D (OSCAL) 250-125 MG-UNIT per tablet Take 1 tablet by mouth daily. Vit D and Vit K 750 mg daily      Ascorbic Acid (VITAMIN C) 500 MG tablet Take 500 mg by mouth daily. No current facility-administered medications for this visit. Allergies   Allergen Reactions    Augmentin [Amoxicillin-Pot Clavulanate]      Gave Diarhhea      Percocet [Oxycodone-Acetaminophen] Itching       Health Maintenance   Topic Date Due    Diabetes screen  09/20/1993    Annual Wellness Visit (AWV)  05/29/2019    Potassium monitoring  12/31/2020    Creatinine monitoring  12/31/2020    Lipid screen  03/03/2021    Breast cancer screen  10/21/2021    Pneumococcal 65+ years Vaccine (2 of 2 - PPSV23) 12/07/2021    Colon cancer screen colonoscopy  07/01/2024    DTaP/Tdap/Td vaccine (3 - Td) 12/10/2028    DEXA (modify frequency per FRAX score)  Completed    Flu vaccine  Completed    Shingles Vaccine  Completed    Hepatitis C screen  Completed    Hepatitis A vaccine  Aged Out    Hepatitis B vaccine  Aged Out    Hib vaccine  Aged Out    Meningococcal (ACWY) vaccine  Aged Out       Subjective:      Review of Systems   Constitutional: Negative for activity change, chills and fever. HENT: Negative for congestion and rhinorrhea. Eyes: Negative. Respiratory: Negative for cough. Cardiovascular: Negative for chest pain. Gastrointestinal: Negative for abdominal distention.    Genitourinary: Negative for difficulty urinating. Musculoskeletal: Negative for arthralgias. Neurological: Negative for dizziness and headaches. Objective:     Physical Exam  Vitals signs and nursing note reviewed. Constitutional:       General: She is not in acute distress. Appearance: Normal appearance. She is well-developed. HENT:      Head: Normocephalic and atraumatic. Right Ear: Tympanic membrane and external ear normal.      Left Ear: External ear normal.      Ears:      Comments: Left TM with cerumen in canal removed with cerumen stick moderate amount cerumen, immediate return of hearing. Eyes:      General: No scleral icterus. Pupils: Pupils are equal, round, and reactive to light. Neck:      Musculoskeletal: Normal range of motion and neck supple. Cardiovascular:      Rate and Rhythm: Normal rate and regular rhythm. Heart sounds: Normal heart sounds. No murmur. Pulmonary:      Effort: Pulmonary effort is normal. No respiratory distress. Breath sounds: Normal breath sounds. No wheezing. Abdominal:      Palpations: Abdomen is soft. Tenderness: There is no abdominal tenderness. Musculoskeletal: Normal range of motion. Right lower leg: Edema present. Left lower leg: Edema present. Comments: Varicosities both legs luz elena   Lymphadenopathy:      Cervical: No cervical adenopathy. Skin:     General: Skin is warm and dry. Neurological:      Mental Status: She is alert and oriented to person, place, and time. Psychiatric:         Behavior: Behavior normal.         Thought Content:  Thought content normal.         Judgment: Judgment normal.       /80 (Site: Right Upper Arm, Position: Sitting, Cuff Size: Medium Adult)   Pulse 86   Ht 5' 7\" (1.702 m)   Wt 192 lb (87.1 kg)   LMP 05/20/2012   SpO2 96%   BMI 30.07 kg/m²     Data:     Lab Results   Component Value Date     12/31/2019    K 4.0 12/31/2019     12/31/2019    CO2 24 12/31/2019    BUN 23 12/31/2019    CREATININE 0.92 12/31/2019    GLUCOSE 105 12/31/2019    PROT 7.9 03/03/2020    LABALBU 4.8 03/03/2020    LABALBU 4.3 10/19/2011    BILITOT 0.32 03/03/2020    ALKPHOS 94 03/03/2020    AST 27 03/03/2020    ALT 23 03/03/2020     Lab Results   Component Value Date    WBC 7.8 12/31/2019    RBC 4.30 12/31/2019    RBC 4.54 10/19/2011    HGB 13.4 12/31/2019    HCT 39.5 12/31/2019    MCV 91.8 12/31/2019    MCH 31.1 12/31/2019    MCHC 33.9 12/31/2019    RDW 13.9 12/31/2019     12/31/2019     10/19/2011    MPV NOT REPORTED 12/31/2019     Lab Results   Component Value Date    TSH 2.28 09/17/2019     Lab Results   Component Value Date    CHOL 175 03/03/2020    HDL 77 03/03/2020          Assessment & Plan       1. Hearing loss of left ear due to cerumen impaction  Removed cerumen from left ear with cerumen stick pt tolerated well hearing returned to normal    2. Encounter for monitoring diuretic therapy  On lasix x 6 weeks with no potassium supplement. Wearing support hose left leg has decreased in size with diuretic but concerned with Potassium monitoring. Pt is on lisinopril  Lab drawn today. Denies leg cramps. - Basic Metabolic Panel; Future    3. Varicose veins of leg with swelling, bilateral  Chronic but newly on diuretic lasix 20 mg daily from vascular. 4. Essential hypertension  stable                  Completed Refills   Requested Prescriptions     Signed Prescriptions Disp Refills    oxybutynin (DITROPAN-XL) 5 MG extended release tablet 90 tablet 1     Sig: Take 1 tablet by mouth daily    rosuvastatin (CRESTOR) 20 MG tablet 90 tablet 1     Sig: Take 1 tablet by mouth nightly    furosemide (LASIX) 20 MG tablet 90 tablet 1     Sig: Take 1 tablet by mouth daily     Return for Medicare Annual Wellness Visit in 1 year.   Orders Placed This Encounter   Medications    oxybutynin (DITROPAN-XL) 5 MG extended release tablet     Sig: Take 1 tablet by mouth daily     Dispense:  90 tablet Refill:  1    rosuvastatin (CRESTOR) 20 MG tablet     Sig: Take 1 tablet by mouth nightly     Dispense:  90 tablet     Refill:  1    furosemide (LASIX) 20 MG tablet     Sig: Take 1 tablet by mouth daily     Dispense:  90 tablet     Refill:  1     Orders Placed This Encounter   Procedures    Basic Metabolic Panel     Standing Status:   Future     Standing Expiration Date:   3/13/2021         [place] received counseling on the following healthy behaviors: nutrition, exercise and medication adherence  Reviewed prior labs and health maintenance. Continue current medications, diet and exercise. Discussed use, benefit, and side effects of prescribed medications. Barriers to medication compliance addressed. Patient given educational materials - see patient instructions. All patient questions answered. Patient voiced understanding.           Electronically signed by JESSENIA Doherty CNP on 3/13/2020 at 10:26 AM

## 2020-03-16 RX ORDER — FUROSEMIDE 20 MG/1
20 TABLET ORAL
Qty: 36 TABLET | Refills: 1
Start: 2020-03-16 | End: 2020-07-21

## 2020-05-19 RX ORDER — ALPRAZOLAM 0.5 MG/1
0.5 TABLET ORAL NIGHTLY PRN
Qty: 90 TABLET | Refills: 0 | Status: SHIPPED | OUTPATIENT
Start: 2020-05-19 | End: 2020-07-22

## 2020-05-20 ENCOUNTER — HOSPITAL ENCOUNTER (OUTPATIENT)
Age: 65
Discharge: HOME | End: 2020-05-20
Payer: MEDICARE

## 2020-05-20 LAB
ALBUMIN SERPL-MCNC: 4.3 G/DL (ref 3.5–5.2)
ALBUMIN/GLOBULIN RATIO: ABNORMAL (ref 1–2.5)
ALP BLD-CCNC: 90 U/L (ref 35–104)
ALT SERPL-CCNC: 17 U/L (ref 5–33)
ANION GAP SERPL CALCULATED.3IONS-SCNC: 10 MMOL/L (ref 9–17)
AST SERPL-CCNC: 25 U/L
BILIRUB SERPL-MCNC: 0.26 MG/DL (ref 0.3–1.2)
BILIRUBIN DIRECT: <0.08 MG/DL
BILIRUBIN, INDIRECT: ABNORMAL MG/DL (ref 0–1)
BUN BLDV-MCNC: 24 MG/DL (ref 8–23)
BUN/CREAT BLD: 30 (ref 9–20)
CALCIUM SERPL-MCNC: 9.9 MG/DL (ref 8.6–10.4)
CHLORIDE BLD-SCNC: 106 MMOL/L (ref 98–107)
CHOLESTEROL/HDL RATIO: 2.3
CHOLESTEROL: 157 MG/DL
CO2: 25 MMOL/L (ref 20–31)
CREAT SERPL-MCNC: 0.81 MG/DL (ref 0.5–0.9)
GFR AFRICAN AMERICAN: >60 ML/MIN
GFR NON-AFRICAN AMERICAN: >60 ML/MIN
GFR SERPL CREATININE-BSD FRML MDRD: ABNORMAL ML/MIN/{1.73_M2}
GFR SERPL CREATININE-BSD FRML MDRD: ABNORMAL ML/MIN/{1.73_M2}
GLOBULIN: ABNORMAL G/DL (ref 1.5–3.8)
GLUCOSE BLD-MCNC: 121 MG/DL (ref 70–99)
HDLC SERPL-MCNC: 69 MG/DL
LDL CHOLESTEROL: 72 MG/DL (ref 0–130)
PATIENT FASTING?: YES
POTASSIUM SERPL-SCNC: 4 MMOL/L (ref 3.7–5.3)
SODIUM BLD-SCNC: 141 MMOL/L (ref 135–144)
TOTAL PROTEIN: 7.5 G/DL (ref 6.4–8.3)
TRIGL SERPL-MCNC: 79 MG/DL
VLDLC SERPL CALC-MCNC: NORMAL MG/DL (ref 1–30)

## 2020-05-20 PROCEDURE — 80061 LIPID PANEL: CPT

## 2020-05-20 PROCEDURE — 80048 BASIC METABOLIC PNL TOTAL CA: CPT

## 2020-05-20 PROCEDURE — 80076 HEPATIC FUNCTION PANEL: CPT

## 2020-05-20 PROCEDURE — 36415 COLL VENOUS BLD VENIPUNCTURE: CPT

## 2020-05-20 RX ORDER — OMEPRAZOLE 20 MG/1
20 CAPSULE, DELAYED RELEASE ORAL DAILY
Qty: 90 CAPSULE | Refills: 1 | Status: SHIPPED | OUTPATIENT
Start: 2020-05-20 | End: 2021-01-29

## 2020-05-20 NOTE — TELEPHONE ENCOUNTER
Last OV: 3/13/2020  Last RX: 01/17/20   Next scheduled apt: 9/16/2020    Medication pending.       Health Maintenance   Topic Date Due    Lipid screen  03/03/2021    Potassium monitoring  03/13/2021    Creatinine monitoring  03/13/2021    Annual Wellness Visit (AWV)  03/14/2021    Breast cancer screen  10/21/2021    Pneumococcal 65+ years Vaccine (2 of 2 - PPSV23) 12/07/2021    Colon cancer screen colonoscopy  07/01/2024    DTaP/Tdap/Td vaccine (3 - Td) 12/10/2028    DEXA (modify frequency per FRAX score)  Completed    Flu vaccine  Completed    Shingles Vaccine  Completed    Hepatitis C screen  Completed    Hepatitis A vaccine  Aged Out    Hepatitis B vaccine  Aged Out    Hib vaccine  Aged Out    Meningococcal (ACWY) vaccine  Aged Out             (applicable per patient's age: Cancer Screenings, Depression Screening, Fall Risk Screening, Immunizations)    LDL Cholesterol (mg/dL)   Date Value   05/20/2020 72     AST (U/L)   Date Value   05/20/2020 25     ALT (U/L)   Date Value   05/20/2020 17     BUN (mg/dL)   Date Value   05/20/2020 24 (H)      (goal A1C is < 7)   (goal LDL is <100) need 30-50% reduction from baseline     BP Readings from Last 3 Encounters:   03/13/20 110/80   01/14/20 (!) 98/55   01/14/20 131/61    (goal /80)      All Future Testing planned in [facility]:  Lab Frequency Next Occurrence       Next Visit Date:  Future Appointments   Date Time Provider Melissa Gaitan   9/16/2020 10:00 AM JESSENIA Hdz CNP [facility]            Patient Active Problem List:     HTN (hypertension)     Arthritis     Chronic left shoulder pain     Anxiety     Bunion of great toe of right foot     History of adenomatous polyp of colon

## 2020-06-19 ENCOUNTER — TELEPHONE (OUTPATIENT)
Dept: PRIMARY CARE CLINIC | Age: 65
End: 2020-06-19

## 2020-06-19 ENCOUNTER — HOSPITAL ENCOUNTER (OUTPATIENT)
Age: 65
Discharge: HOME | End: 2020-06-19
Payer: MEDICARE

## 2020-06-19 LAB
-: NORMAL
AMORPHOUS: NORMAL
BACTERIA: NORMAL
BILIRUBIN URINE: NEGATIVE
CASTS UA: NORMAL /LPF
COLOR: YELLOW
COMMENT UA: ABNORMAL
CRYSTALS, UA: NORMAL /HPF
EPITHELIAL CELLS UA: NORMAL /HPF
GLUCOSE URINE: NEGATIVE
KETONES, URINE: NEGATIVE
LEUKOCYTE ESTERASE, URINE: NEGATIVE
MUCUS: NORMAL
NITRITE, URINE: NEGATIVE
OTHER OBSERVATIONS UA: NORMAL
PH UA: 5 (ref 5–8)
PROTEIN UA: NEGATIVE
RBC UA: NORMAL /HPF (ref 0–2)
RENAL EPITHELIAL, UA: NORMAL /HPF
SPECIFIC GRAVITY UA: 1.01 (ref 1–1.03)
TRICHOMONAS: NORMAL
TURBIDITY: CLEAR
URINE HGB: ABNORMAL
UROBILINOGEN, URINE: NORMAL
WBC UA: NORMAL /HPF
YEAST: NORMAL

## 2020-06-19 PROCEDURE — 81001 URINALYSIS AUTO W/SCOPE: CPT

## 2020-07-16 NOTE — H&P
Historical Provider, MD       Allergies:Augmentin [amoxicillin-pot clavulanate] and Percocet [oxycodone-acetaminophen]       Social History:    reports that she quit smoking about 2 years ago. Her smoking use included cigarettes. She started smoking about 25 years ago. She has a 11.50 pack-year smoking history. She has never used smokeless tobacco. She reports current alcohol use of about 5.0 standard drinks of alcohol per week. She reports previous drug use. Family History:   Family History   Problem Relation Age of Onset    Heart Disease Mother     High Cholesterol Mother     High Blood Pressure Mother     Heart Surgery Mother     Other Mother         Heart Cath     Heart Disease Father     Heart Failure Father     Alcohol Abuse Maternal Grandfather     Diabetes Paternal Grandfather     Other Maternal Aunt         All maternal aunts had heart disease       REVIEW OF SYSTEMS:    CONSTITUTIONAL:  negative for  fevers and chills  CARDIOVASCULAR:  negative for  chest pain, dyspnea    PHYSICAL EXAM:    LMP 05/20/2012   CONSTITUTIONAL:  awake, alert, cooperative, no apparent distress, and appears stated age  CARDIOVASCULAR:  regular rate and rhythm  EXTREMITY: palp pulses and semireducible HT 1,3 right. POP dorsal joint and MPJ   XRAY : digital deformity      ASSESSMENT AND PLAN: Right 1,3 hammertoe. Hammertoe correction 3 and Ext tendon lengthening 1 right    Patient Active Hospital Problem List:  No active hospital problems. Methotrexate Counseling:  Patient counseled regarding adverse effects of methotrexate including but not limited to nausea, vomiting, abnormalities in liver function tests. Patients may develop mouth sores, rash, diarrhea, and abnormalities in blood counts. The patient understands that monitoring is required including LFT's and blood counts.  There is a rare possibility of scarring of the liver and lung problems that can occur when taking methotrexate. Persistent nausea, loss of appetite, pale stools, dark urine, cough, and shortness of breath should be reported immediately. Patient advised to discontinue methotrexate treatment at least three months before attempting to become pregnant.  I discussed the need for folate supplements while taking methotrexate.  These supplements can decrease side effects during methotrexate treatment. The patient verbalized understanding of the proper use and possible adverse effects of methotrexate.  All of the patient's questions and concerns were addressed.

## 2020-07-21 RX ORDER — FUROSEMIDE 20 MG/1
20 TABLET ORAL DAILY
Qty: 90 TABLET | Refills: 1 | Status: SHIPPED | OUTPATIENT
Start: 2020-07-21 | End: 2020-11-20

## 2020-07-21 RX ORDER — IBUPROFEN 800 MG/1
800 TABLET ORAL EVERY 12 HOURS PRN
Qty: 180 TABLET | Refills: 0 | Status: ON HOLD | OUTPATIENT
Start: 2020-07-21 | End: 2020-10-29 | Stop reason: HOSPADM

## 2020-07-21 RX ORDER — OXYBUTYNIN CHLORIDE 5 MG/1
5 TABLET, EXTENDED RELEASE ORAL DAILY
Qty: 90 TABLET | Refills: 1 | Status: SHIPPED | OUTPATIENT
Start: 2020-07-21 | End: 2020-12-04 | Stop reason: SDUPTHER

## 2020-07-21 NOTE — TELEPHONE ENCOUNTER
Last OV: 3/13/2020  Last RX: 03/16/2020   Next scheduled apt: 9/16/2020    Medication pending.     Health Maintenance   Topic Date Due    Diabetes screen  09/20/1993    Flu vaccine (1) 09/01/2020    Annual Wellness Visit (AWV)  03/14/2021    Lipid screen  05/20/2021    Potassium monitoring  05/20/2021    Creatinine monitoring  05/20/2021    Breast cancer screen  10/21/2021    Pneumococcal 65+ years Vaccine (2 of 2 - PPSV23) 12/07/2021    Colon cancer screen colonoscopy  07/01/2024    DTaP/Tdap/Td vaccine (3 - Td) 12/10/2028    DEXA (modify frequency per FRAX score)  Completed    Shingles Vaccine  Completed    Hepatitis C screen  Completed    Hepatitis A vaccine  Aged Out    Hepatitis B vaccine  Aged Out    Hib vaccine  Aged Out    Meningococcal (ACWY) vaccine  Aged Out             (applicable per patient's age: Cancer Screenings, Depression Screening, Fall Risk Screening, Immunizations)    LDL Cholesterol (mg/dL)   Date Value   05/20/2020 72     AST (U/L)   Date Value   05/20/2020 25     ALT (U/L)   Date Value   05/20/2020 17     BUN (mg/dL)   Date Value   05/20/2020 24 (H)      (goal A1C is < 7)   (goal LDL is <100) need 30-50% reduction from baseline     BP Readings from Last 3 Encounters:   03/13/20 110/80   01/14/20 (!) 98/55   01/14/20 131/61    (goal /80)      All Future Testing planned in [facility]:  Lab Frequency Next Occurrence       Next Visit Date:  Future Appointments   Date Time Provider Melissa Gaitan   9/16/2020 10:00 AM JESSENIA Upton CNP [facility]            Patient Active Problem List:     HTN (hypertension)     Arthritis     Chronic left shoulder pain     Anxiety     Bunion of great toe of right foot     History of adenomatous polyp of colon

## 2020-07-22 RX ORDER — ALPRAZOLAM 0.5 MG/1
0.5 TABLET ORAL NIGHTLY PRN
Qty: 90 TABLET | Refills: 0 | Status: SHIPPED | OUTPATIENT
Start: 2020-07-22 | End: 2020-07-24 | Stop reason: SDUPTHER

## 2020-07-22 RX ORDER — AMLODIPINE BESYLATE 10 MG/1
10 TABLET ORAL DAILY
Qty: 90 TABLET | Refills: 3 | Status: SHIPPED | OUTPATIENT
Start: 2020-07-22 | End: 2021-03-31 | Stop reason: SDUPTHER

## 2020-07-22 RX ORDER — LISINOPRIL 5 MG/1
5 TABLET ORAL DAILY
Qty: 90 TABLET | Refills: 2 | Status: SHIPPED | OUTPATIENT
Start: 2020-07-22 | End: 2020-12-04 | Stop reason: SDUPTHER

## 2020-07-24 RX ORDER — ALPRAZOLAM 0.5 MG/1
0.5 TABLET ORAL NIGHTLY PRN
Qty: 90 TABLET | Refills: 0 | Status: SHIPPED | OUTPATIENT
Start: 2020-07-24 | End: 2020-10-22

## 2020-09-02 ENCOUNTER — OFFICE VISIT (OUTPATIENT)
Dept: PRIMARY CARE CLINIC | Age: 65
End: 2020-09-02
Payer: MEDICARE

## 2020-09-02 VITALS
HEART RATE: 63 BPM | WEIGHT: 175 LBS | DIASTOLIC BLOOD PRESSURE: 86 MMHG | SYSTOLIC BLOOD PRESSURE: 120 MMHG | BODY MASS INDEX: 27.41 KG/M2 | OXYGEN SATURATION: 96 % | TEMPERATURE: 97.7 F

## 2020-09-02 PROCEDURE — G8427 DOCREV CUR MEDS BY ELIG CLIN: HCPCS | Performed by: NURSE PRACTITIONER

## 2020-09-02 PROCEDURE — 4040F PNEUMOC VAC/ADMIN/RCVD: CPT | Performed by: NURSE PRACTITIONER

## 2020-09-02 PROCEDURE — 3017F COLORECTAL CA SCREEN DOC REV: CPT | Performed by: NURSE PRACTITIONER

## 2020-09-02 PROCEDURE — 1090F PRES/ABSN URINE INCON ASSESS: CPT | Performed by: NURSE PRACTITIONER

## 2020-09-02 PROCEDURE — 99214 OFFICE O/P EST MOD 30 MIN: CPT | Performed by: NURSE PRACTITIONER

## 2020-09-02 PROCEDURE — 1123F ACP DISCUSS/DSCN MKR DOCD: CPT | Performed by: NURSE PRACTITIONER

## 2020-09-02 PROCEDURE — G8417 CALC BMI ABV UP PARAM F/U: HCPCS | Performed by: NURSE PRACTITIONER

## 2020-09-02 PROCEDURE — 1036F TOBACCO NON-USER: CPT | Performed by: NURSE PRACTITIONER

## 2020-09-02 PROCEDURE — G8399 PT W/DXA RESULTS DOCUMENT: HCPCS | Performed by: NURSE PRACTITIONER

## 2020-09-02 RX ORDER — ROSUVASTATIN CALCIUM 20 MG/1
20 TABLET, COATED ORAL NIGHTLY
Qty: 90 TABLET | Refills: 1 | Status: SHIPPED | OUTPATIENT
Start: 2020-09-02 | End: 2020-12-04 | Stop reason: SDUPTHER

## 2020-09-02 NOTE — PROGRESS NOTES
melanoma on chest    Discoid lupus     Hay fever     Hormone disorder     Hypertension     Lipoma 2009    Rosacea       Past Surgical History:   Procedure Laterality Date    BACK SURGERY      LSharmila, bethany @ [address]      COLONOSCOPY      COLONOSCOPY  2016    Dr. Tomy Dozier:  1 adenomatous polyp    COLONOSCOPY N/A 2019    Dr. Tomy Dozier:  Hyperplastic polyps    FOOT SURGERY      HAMMER TOE SURGERY Right 14    2nd toe, bunion surgery great right toe    HAMMER TOE SURGERY Right 2020    RIGHT 3rd TOE HAMMER REPAIR W/  Right Great Toe EHL LEGNTHENING performed [facility] H[facility]keon D[facility] OR    HAND TENDON SURGERY Bilateral     HYSTERECTOMY      KNEE ARTHROSCOPY Bilateral     LIPOMA RESECTION  2009    SKIN BIOPSY      rt chest       Family History   Problem Relation Age of Onset    Heart Disease Mother     High Cholesterol Mother     High Blood Pressure Mother     Heart Surgery Mother     Other Mother         Heart Cath     Heart Disease Father     Heart Failure Father     Alcohol Abuse Maternal Grandfather     Diabetes Paternal Grandfather     Other Maternal Aunt         All maternal aunts had heart disease          Social History     Tobacco Use    Smoking status: Former Smoker     Packs/day: 0.50     Years: 23.00     Pack years: 11.50     Types: Cigarettes     Start date: 1994     Last attempt to quit: 2017     Years since quittin.9    Smokeless tobacco: Never Used   Substance Use Topics    Alcohol use: Yes     Alcohol/week: 5.0 standard drinks     Types: 5 Shots of liquor per week      Current Outpatient Medications   Medication Sig Dispense Refill    rosuvastatin (CRESTOR) 20 MG tablet Take 1 tablet by mouth nightly 90 tablet 1    ALPRAZolam (XANAX) 0.5 MG tablet Take 1 tablet by mouth nightly as needed for Anxiety for up to 90 days.  90 tablet 0    amLODIPine (NORVASC) 10 MG tablet TAKE 1 TABLET BY MOUTH  DAILY 90 tablet 3    09/20/1993    Flu vaccine (1) 09/01/2020    Annual Wellness Visit (AWV)  03/14/2021    Lipid screen  05/20/2021    Potassium monitoring  05/20/2021    Creatinine monitoring  05/20/2021    Breast cancer screen  10/21/2021    Pneumococcal 65+ years Vaccine (2 of 2 - PPSV23) 12/07/2021    Colon cancer screen colonoscopy  07/01/2024    DTaP/Tdap/Td vaccine (3 - Td) 12/10/2028    DEXA (modify frequency per FRAX score)  Completed    Shingles Vaccine  Completed    Hepatitis C screen  Completed    Hepatitis A vaccine  Aged Out    Hepatitis B vaccine  Aged Out    Hib vaccine  Aged Out    Meningococcal (ACWY) vaccine  Aged Out       Subjective:      Review of Systems   Constitutional: Positive for activity change. Negative for appetite change, chills and fever. HENT: Negative for congestion, rhinorrhea and sore throat. Eyes: Negative. Respiratory: Negative for cough and shortness of breath. Cardiovascular: Negative for chest pain and leg swelling. Gastrointestinal: Negative for abdominal distention. Endocrine: Negative for cold intolerance and heat intolerance. Genitourinary: Negative for difficulty urinating. Musculoskeletal: Positive for arthralgias and joint swelling (left knee ). Neurological: Negative for dizziness and headaches. Psychiatric/Behavioral: Negative for agitation. Objective:     Physical Exam  Vitals signs and nursing note reviewed. Constitutional:       General: She is not in acute distress. Appearance: She is well-developed. HENT:      Head: Normocephalic and atraumatic. Right Ear: External ear normal.      Left Ear: External ear normal.   Eyes:      General: No scleral icterus. Pupils: Pupils are equal, round, and reactive to light. Neck:      Musculoskeletal: Normal range of motion and neck supple. Cardiovascular:      Rate and Rhythm: Normal rate and regular rhythm. Heart sounds: Normal heart sounds. No murmur.    Pulmonary: Effort: Pulmonary effort is normal. No respiratory distress. Breath sounds: Normal breath sounds. No wheezing. Abdominal:      Palpations: Abdomen is soft. Tenderness: There is no abdominal tenderness. Musculoskeletal: Normal range of motion. General: Swelling (left knee, posterior drawer test positive for laxity. ) and tenderness present. Comments: No warmth or erythema to L knee. Full ROM. Patella click with latanya test.      Lymphadenopathy:      Cervical: No cervical adenopathy. Skin:     General: Skin is warm and dry. Neurological:      Mental Status: She is alert and oriented to person, place, and time. Psychiatric:         Behavior: Behavior normal.         Thought Content: Thought content normal.         Judgment: Judgment normal.       /86 (Site: Left Upper Arm, Position: Sitting, Cuff Size: Medium Adult)   Pulse 63   Temp 97.7 °F (36.5 °C) (Infrared)   Wt 175 lb (79.4 kg)   LMP 05/20/2012   SpO2 96%   BMI 27.41 kg/m²     Data:     Lab Results   Component Value Date     05/20/2020    K 4.0 05/20/2020     05/20/2020    CO2 25 05/20/2020    BUN 24 05/20/2020    CREATININE 0.81 05/20/2020    GLUCOSE 121 05/20/2020    PROT 7.5 05/20/2020    LABALBU 4.3 05/20/2020    LABALBU 4.3 10/19/2011    BILITOT 0.26 05/20/2020    ALKPHOS 90 05/20/2020    AST 25 05/20/2020    ALT 17 05/20/2020     Lab Results   Component Value Date    WBC 7.8 12/31/2019    RBC 4.30 12/31/2019    RBC 4.54 10/19/2011    HGB 13.4 12/31/2019    HCT 39.5 12/31/2019    MCV 91.8 12/31/2019    MCH 31.1 12/31/2019    MCHC 33.9 12/31/2019    RDW 13.9 12/31/2019     12/31/2019     10/19/2011    MPV NOT REPORTED 12/31/2019     Lab Results   Component Value Date    TSH 2.28 09/17/2019     Lab Results   Component Value Date    CHOL 157 05/20/2020    HDL 69 05/20/2020          Assessment & Plan       1.  Acute internal derangement of left knee  Concerning with laxity on exam. Needs further eval  Exercises often. - External Referral To Orthopedic Surgery    2. Chronic pain of left knee  Needs further eval.   - External Referral To Orthopedic Surgery    3. Anxiety  On xanax daily     4. Essential hypertension  Stable and controlled  Wt loss may need to lower meds     5. Hx of discoid lupus erythematosus  On plaquenil through rheumatology        10/27/2020 chart review and preadmission labs, EKG and CXR reviewed. Pt with great activity tolerance able to exercise 1 1/2 hours at the gym. No chest pain no shortness of breath. Chronic conditions listed above with good stability and control. Pt is cleared for upcoming surgery of left total knee arthroplasty at [facility] on 10/28/2020 by Dr. Shahriar Slater. BMP  Glucose 102  BUN 22  Creatinine 0.93  Electrolyte normal   K 4.1  Na 140  Cl 105  GFR > 60  Urinalysis negative for infection, normal  CBC - hgb 12.4, Hct 37.0, Plt 206     EKG sinus bradycardia rate 54,    CXR normal       __________________________ signature     Date 10/27/2020 9:15 am.   Alfredo RUELAS-CNP          Completed Refills   Requested Prescriptions     Signed Prescriptions Disp Refills    rosuvastatin (CRESTOR) 20 MG tablet 90 tablet 1     Sig: Take 1 tablet by mouth nightly     Return in about 3 months (around 12/2/2020) for hypercholesterolemia, HTN check.   Orders Placed This Encounter   Medications    rosuvastatin (CRESTOR) 20 MG tablet     Sig: Take 1 tablet by mouth nightly     Dispense:  90 tablet     Refill:  1     Orders Placed This Encounter   Procedures    External Referral To Orthopedic Surgery     Referral Priority:   Routine     Referral Type:   Eval and Treat     Referral Reason:   Specialty Services Required     Referred to Provider:   Fanta Sanford DO     Requested Specialty:   Orthopedic Surgery     Number of Visits Requested:   1         Darshan Crockett received counseling on the following healthy behaviors: nutrition, exercise and medication adherence  Reviewed prior labs and health maintenance. Continue current medications, diet and exercise. Discussed use, benefit, and side effects of prescribed medications. Barriers to medication compliance addressed. Patient given educational materials - see patient instructions. All patient questions answered. Patient voiced understanding.           Electronically signed by JESSENIA Kelly CNP on 9/4/2020 at 2:23 PM

## 2020-09-04 ASSESSMENT — ENCOUNTER SYMPTOMS
EYES NEGATIVE: 1
SHORTNESS OF BREATH: 0
SORE THROAT: 0
ABDOMINAL DISTENTION: 0
COUGH: 0
RHINORRHEA: 0

## 2020-10-14 ENCOUNTER — HOSPITAL ENCOUNTER (OUTPATIENT)
Dept: GENERAL RADIOLOGY | Age: 65
Discharge: HOME | End: 2020-10-16
Payer: MEDICARE

## 2020-10-14 ENCOUNTER — HOSPITAL ENCOUNTER (OUTPATIENT)
Age: 65
Discharge: HOME | End: 2020-10-16
Payer: MEDICARE

## 2020-10-14 PROCEDURE — 73564 X-RAY EXAM KNEE 4 OR MORE: CPT

## 2020-10-19 ENCOUNTER — HOSPITAL ENCOUNTER (OUTPATIENT)
Dept: PREADMISSION TESTING | Age: 65
Discharge: HOME | End: 2020-10-19
Payer: MEDICARE

## 2020-10-19 ENCOUNTER — HOSPITAL ENCOUNTER (OUTPATIENT)
Age: 65
Discharge: HOME | End: 2020-10-21
Payer: MEDICARE

## 2020-10-19 ENCOUNTER — HOSPITAL ENCOUNTER (OUTPATIENT)
Age: 65
Discharge: HOME | End: 2020-10-19
Payer: MEDICARE

## 2020-10-19 ENCOUNTER — HOSPITAL ENCOUNTER (OUTPATIENT)
Dept: GENERAL RADIOLOGY | Age: 65
Discharge: HOME | End: 2020-10-21
Payer: MEDICARE

## 2020-10-19 LAB
ANION GAP SERPL CALCULATED.3IONS-SCNC: 9 MMOL/L (ref 9–17)
BILIRUBIN URINE: NEGATIVE
BUN BLDV-MCNC: 22 MG/DL (ref 8–23)
BUN/CREAT BLD: 24 (ref 9–20)
CALCIUM SERPL-MCNC: 10.1 MG/DL (ref 8.6–10.4)
CHLORIDE BLD-SCNC: 105 MMOL/L (ref 98–107)
CO2: 26 MMOL/L (ref 20–31)
COLOR: YELLOW
COMMENT UA: ABNORMAL
CREAT SERPL-MCNC: 0.93 MG/DL (ref 0.5–0.9)
GFR AFRICAN AMERICAN: >60 ML/MIN
GFR NON-AFRICAN AMERICAN: >60 ML/MIN
GFR SERPL CREATININE-BSD FRML MDRD: ABNORMAL ML/MIN/{1.73_M2}
GFR SERPL CREATININE-BSD FRML MDRD: ABNORMAL ML/MIN/{1.73_M2}
GLUCOSE BLD-MCNC: 102 MG/DL (ref 70–99)
GLUCOSE URINE: NEGATIVE
HCT VFR BLD CALC: 37 % (ref 36–46)
HEMOGLOBIN: 12.4 G/DL (ref 12–16)
KETONES, URINE: NEGATIVE
LEUKOCYTE ESTERASE, URINE: NEGATIVE
MCH RBC QN AUTO: 30.9 PG (ref 26–34)
MCHC RBC AUTO-ENTMCNC: 33.5 G/DL (ref 31–37)
MCV RBC AUTO: 92.2 FL (ref 80–100)
NITRITE, URINE: NEGATIVE
NRBC AUTOMATED: NORMAL PER 100 WBC
PDW BLD-RTO: 15 % (ref 12.1–15.2)
PH UA: 6.5 (ref 5–8)
PLATELET # BLD: 206 K/UL (ref 140–450)
PMV BLD AUTO: NORMAL FL (ref 6–12)
POTASSIUM SERPL-SCNC: 4.1 MMOL/L (ref 3.7–5.3)
PROTEIN UA: ABNORMAL
RBC # BLD: 4.02 M/UL (ref 4–5.2)
SODIUM BLD-SCNC: 140 MMOL/L (ref 135–144)
SPECIFIC GRAVITY UA: 1.01 (ref 1–1.03)
TURBIDITY: CLEAR
URINE HGB: NEGATIVE
UROBILINOGEN, URINE: NORMAL
WBC # BLD: 6.1 K/UL (ref 3.5–11)

## 2020-10-19 PROCEDURE — 97530 THERAPEUTIC ACTIVITIES: CPT

## 2020-10-19 PROCEDURE — 85027 COMPLETE CBC AUTOMATED: CPT

## 2020-10-19 PROCEDURE — 71046 X-RAY EXAM CHEST 2 VIEWS: CPT

## 2020-10-19 PROCEDURE — 93005 ELECTROCARDIOGRAM TRACING: CPT | Performed by: ORTHOPAEDIC SURGERY

## 2020-10-19 PROCEDURE — 80048 BASIC METABOLIC PNL TOTAL CA: CPT

## 2020-10-19 PROCEDURE — 81003 URINALYSIS AUTO W/O SCOPE: CPT

## 2020-10-19 PROCEDURE — 36415 COLL VENOUS BLD VENIPUNCTURE: CPT

## 2020-10-19 NOTE — PRE-PROCEDURE INSTRUCTIONS
Phone: [phone]        Fax: [phone]  Physical Therapy  Pre-Op Visit/Discharge Summary  Date: 10/19/2020  Patient Name: Yola Romo        : 1955  (77 y.o.)  Attending Physician: Dr. Bret Mckeon  Diagnosis: L total knee arthoplasty  Reason for Referral:  Pre-operative Education and Gait Training  Objective Assessment:    X  Strength of uninvolved extremities are all within functional limits   ? Other:    Weight Bearing Status:  ?Right Extremity  X Left Extremity  ? Non-weight bearing  ? Partial weight bearing       ? Toe touch weight bearing X Weight bearing as tolerated    Treatment:   X  Assess pt for proper walker fit with regard to walker height       X  Instructed on gait training at level ground      X  Pt instructed on Post-op Exercises of Quad Sets, Heelslides, Glut Sets and Ankle Pumps for ROM, strength and Circulatory benefits to be completed one time per hour   X   Pt Educated on Post-Op Physical Therapy        Treatment Goals:  X  Met ? Not Met 10/19/2020   X Patient will be independent with Post Op Exercises  X  Pt will be amb with appropriate AD on level surface independently prior to surgical procedure   X  Pt will understand the focus/purpose of Post-Op Physical Therapy       Treatment Plan:   X  Gait training, as noted above    X  Exercise education, as stated above     Rehab Potential: ?  Poor   ? Fair  X Good   ? Excellent     If there are any questions regarding the plan of care, please do not hesitate to contact the center. Thank you for your referral.      Therapists Signature:  Ya Sellers PTA                             Date: 10/19/2020  Time In: 0947  Time YAP::5631      To be completed by the referring physicians   ? Approve the treatment plan as indicated   ?   Comments:   Physicians Signature:                        Date: 10/19/2020

## 2020-10-20 LAB
EKG ATRIAL RATE: 54 BPM
EKG P AXIS: 51 DEGREES
EKG P-R INTERVAL: 176 MS
EKG Q-T INTERVAL: 446 MS
EKG QRS DURATION: 86 MS
EKG QTC CALCULATION (BAZETT): 422 MS
EKG R AXIS: 36 DEGREES
EKG T AXIS: 56 DEGREES
EKG VENTRICULAR RATE: 54 BPM

## 2020-10-20 PROCEDURE — 93010 ELECTROCARDIOGRAM REPORT: CPT | Performed by: INTERNAL MEDICINE

## 2020-10-27 ENCOUNTER — ANESTHESIA EVENT (OUTPATIENT)
Dept: OPERATING ROOM | Age: 65
DRG: 470 | End: 2020-10-27
Payer: MEDICARE

## 2020-10-28 ENCOUNTER — HOSPITAL ENCOUNTER (INPATIENT)
Age: 65
LOS: 1 days | Discharge: HOME | DRG: 470 | End: 2020-10-29
Attending: ORTHOPAEDIC SURGERY | Admitting: ORTHOPAEDIC SURGERY
Payer: MEDICARE

## 2020-10-28 ENCOUNTER — HOSPITAL ENCOUNTER (OUTPATIENT)
Dept: PREADMISSION TESTING | Age: 65
Discharge: HOME | End: 2020-10-28
Payer: MEDICARE

## 2020-10-28 ENCOUNTER — APPOINTMENT (OUTPATIENT)
Dept: GENERAL RADIOLOGY | Age: 65
DRG: 470 | End: 2020-10-28
Attending: ORTHOPAEDIC SURGERY
Payer: MEDICARE

## 2020-10-28 ENCOUNTER — ANESTHESIA (OUTPATIENT)
Dept: OPERATING ROOM | Age: 65
DRG: 470 | End: 2020-10-28
Payer: MEDICARE

## 2020-10-28 VITALS
OXYGEN SATURATION: 96 % | RESPIRATION RATE: 16 BRPM | SYSTOLIC BLOOD PRESSURE: 97 MMHG | DIASTOLIC BLOOD PRESSURE: 52 MMHG | TEMPERATURE: 98.6 F

## 2020-10-28 PROBLEM — M17.12 OSTEOARTHRITIS OF LEFT KNEE: Status: ACTIVE | Noted: 2020-10-28

## 2020-10-28 PROBLEM — M17.12 LOCALIZED OSTEOARTHRITIS OF LEFT KNEE: Status: ACTIVE | Noted: 2020-10-28

## 2020-10-28 LAB
ABO/RH: NORMAL
ANTIBODY SCREEN: NEGATIVE
ARM BAND NUMBER: NORMAL
EXPIRATION DATE: NORMAL
SARS-COV-2, RAPID: NOT DETECTED
SARS-COV-2: NORMAL
SARS-COV-2: NORMAL
SOURCE: NORMAL

## 2020-10-28 PROCEDURE — 3600000006 HC SURGERY LEVEL 6 BASE: Performed by: ORTHOPAEDIC SURGERY

## 2020-10-28 PROCEDURE — 2709999900 HC NON-CHARGEABLE SUPPLY: Performed by: ORTHOPAEDIC SURGERY

## 2020-10-28 PROCEDURE — 6370000000 HC RX 637 (ALT 250 FOR IP): Performed by: ORTHOPAEDIC SURGERY

## 2020-10-28 PROCEDURE — 97161 PT EVAL LOW COMPLEX 20 MIN: CPT

## 2020-10-28 PROCEDURE — 2580000003 HC RX 258: Performed by: ORTHOPAEDIC SURGERY

## 2020-10-28 PROCEDURE — 2500000003 HC RX 250 WO HCPCS: Performed by: ORTHOPAEDIC SURGERY

## 2020-10-28 PROCEDURE — 86850 RBC ANTIBODY SCREEN: CPT

## 2020-10-28 PROCEDURE — 3700000001 HC ADD 15 MINUTES (ANESTHESIA): Performed by: ORTHOPAEDIC SURGERY

## 2020-10-28 PROCEDURE — 7100000000 HC PACU RECOVERY - FIRST 15 MIN: Performed by: ORTHOPAEDIC SURGERY

## 2020-10-28 PROCEDURE — 6360000002 HC RX W HCPCS: Performed by: ORTHOPAEDIC SURGERY

## 2020-10-28 PROCEDURE — C1713 ANCHOR/SCREW BN/BN,TIS/BN: HCPCS | Performed by: ORTHOPAEDIC SURGERY

## 2020-10-28 PROCEDURE — 6360000002 HC RX W HCPCS: Performed by: NURSE ANESTHETIST, CERTIFIED REGISTERED

## 2020-10-28 PROCEDURE — U0002 COVID-19 LAB TEST NON-CDC: HCPCS

## 2020-10-28 PROCEDURE — C9290 INJ, BUPIVACAINE LIPOSOME: HCPCS | Performed by: NURSE ANESTHETIST, CERTIFIED REGISTERED

## 2020-10-28 PROCEDURE — 73560 X-RAY EXAM OF KNEE 1 OR 2: CPT

## 2020-10-28 PROCEDURE — C9803 HOPD COVID-19 SPEC COLLECT: HCPCS

## 2020-10-28 PROCEDURE — 86900 BLOOD TYPING SEROLOGIC ABO: CPT

## 2020-10-28 PROCEDURE — 2500000003 HC RX 250 WO HCPCS: Performed by: NURSE ANESTHETIST, CERTIFIED REGISTERED

## 2020-10-28 PROCEDURE — 3E0R3BZ INTRODUCTION OF ANESTHETIC AGENT INTO SPINAL CANAL, PERCUTANEOUS APPROACH: ICD-10-PCS | Performed by: NURSE ANESTHETIST, CERTIFIED REGISTERED

## 2020-10-28 PROCEDURE — 1200000000 HC SEMI PRIVATE

## 2020-10-28 PROCEDURE — C1776 JOINT DEVICE (IMPLANTABLE): HCPCS | Performed by: ORTHOPAEDIC SURGERY

## 2020-10-28 PROCEDURE — 64447 NJX AA&/STRD FEMORAL NRV IMG: CPT | Performed by: NURSE ANESTHETIST, CERTIFIED REGISTERED

## 2020-10-28 PROCEDURE — 0SRD0J9 REPLACEMENT OF LEFT KNEE JOINT WITH SYNTHETIC SUBSTITUTE, CEMENTED, OPEN APPROACH: ICD-10-PCS | Performed by: ORTHOPAEDIC SURGERY

## 2020-10-28 PROCEDURE — 88311 DECALCIFY TISSUE: CPT

## 2020-10-28 PROCEDURE — 86901 BLOOD TYPING SEROLOGIC RH(D): CPT

## 2020-10-28 PROCEDURE — 36415 COLL VENOUS BLD VENIPUNCTURE: CPT

## 2020-10-28 PROCEDURE — 94761 N-INVAS EAR/PLS OXIMETRY MLT: CPT

## 2020-10-28 PROCEDURE — 3600000016 HC SURGERY LEVEL 6 ADDTL 15MIN: Performed by: ORTHOPAEDIC SURGERY

## 2020-10-28 PROCEDURE — 3700000000 HC ANESTHESIA ATTENDED CARE: Performed by: ORTHOPAEDIC SURGERY

## 2020-10-28 PROCEDURE — 88305 TISSUE EXAM BY PATHOLOGIST: CPT

## 2020-10-28 DEVICE — IMPLANTABLE DEVICE: Type: IMPLANTABLE DEVICE | Site: KNEE | Status: FUNCTIONAL

## 2020-10-28 DEVICE — CEMENT BNE RADIOPAQUE FAST SET ACRYL RESIN HI VISC SIMPLEXHV: Type: IMPLANTABLE DEVICE | Site: KNEE | Status: FUNCTIONAL

## 2020-10-28 DEVICE — COMPONENT PAT DIA38MM DST POLY OVL DOME 3 PEG NP CEM MOD: Type: IMPLANTABLE DEVICE | Site: KNEE | Status: FUNCTIONAL

## 2020-10-28 DEVICE — TRAY TIB SZ 3 KNEE CO CHROM FIX BEAR MOD CEM PFC SIG: Type: IMPLANTABLE DEVICE | Site: KNEE | Status: FUNCTIONAL

## 2020-10-28 RX ORDER — CEFAZOLIN SODIUM 2 G/50ML
2 SOLUTION INTRAVENOUS
Status: COMPLETED | OUTPATIENT
Start: 2020-10-28 | End: 2020-10-28

## 2020-10-28 RX ORDER — AMLODIPINE BESYLATE 10 MG/1
10 TABLET ORAL DAILY
Status: DISCONTINUED | OUTPATIENT
Start: 2020-10-28 | End: 2020-10-29 | Stop reason: HOSPADM

## 2020-10-28 RX ORDER — SODIUM CHLORIDE 0.9 % (FLUSH) 0.9 %
10 SYRINGE (ML) INJECTION EVERY 12 HOURS SCHEDULED
Status: DISCONTINUED | OUTPATIENT
Start: 2020-10-28 | End: 2020-10-29 | Stop reason: HOSPADM

## 2020-10-28 RX ORDER — CETIRIZINE HYDROCHLORIDE 10 MG/1
10 TABLET ORAL DAILY
COMMUNITY

## 2020-10-28 RX ORDER — SODIUM CHLORIDE, SODIUM LACTATE, POTASSIUM CHLORIDE, CALCIUM CHLORIDE 600; 310; 30; 20 MG/100ML; MG/100ML; MG/100ML; MG/100ML
INJECTION, SOLUTION INTRAVENOUS CONTINUOUS
Status: DISCONTINUED | OUTPATIENT
Start: 2020-10-28 | End: 2020-10-29 | Stop reason: HOSPADM

## 2020-10-28 RX ORDER — SENNA AND DOCUSATE SODIUM 50; 8.6 MG/1; MG/1
1 TABLET, FILM COATED ORAL 2 TIMES DAILY
Status: DISCONTINUED | OUTPATIENT
Start: 2020-10-28 | End: 2020-10-29 | Stop reason: HOSPADM

## 2020-10-28 RX ORDER — OXYCODONE HYDROCHLORIDE 5 MG/1
10 TABLET ORAL EVERY 4 HOURS PRN
Status: DISCONTINUED | OUTPATIENT
Start: 2020-10-28 | End: 2020-10-29 | Stop reason: HOSPADM

## 2020-10-28 RX ORDER — ACETAMINOPHEN 325 MG/1
650 TABLET ORAL EVERY 6 HOURS
Status: DISCONTINUED | OUTPATIENT
Start: 2020-10-28 | End: 2020-10-29 | Stop reason: HOSPADM

## 2020-10-28 RX ORDER — ACETAMINOPHEN 10 MG/ML
INJECTION, SOLUTION INTRAVENOUS PRN
Status: DISCONTINUED | OUTPATIENT
Start: 2020-10-28 | End: 2020-10-28 | Stop reason: SDUPTHER

## 2020-10-28 RX ORDER — TRANEXAMIC ACID 100 MG/ML
INJECTION, SOLUTION INTRAVENOUS PRN
Status: DISCONTINUED | OUTPATIENT
Start: 2020-10-28 | End: 2020-10-28 | Stop reason: ALTCHOICE

## 2020-10-28 RX ORDER — LIDOCAINE HYDROCHLORIDE 10 MG/ML
INJECTION, SOLUTION EPIDURAL; INFILTRATION; INTRACAUDAL; PERINEURAL PRN
Status: DISCONTINUED | OUTPATIENT
Start: 2020-10-28 | End: 2020-10-28 | Stop reason: SDUPTHER

## 2020-10-28 RX ORDER — LISINOPRIL 5 MG/1
5 TABLET ORAL DAILY
Status: DISCONTINUED | OUTPATIENT
Start: 2020-10-28 | End: 2020-10-29 | Stop reason: HOSPADM

## 2020-10-28 RX ORDER — PROPOFOL 10 MG/ML
INJECTION, EMULSION INTRAVENOUS CONTINUOUS PRN
Status: DISCONTINUED | OUTPATIENT
Start: 2020-10-28 | End: 2020-10-28 | Stop reason: SDUPTHER

## 2020-10-28 RX ORDER — ROPIVACAINE HYDROCHLORIDE 5 MG/ML
INJECTION, SOLUTION EPIDURAL; INFILTRATION; PERINEURAL
Status: COMPLETED | OUTPATIENT
Start: 2020-10-28 | End: 2020-10-28

## 2020-10-28 RX ORDER — ROSUVASTATIN CALCIUM 20 MG/1
20 TABLET, COATED ORAL NIGHTLY
Status: DISCONTINUED | OUTPATIENT
Start: 2020-10-28 | End: 2020-10-29 | Stop reason: HOSPADM

## 2020-10-28 RX ORDER — BUPIVACAINE HYDROCHLORIDE 5 MG/ML
INJECTION, SOLUTION EPIDURAL; INTRACAUDAL
Status: COMPLETED | OUTPATIENT
Start: 2020-10-28 | End: 2020-10-28

## 2020-10-28 RX ORDER — ONDANSETRON 2 MG/ML
4 INJECTION INTRAMUSCULAR; INTRAVENOUS EVERY 6 HOURS PRN
Status: DISCONTINUED | OUTPATIENT
Start: 2020-10-28 | End: 2020-10-29 | Stop reason: HOSPADM

## 2020-10-28 RX ORDER — SODIUM CHLORIDE 0.9 % (FLUSH) 0.9 %
10 SYRINGE (ML) INJECTION EVERY 12 HOURS SCHEDULED
Status: DISCONTINUED | OUTPATIENT
Start: 2020-10-28 | End: 2020-10-28 | Stop reason: HOSPADM

## 2020-10-28 RX ORDER — OXYCODONE HYDROCHLORIDE 5 MG/1
5 TABLET ORAL EVERY 4 HOURS PRN
Status: DISCONTINUED | OUTPATIENT
Start: 2020-10-28 | End: 2020-10-29 | Stop reason: HOSPADM

## 2020-10-28 RX ORDER — FENTANYL CITRATE 50 UG/ML
INJECTION, SOLUTION INTRAMUSCULAR; INTRAVENOUS PRN
Status: DISCONTINUED | OUTPATIENT
Start: 2020-10-28 | End: 2020-10-28 | Stop reason: SDUPTHER

## 2020-10-28 RX ORDER — SODIUM CHLORIDE 0.9 % (FLUSH) 0.9 %
10 SYRINGE (ML) INJECTION PRN
Status: DISCONTINUED | OUTPATIENT
Start: 2020-10-28 | End: 2020-10-29 | Stop reason: HOSPADM

## 2020-10-28 RX ORDER — SODIUM CHLORIDE 0.9 % (FLUSH) 0.9 %
10 SYRINGE (ML) INJECTION PRN
Status: DISCONTINUED | OUTPATIENT
Start: 2020-10-28 | End: 2020-10-28 | Stop reason: HOSPADM

## 2020-10-28 RX ORDER — GLUCOSAMINE SULFATE 500 MG
2000 CAPSULE ORAL DAILY
COMMUNITY

## 2020-10-28 RX ORDER — OXYBUTYNIN CHLORIDE 5 MG/1
5 TABLET, EXTENDED RELEASE ORAL DAILY
Status: DISCONTINUED | OUTPATIENT
Start: 2020-10-28 | End: 2020-10-29 | Stop reason: HOSPADM

## 2020-10-28 RX ORDER — CLINDAMYCIN PHOSPHATE 900 MG/50ML
900 INJECTION INTRAVENOUS EVERY 8 HOURS
Status: COMPLETED | OUTPATIENT
Start: 2020-10-28 | End: 2020-10-29

## 2020-10-28 RX ADMIN — CEFAZOLIN SODIUM 2 G: 2 SOLUTION INTRAVENOUS at 10:20

## 2020-10-28 RX ADMIN — SENNOSIDES AND DOCUSATE SODIUM 1 TABLET: 8.6; 5 TABLET ORAL at 21:27

## 2020-10-28 RX ADMIN — FENTANYL CITRATE 50 MCG: 50 INJECTION, SOLUTION INTRAMUSCULAR; INTRAVENOUS at 10:23

## 2020-10-28 RX ADMIN — LISINOPRIL 5 MG: 5 TABLET ORAL at 21:27

## 2020-10-28 RX ADMIN — CLINDAMYCIN PHOSPHATE 900 MG: 900 INJECTION, SOLUTION INTRAVENOUS at 17:56

## 2020-10-28 RX ADMIN — LIDOCAINE HYDROCHLORIDE 5 ML: 10 INJECTION, SOLUTION EPIDURAL; INFILTRATION; INTRACAUDAL; PERINEURAL at 10:31

## 2020-10-28 RX ADMIN — ACETAMINOPHEN 1000 MG: 10 INJECTION, SOLUTION INTRAVENOUS at 11:01

## 2020-10-28 RX ADMIN — BUPIVACAINE 20 ML: 13.3 INJECTION, SUSPENSION, LIPOSOMAL INFILTRATION at 09:45

## 2020-10-28 RX ADMIN — SENNOSIDES AND DOCUSATE SODIUM 1 TABLET: 8.6; 5 TABLET ORAL at 14:24

## 2020-10-28 RX ADMIN — SODIUM CHLORIDE, POTASSIUM CHLORIDE, SODIUM LACTATE AND CALCIUM CHLORIDE: 600; 310; 30; 20 INJECTION, SOLUTION INTRAVENOUS at 08:14

## 2020-10-28 RX ADMIN — OXYBUTYNIN CHLORIDE 5 MG: 5 TABLET, EXTENDED RELEASE ORAL at 21:27

## 2020-10-28 RX ADMIN — ROSUVASTATIN CALCIUM 20 MG: 20 TABLET, COATED ORAL at 21:27

## 2020-10-28 RX ADMIN — SODIUM CHLORIDE, POTASSIUM CHLORIDE, SODIUM LACTATE AND CALCIUM CHLORIDE: 600; 310; 30; 20 INJECTION, SOLUTION INTRAVENOUS at 14:30

## 2020-10-28 RX ADMIN — OXYCODONE HYDROCHLORIDE 5 MG: 5 TABLET ORAL at 21:27

## 2020-10-28 RX ADMIN — AMLODIPINE BESYLATE 10 MG: 10 TABLET ORAL at 21:27

## 2020-10-28 RX ADMIN — FENTANYL CITRATE 50 MCG: 50 INJECTION, SOLUTION INTRAMUSCULAR; INTRAVENOUS at 10:31

## 2020-10-28 RX ADMIN — OXYCODONE HYDROCHLORIDE 5 MG: 5 TABLET ORAL at 14:24

## 2020-10-28 RX ADMIN — ROPIVACAINE HYDROCHLORIDE 15 ML: 5 INJECTION, SOLUTION EPIDURAL; INFILTRATION; PERINEURAL at 09:45

## 2020-10-28 RX ADMIN — BUPIVACAINE HYDROCHLORIDE 30 ML: 5 INJECTION, SOLUTION EPIDURAL; INTRACAUDAL; PERINEURAL at 09:45

## 2020-10-28 RX ADMIN — SODIUM CHLORIDE, POTASSIUM CHLORIDE, SODIUM LACTATE AND CALCIUM CHLORIDE: 600; 310; 30; 20 INJECTION, SOLUTION INTRAVENOUS at 10:46

## 2020-10-28 RX ADMIN — PROPOFOL 75 MCG/KG/MIN: 10 INJECTION, EMULSION INTRAVENOUS at 10:31

## 2020-10-28 ASSESSMENT — PAIN - FUNCTIONAL ASSESSMENT: PAIN_FUNCTIONAL_ASSESSMENT: 0-10

## 2020-10-28 ASSESSMENT — PAIN SCALES - GENERAL
PAINLEVEL_OUTOF10: 0
PAINLEVEL_OUTOF10: 4
PAINLEVEL_OUTOF10: 0
PAINLEVEL_OUTOF10: 3
PAINLEVEL_OUTOF10: 4
PAINLEVEL_OUTOF10: 0

## 2020-10-28 NOTE — ANESTHESIA POSTPROCEDURE EVALUATION
Department of Anesthesiology  Postprocedure Note    Patient: Everette Meckel  MRN: [id number]  YOB: 1955  Date of evaluation: 10/28/2020  Time:  1:34 PM     Procedure Summary     Date:  10/28/20 Room / Location:  [address] [place] [address]    Anesthesia Start:  5872 Anesthesia Stop:  5933    Procedure:  LEFT KNEE TOTAL ARTHROPLASTY - IFEANYI 360 (Left Knee) Diagnosis:  (LEFT KNEE O/A)    Surgeon:  Sae Vang DO Responsible Provider:  JESSENIA Fitzpatrick - CHUY    Anesthesia Type:  regional ASA Status:  2          Anesthesia Type: regional    April Phase I: April Score: 10         Last vitals: Reviewed and per EMR flowsheets.        Anesthesia Post Evaluation    Patient location during evaluation: floor  Patient participation: complete - patient participated  Level of consciousness: awake and alert  Pain score: 0  Airway patency: patent  Nausea & Vomiting: no nausea and no vomiting  Complications: no  Cardiovascular status: blood pressure returned to baseline and hemodynamically stable  Respiratory status: acceptable, spontaneous ventilation and nonlabored ventilation

## 2020-10-28 NOTE — PROGRESS NOTES
Time out performed per policy. Procedure and patient signature verified from consent prior to initiating block.

## 2020-10-28 NOTE — BRIEF OP NOTE
Brief Postoperative Note      Patient: Wild Wynne  YOB: 1955  MRN: [id number]    Date of Procedure: 10/28/2020    Pre-Op Diagnosis: LEFT KNEE O/A    Post-Op Diagnosis: same       Procedure(s):  LEFT KNEE TOTAL ARTHROPLASTY - IFEANYI 360    Surgeon(s):  Addie Cota DO    Assistant:  0  Anesthesia: Regional/ipac  Estimated Blood Loss (mL): 0    Complications:0    Specimens:   ID Type Source Tests Collected by Time Destination   A :  Bone Joint, Knee SURGICAL [address] [place] [address] 10/28/2020 1131        Implants:  Implant Name Type Inv.  Item Serial No.  Lot No. LRB No. Used Action   IMPL CEMENT SIMPLEX HV Shoulder/Arm/Wrist/Hand IMPL CEMENT SIMPLEX HV  IFEANYI: ORTHOPA[employer] [id number] Left 1 Implanted   IMPL CEMENT SIMPLEX HV Shoulder/Arm/Wrist/Hand IMPL CEMENT SIMPLEX HV  IFEANYI: ORTHOPAED[employer] [id number] Left 1 Implanted   IMPL KNEE TIB INSRT MOD CMNTD SZ 3 Knee IMPL KNEE TIB INSRT MOD CMNTD SZ 3  JNJ: [employer] ORTH[employer] [id number] Left 1 Implanted   IMPL KNEE FEM COMP POSTR STABLIZED LT 4N Knee IMPL KNEE FEM COMP POSTR STABLIZED LT 4N  JNJ: [employer]PU[employer] ORTHOPA[employer] [id number] Left 1 Implanted   IMPL KNEE TIB INSRT STBL 3X8MM Knee IMPL KNEE TIB INSRT STBL 3X8MM  JNJ: DEPUY ORTHOPA[employer] [id number] Left 1 Implanted   IMPL KNEE PATELLA OVAL DOME 3 PEG 38MM Knee IMPL KNEE PATELLA OVAL DOME 3 PEG 38MM  JNJ: Oliver Doherty [id number] Left 1 Implanted         Drains: 0    Findings: see op note TT 300mmHg    Electronically signed by Parker Clemons DO on 10/28/2020 at 11:49 AM

## 2020-10-28 NOTE — ANESTHESIA PROCEDURE NOTES
Spinal Block    Patient location during procedure: pre-op  Start time: 10/28/2020 9:40 AM  End time: 10/28/2020 9:45 AM  Reason for block: primary anesthetic  Staffing  Resident/CRNA: JESSENIA Shrestha CRNA  Preanesthetic Checklist  Completed: patient identified, site marked, surgical consent, pre-op evaluation, timeout performed, IV checked, risks and benefits discussed, monitors and equipment checked, anesthesia consent given, oxygen available and patient being monitored  Spinal Block  Patient position: sitting  Prep: ChloraPrep  Patient monitoring: cardiac monitor, continuous capnometry and frequent blood pressure checks  Approach: midline  Location: L4/L5  Procedures: ultrasound guided  Provider prep: mask and sterile gloves  Local infiltration: lidocaine  Agent: bupivacaine  Dose: 1.8  Dose: 1.8  Needle  Needle type: Pencan   Needle gauge: 25 G  Assessment  Sensory level: T8  Swirl obtained: Yes  CSF: clear  Attempts: 1  Hemodynamics: stable

## 2020-10-28 NOTE — PROGRESS NOTES
[facility]  Occupational Therapy  Evaluation  Date: 10/28/2020  Patient Name: Summer Perry        MRN: [id number]    : 1955  (77 y.o.)  Gender: female   Referring Practitioner: Dr. Yvonne Castaneda  Diagnosis: L TKA  Additional Pertinent Hx: Patient is admitted for an elective L TKA  Past Medical History:   Diagnosis Date    Arthritis     follow with Dr. Todd Hernandez [facility])     melanoma on chest    Discoid lupus     Hay fever     Hormone disorder     Hypertension     Lipoma 2009    Rosacea      Past Surgical History:   Procedure Laterality Date    BACK SURGERY      L-5, bethany @ [facility] BLADDER SURGERY      COLONOSCOPY      COLONOSCOPY  2016    Dr. Alfrieda Meigs:  1 adenomatous polyp    COLONOSCOPY N/A 2019    Dr. Alfrieda Meigs:  Hyperplastic polyps    FOOT SURGERY      HAMMER TOE SURGERY Right 14    2nd toe, bunion surgery great right toe    HAMMER TOE SURGERY Right 2020    RIGHT 3rd TOE HAMMER REPAIR W/  Right Great Toe EHL LEGNTHENING performed by Jelani Cleary DPM at [address] [place] [address] HAND TENDON SURGERY Bilateral     HYSTERECTOMY      KNEE ARTHROSCOPY Bilateral     LIPOMA RESECTION  2009    SKIN BIOPSY      rt chest       Other position/activity restrictions: (P) FWB LLE        Subjective  Subjective: Patient is seated in bedside chair upon arrival.  Pain Level: 4  Pain Location: Knee  Pain Orientation: Left     Vision  Vision: (P) Within Functional Limits  Hearing  Hearing: (P) Within functional limits  Social/Functional History  Lives With: Spouse  Type of Home: House  Home Layout: Two level(full bath on the 1st floor)  Home Access: Stairs to enter with rails  Entrance Stairs - Number of Steps: 1  Entrance Stairs - Rails: (step up onto a landing and then into the kitchen)  Bathroom Shower/Tub: Shower chair with back  [employer]: Handicap height  Bathroom Equipment: 3-in-1 commode, Shower chair  Home Equipment: [employer], Rolling walker, 4 wheeled walker  ADL Assistance: Independent  Homemaking Assistance: Independent  Homemaking Responsibilities: Yes  Meal Prep Responsibility: Primary  Laundry Responsibility: Primary  Cleaning Responsibility: Primary  Bill Paying/Finance Responsibility: Primary  Shopping Responsibility: Primary  Ambulation Assistance: Independent  Transfer Assistance: Independent  Active : Yes  Occupation: Retired  Prior Function  ADL Assistance: Independent  Homemaking Assistance: Independent  Ambulation Assistance: Independent  Transfer Assistance: Independent    Objective                     Gross LUE Strength: WFL  Gross RUE Strength: WFL  ADL  Feeding: Independent  Grooming: Independent  UE Bathing: Supervision  LE Bathing: Contact guard assistance  UE Dressing: Supervision  LE Dressing: Contact guard assistance  Toileting: Contact guard assistance           Balance  Sitting Balance: Independent  Standing Balance: Contact guard assistance                 Assessment: Patient seen this afternoon for OT evaluation. Tolerated session well, demonstrated ability to ambulate approx 50 feet x 2 with CGA. No difficulty managing underwear for toileting. Recommend OT services for possible AE educated as well as continued ADL education. Goals  Short term goals  Time Frame for Short term goals: 3 days  Short term goal 1: Patient to be modified independent with UB/LB bathing  Short term goal 2: Patient to be modified independent with UB/LB dressing  Short term goal 3: Patient to complete toileting task and hygiene modified independent  Short term goal 4: Patient to tolerate static standing x 5 minutes without LOB in order to complete self care tasks.     Plan       Times per day: Daily(1-2x day)     weeks: 3 days        Time In: 1600  Time Out: 1630  Timed Coded Minutes: 0  [facility]  [facility]/28[facility]2020

## 2020-10-28 NOTE — ANESTHESIA PRE PROCEDURE
Department of Anesthesiology  Preprocedure Note       Name:  Kin Mention   Age:  77 y.o.  :  1955                                          MRN:  [id number]         Date:  10/28/2020      Surgeon: Rex Osman):  Robert Mccarthy DO    Procedure: Procedure(s):  LEFT KNEE TOTAL ARTHROPLASTY - IFEANYI 360    Medications prior to admission:   Prior to Admission medications    Medication Sig Start Date End Date Taking? Authorizing Provider   cetirizine (ZYRTEC) 10 MG tablet Take 10 mg by mouth daily   Yes Historical Provider, MD   Glucosamine 500 MG CAPS Take 2,000 tablets by mouth daily   Yes Historical Provider, MD   rosuvastatin (CRESTOR) 20 MG tablet Take 1 tablet by mouth nightly 20  Yes JESSENIA Kasper CNP   amLODIPine (NORVASC) 10 MG tablet TAKE 1 TABLET BY MOUTH  DAILY 20  Yes JESSENIA Kasper CNP   lisinopril (PRINIVIL;ZESTRIL) 5 MG tablet TAKE 1 TABLET BY MOUTH  DAILY 20  Yes JESSENIA Kasper CNP   oxybutynin (DITROPAN-XL) 5 MG extended release tablet TAKE 1 TABLET BY MOUTH  DAILY 20  Yes JESSENIA Kasper CNP   furosemide (LASIX) 20 MG tablet Take 1 tablet by mouth daily  Patient taking differently: Take 20 mg by mouth daily Prescribed daily but takes PRN 20  Yes JESSENIA Kasper CNP   omeprazole (PRILOSEC) 20 MG delayed release capsule TAKE 1 CAPSULE BY MOUTH  DAILY 20  Yes JESSENIA Kasper CNP   NONFORMULARY Indications: Womens 1 a day 50 plus. Yes Historical Provider, MD   NONFORMULARY Indications: [address] hair skin and nails  2500 mcg 1 a day. Yes Historical Provider, MD   Magnesium 400 MG CAPS Take  by mouth daily. Yes Historical Provider, MD   hydroxychloroquine (PLAQUENIL) 200 MG tablet Take 200 mg by mouth daily.      Yes Historical Provider, MD   vitamin D (CHOLECALCIFEROL) 400 UNITS TABS tablet Take 5,000 Units by mouth daily    Yes Historical Provider, MD   calcium-vitamin D (OSCAL) 250-125 MG-UNIT per tablet Take 1 tablet by mouth daily Vit D and Vit K 750 mg daily, takes 3 a day now   Yes Historical Provider, MD   Ascorbic Acid (VITAMIN C) 500 MG tablet Take 500 mg by mouth daily. Yes Historical Provider, MD   ibuprofen (ADVIL;MOTRIN) 800 MG tablet TAKE 1 TABLET BY MOUTH  EVERY 12 HOURS AS NEEDED  FOR PAIN 7/21/20   JESSENIA Patten CNP   Coenzyme Q10 (CO Q-10) 200 MG CAPS Take 1 capsule by mouth    Historical Provider, MD   Elastic Bandages & Supports ([address]) [address] 1 each by Does not apply route daily as needed (varicose veins with leg swelling) 20-30 mmHG full length  Patient taking differently: 1 each by Does not apply route daily as needed (varicose veins with leg swelling) 20-30 mmHG full length, not wearing 12/11/19   JESSENIA Patten CNP   vitamin B-12 (CYANOCOBALAMIN) 500 MCG tablet Take 500 mcg by mouth daily    Historical Provider, MD   Polypodium Leucotomos (HELIOCARE PO) Take 1 tablet by mouth daily    Historical Provider, MD   MIRVASO 0.33 % GEL  11/1/16   Historical Provider, MD   aspirin 81 MG tablet Take 81 mg by mouth daily. Historical Provider, MD   fish oil-omega-3 fatty acids 1000 MG capsule Take 1 g by mouth 2 times daily. Historical Provider, MD       Current medications:    Current Facility-Administered Medications   Medication Dose Route Frequency Provider Last Rate Last Dose    sodium chloride flush 0.9 % injection 10 mL  10 mL Intravenous 2 times per day Aziza Mars DO        sodium chloride flush 0.9 % injection 10 mL  10 mL Intravenous PRN Aziza Mars DO        lactated ringers infusion   Intravenous Continuous Aziza Mars  mL/hr at 10/28/20 5040      ceFAZolin (ANCEF) 2 g in dextrose 3 % 50 mL IVPB (duplex)  2 g Intravenous On Call to [address], DO           Allergies:     Allergies   Allergen Reactions    Augmentin [Amoxicillin-Pot Clavulanate]      Gave Diarhhea         Problem List:    Patient Active Height: 5' 7\" (1.702 m)                                               BP Readings from Last 3 Encounters:   10/28/20 (!) 126/56   09/02/20 120/86   03/13/20 110/80       NPO Status: Time of last liquid consumption: 2100                        Time of last solid consumption: 1830                        Date of last liquid consumption: 10/27/20                        Date of last solid food consumption: 10/27/20    BMI:   Wt Readings from Last 3 Encounters:   10/28/20 174 lb 1.6 oz (79 kg)   09/02/20 175 lb (79.4 kg)   03/13/20 192 lb (87.1 kg)     Body mass index is 27.27 kg/m². CBC:   Lab Results   Component Value Date    WBC 6.1 10/19/2020    RBC 4.02 10/19/2020    RBC 4.54 10/19/2011    HGB 12.4 10/19/2020    HCT 37.0 10/19/2020    MCV 92.2 10/19/2020    RDW 15.0 10/19/2020     10/19/2020     10/19/2011       CMP:   Lab Results   Component Value Date     10/19/2020    K 4.1 10/19/2020     10/19/2020    CO2 26 10/19/2020    BUN 22 10/19/2020    CREATININE 0.93 10/19/2020    GFRAA >60 10/19/2020    LABGLOM >60 10/19/2020    GLUCOSE 102 10/19/2020    PROT 7.5 05/20/2020    CALCIUM 10.1 10/19/2020    BILITOT 0.26 05/20/2020    ALKPHOS 90 05/20/2020    AST 25 05/20/2020    ALT 17 05/20/2020       POC Tests: No results for input(s): POCGLU, POCNA, POCK, POCCL, POCBUN, POCHEMO, POCHCT in the last 72 hours.     Coags: No results found for: PROTIME, INR, APTT    HCG (If Applicable): No results found for: PREGTESTUR, PREGSERUM, HCG, HCGQUANT     ABGs: No results found for: PHART, PO2ART, BLV8YQU, EUL1YLU, BEART, E6KESAGD     Type & Screen (If Applicable):  No results found for: LABABO, LABRH    Drug/Infectious Status (If Applicable):  Lab Results   Component Value Date    HEPCAB NONREACTIVE 07/24/2017       COVID-19 Screening (If Applicable):   Lab Results   Component Value Date    COVID19 Not Detected 10/28/2020         Anesthesia Evaluation  Patient summary reviewed and Nursing notes reviewed no history of anesthetic complications:   Airway: Mallampati: II  TM distance: >3 FB   Neck ROM: full  Mouth opening: > = 3 FB Dental: normal exam         Pulmonary:Negative Pulmonary ROS and normal exam  breath sounds clear to auscultation                             Cardiovascular:    (+) hypertension:,       ECG reviewed  Rhythm: regular  Rate: normal                    Neuro/Psych:   Negative Neuro/Psych ROS              GI/Hepatic/Renal: Neg GI/Hepatic/Renal ROS            Endo/Other: Negative Endo/Other ROS                    Abdominal:           Vascular: negative vascular ROS. Anesthesia Plan      regional     ASA 2       Induction: intravenous. MIPS: Postoperative opioids intended and Prophylactic antiemetics administered. Anesthetic plan and risks discussed with patient. Use of blood products discussed with patient whom consented to blood products. Plan discussed with attending.                   JESSENIA Tao - CRNA   10/28/2020

## 2020-10-28 NOTE — PROGRESS NOTES
[facility]  [facility]  Date: 10/28/2020  Patient Name: Ahmet Duke        MRN: [id number]    : 1955  (77 y.o.)  Gender: female   Referring Practitioner: Dr. Verena Marcos  Diagnosis: Left TKR  Additional Pertinent Hx: Patient admitted for elective left TKR and referred to PT to assess mobility and strength   Past Medical History:   Diagnosis Date    Arthritis     follow with Dr. Ry Nolan [facility])     melanoma on chest    Discoid lupus     Hay fever     Hormone disorder     Hypertension     Lipoma 2009    Rosacea      Past Surgical History:   Procedure Laterality Date    BACK SURGERY      L-5, [place] @ [address]      COLONOSCOPY      COLONOSCOPY  2016    Dr. Jane Iniguez:  1 adenomatous polyp    COLONOSCOPY N/A 2019    Dr. Jane Iniguez:  Hyperplastic polyps    FOOT SURGERY      HAMMER TOE SURGERY Right 14    2nd toe, bunion surgery great right toe    HAMMER TOE SURGERY Right 2020    RIGHT 3rd TOE HAMMER REPAIR W/  Right Great Toe EHL LEGNTHENING performed by Tom Huber DPM at [address] [place] [address] HAND TENDON SURGERY Bilateral     HYSTERECTOMY      KNEE ARTHROSCOPY Bilateral     LIPOMA RESECTION  2009    SKIN BIOPSY      rt chest       Restrictions  Other position/activity restrictions: FWB LLE      Subjective         Pain Level: 4    Orientation  Overall Orientation Status: Within Normal Limits    Home Living  Type of Home: House  Home Layout: Two level(full bath on the 1st floor)  Entrance Stairs - Number of Steps: 1  Entrance Stairs - Rails: (step up onto a landing and then into the kitchen)  Home Access: Stairs to enter with rails  Home Equipment: [address], Rolling walker, 4 wheeled walker    Prior Level of Function  Prior Function  ADL Assistance: Independent  Homemaking Assistance: Independent  Ambulation Assistance: Independent  Transfer Assistance: Independent      Objective                    RUE AROM (degrees)  RUE AROM : WNL  LUE AROM (degrees)  LUE AROM : WNL  Strength RLE  Strength RLE: WFL  Strength LLE  Comment: 3/5 knee extension, hip flexion and ankle DF  Strength RUE  Strength RUE: WFL  Strength LUE  Strength LUE: WFL                Sit to Stand: Supervision, Independent  Stand to sit: Supervision, Independent           Ambulation 1  Surface: level tile  Device: Rolling Walker  Assistance: Stand by assistance, Contact guard assistance  Quality of Gait: steady  Distance: 50 ft x 2  Balance  Sitting - Static: Good  Sitting - Dynamic: Good  Standing - Static: Good    Assessment  Activity Tolerance: Patient Tolerated treatment well   Chart Reviewed: Yes  Assessment: Patient admitted for elective left TKR. She is ambulatory with wh walker and CGA and transfers with supervision. Patient will require stairs education . She is scheduled for home care PT  Prognosis: Good  Discharge Recommendations: Home with Home health PT     Type of devices: Left in chair, Gait belt, Call light within reach, Nurse notified     Plan  Times per week: daily  Times per day: Twice a day  Current Treatment Recommendations: Strengthening, Functional Mobility Training, Gait Training, Stair training    Goals  Short term goals  Time Frame for Short term goals: 2 days  Short term goal 1: Independent transfers  in/out of bed  Short term goal 2:  Independent ambulation with wh walker x 75 ft to safely negotiate home environment  Short term goal 3: Up/down steps and also 1 curb step with CGA  Short term goal 4: Good standing balance to complete ADL's            Time In: 1600  Time Out: 1630  Timed Coded Minutes: 0  Total Treatment Time: 30     BERTO STRONG, PT 10/28/2020

## 2020-10-28 NOTE — ANESTHESIA PROCEDURE NOTES
Left knee adductor canal, ipack, BHARGAV    Patient location during procedure: pre-op  Start time: 10/28/2020 9:40 AM  End time: 10/28/2020 10:00 AM  Staffing  Resident/CRNA: JESSENIA Bojorquez CRNA  Preanesthetic Checklist  Completed: patient identified, site marked, surgical consent, pre-op evaluation, timeout performed, IV checked, risks and benefits discussed, monitors and equipment checked, anesthesia consent given, oxygen available and patient being monitored  Peripheral Block  Patient position: supine  Prep: ChloraPrep  Patient monitoring: cardiac monitor, continuous pulse ox, frequent blood pressure checks and IV access  Block type: iPacks, Saphenous and Anterior knee  Laterality: left  Injection technique: single-shot  Procedures: ultrasound guided  Provider prep: mask and sterile gloves  Needle  Needle type: Other   Needle gauge: 21 G  Needle length: 11 cm  Needle localization: ultrasound guidanceOther needle type: pajunk  Assessment  Injection assessment: negative aspiration for heme, no paresthesia on injection and local visualized surrounding nerve on ultrasound  Slow fractionated injection: yes  Hemodynamics: stable  Additional Notes  Patient to PACU for blocks. Monitors on. O2 as needed to maintain sats greater than 92%. See nurses notes for O2 administration. Time out. Surgical site marked. 2mg versed given. Patient sitting up for spinal.  Spinal with ease with 1.4ml 0.75% bupivicaine  using a 25g pencan needle, free flow csf, no heme, no parasthesias on 1 attempt. Patient then supine and positioned frog leg for subsequent blocks. Adductor canal block done with ultrasound with ease using 15ml 0.5% ropivicaine with 5mg decadron. iPACK and Local Infiltration Analgesia done with ultrasound using a total of 80 ml of the following mixture: 30 ml bupivicaine 0.5%, 20ml Exparel, 30ml normal saline. Negative aspiration throughout blocks prior to injecting.     Target structures enveloped with local, with visualization of structures throughout. Patient tolerated well, VSS.    Mireille Leal CRNA  Medications Administered  Bupivacaine (PF) (MARCAINE) 0.5 % injection, 30 mL  bupivacaine liposome (EXPAREL) 1.3 % injection, 20 mL  ropivacaine (NAROPIN) 0.5% injection, 15 mL  Reason for block: post-op pain management and at surgeon's request

## 2020-10-28 NOTE — PLAN OF CARE
[facility]  Inpatient Physical Therapy  Plan of Care  Date: 10/28/2020  Patient Name: Rory Romero        : 1955  (77 y.o.)  Referring Practitioner: Dr. Pavel Aviles  Admission Date: 10/28/2020  Referral Date : 10/28/20  Diagnosis: Left TKR  Treatment Diagnosis: Difficulty with amb  PT Orders Received and Evaluation Complete  Identified Problem Areas:  Prognosis: Good    Justification for Skilled Services:  [] Reduce Falls   [x] Improve Ambulation  [x]  Complete Daily Tasks Safely   [] Improve Balance   [x] Improve LE strength  []  Return to Prior Level of Function  [x] Improve Functional Mobility   []  Family/Caregiver Education  [x] Patient Education: [x]Assistive Devices [x]Home Exercise Program and Progression    Plan  Times per week: daily  Times per day: Twice a day  [] Modalities:  [x] Therapeutic Exercise   [x] Gait Training  [x] Therapeutic Activity    [] Home Safety Evaluation         [] Massage                        [] Neuromuscular Re-education [] Back Education             [x] Patient Education [] Home Exercise Program  Discharge Recommendations: Home with Home health PT    Rehab Potential:  [x]  Good [] Fair   []  Poor    Goals  Short term goals  Time Frame for Short term goals: 2 days  Short term goal 1: Independent transfers  in/out of bed  Short term goal 2: Independent ambulation with wh walker x 75 ft to safely negotiate home environment  Short term goal 3: Up/down steps and also 1 curb step with CGA  Short term goal 4: Good standing balance to complete ADL's         Upon Swingbed Transfer this Plan of Care will be followed until revision is complete.     BERTO STRONG, PT   Date: 10/28/2020

## 2020-10-28 NOTE — PLAN OF CARE
[facility]    Inpatient Occupational Therapy  Plan of Care  OT Orders Received and Evaluation Complete  Date: 10/28/2020  Patient Name: Suresh Haskins        MRN: [id number]    : 1955  (77 y.o.)  Referring Practitioner: Dr. London Keith  Onset Date: 10/28/20  Referral Date: 10/28/2020  Diagnosis: L TKA  Treatment Diagnosis: weakness    Identified Problem Areas  Performance deficits / Impairments: Decreased functional mobility , Decreased endurance, Decreased ADL status, Decreased high-level IADLs  Assessment: Patient seen this afternoon for OT evaluation. Tolerated session well, demonstrated ability to ambulate approx 50 feet x 2 with CGA. No difficulty managing underwear for toileting. Recommend OT services for possible AE educated as well as continued ADL education. Prognosis: Good     Justification for Skilled Services:  [x]  Complete Daily Tasks Safely  [x] Improve Balance   [x]  Return to Prior Level of Function  []  Family/Caregiver Education    [] Improve UE strength  [x] Patient Education: [x]Adaptive Equipment   [x]Home Exercise Program and Progression    Treatment Plan  Plan  Times per day: Daily(1-2x day)  [] Modalities:  [x] Therapeutic Exercise   [x] Therapeutic Activity  [] Splinting:     [] Home Safety Evaluation         [x] ADL Retraining                       [] Muscle Re-education [] Cognitive Retraining            [] Sensory Integration  [x] Patient Education [x] Home Exercise Program [] Fine Motor Coordination  Discharge Recommendations: Home with assist PRN    GOALS  Short term goals  Time Frame for Short term goals: 3 days  Short term goal 1: Patient to be modified independent with UB/LB bathing  Short term goal 2: Patient to be modified independent with UB/LB dressing  Short term goal 3: Patient to complete toileting task and hygiene modified independent  Short term goal 4: Patient to tolerate static standing x 5 minutes without LOB in order to complete self care tasks.     Upon Swingbed Transfer this Plan of Care will be followed until revision is completed.     Cinthya Culver, OTR/L                    Date: 10/28/2020

## 2020-10-29 VITALS
RESPIRATION RATE: 18 BRPM | TEMPERATURE: 98 F | SYSTOLIC BLOOD PRESSURE: 152 MMHG | HEART RATE: 68 BPM | DIASTOLIC BLOOD PRESSURE: 84 MMHG | BODY MASS INDEX: 27.33 KG/M2 | WEIGHT: 174.1 LBS | OXYGEN SATURATION: 93 % | HEIGHT: 67 IN

## 2020-10-29 LAB
ABSOLUTE EOS #: 0 K/UL (ref 0–0.4)
ABSOLUTE IMMATURE GRANULOCYTE: ABNORMAL K/UL (ref 0–0.3)
ABSOLUTE LYMPH #: 1 K/UL (ref 1–4.8)
ABSOLUTE MONO #: 0.9 K/UL (ref 0–1)
ANION GAP SERPL CALCULATED.3IONS-SCNC: 9 MMOL/L (ref 9–17)
BASOPHILS # BLD: 0 % (ref 0–2)
BASOPHILS ABSOLUTE: 0 K/UL (ref 0–0.2)
BUN BLDV-MCNC: 19 MG/DL (ref 8–23)
BUN/CREAT BLD: 23 (ref 9–20)
CALCIUM SERPL-MCNC: 9.3 MG/DL (ref 8.6–10.4)
CHLORIDE BLD-SCNC: 105 MMOL/L (ref 98–107)
CO2: 23 MMOL/L (ref 20–31)
CREAT SERPL-MCNC: 0.84 MG/DL (ref 0.5–0.9)
DIFFERENTIAL TYPE: YES
EOSINOPHILS RELATIVE PERCENT: 0 % (ref 0–5)
GFR AFRICAN AMERICAN: >60 ML/MIN
GFR NON-AFRICAN AMERICAN: >60 ML/MIN
GFR SERPL CREATININE-BSD FRML MDRD: ABNORMAL ML/MIN/{1.73_M2}
GFR SERPL CREATININE-BSD FRML MDRD: ABNORMAL ML/MIN/{1.73_M2}
GLUCOSE BLD-MCNC: 142 MG/DL (ref 70–99)
HCT VFR BLD CALC: 33.5 % (ref 36–46)
HEMOGLOBIN: 11.1 G/DL (ref 12–16)
IMMATURE GRANULOCYTES: ABNORMAL %
LYMPHOCYTES # BLD: 9 % (ref 15–40)
MCH RBC QN AUTO: 31 PG (ref 26–34)
MCHC RBC AUTO-ENTMCNC: 33.2 G/DL (ref 31–37)
MCV RBC AUTO: 93.1 FL (ref 80–100)
MONOCYTES # BLD: 8 % (ref 4–8)
NRBC AUTOMATED: ABNORMAL PER 100 WBC
PDW BLD-RTO: 14.4 % (ref 12.1–15.2)
PLATELET # BLD: 205 K/UL (ref 140–450)
PLATELET ESTIMATE: ABNORMAL
PMV BLD AUTO: ABNORMAL FL (ref 6–12)
POTASSIUM SERPL-SCNC: 4.3 MMOL/L (ref 3.7–5.3)
RBC # BLD: 3.59 M/UL (ref 4–5.2)
RBC # BLD: ABNORMAL 10*6/UL
SEG NEUTROPHILS: 83 % (ref 47–75)
SEGMENTED NEUTROPHILS ABSOLUTE COUNT: 9.3 K/UL (ref 2.5–7)
SODIUM BLD-SCNC: 137 MMOL/L (ref 135–144)
WBC # BLD: 11.1 K/UL (ref 3.5–11)
WBC # BLD: ABNORMAL 10*3/UL

## 2020-10-29 PROCEDURE — 2580000003 HC RX 258: Performed by: ORTHOPAEDIC SURGERY

## 2020-10-29 PROCEDURE — 94761 N-INVAS EAR/PLS OXIMETRY MLT: CPT

## 2020-10-29 PROCEDURE — 2500000003 HC RX 250 WO HCPCS: Performed by: ORTHOPAEDIC SURGERY

## 2020-10-29 PROCEDURE — 97530 THERAPEUTIC ACTIVITIES: CPT

## 2020-10-29 PROCEDURE — 85025 COMPLETE CBC W/AUTO DIFF WBC: CPT

## 2020-10-29 PROCEDURE — 6360000002 HC RX W HCPCS: Performed by: ORTHOPAEDIC SURGERY

## 2020-10-29 PROCEDURE — 80048 BASIC METABOLIC PNL TOTAL CA: CPT

## 2020-10-29 PROCEDURE — 97535 SELF CARE MNGMENT TRAINING: CPT

## 2020-10-29 PROCEDURE — 97116 GAIT TRAINING THERAPY: CPT

## 2020-10-29 PROCEDURE — 6370000000 HC RX 637 (ALT 250 FOR IP): Performed by: ORTHOPAEDIC SURGERY

## 2020-10-29 PROCEDURE — 36415 COLL VENOUS BLD VENIPUNCTURE: CPT

## 2020-10-29 RX ADMIN — OXYCODONE HYDROCHLORIDE 10 MG: 5 TABLET ORAL at 09:11

## 2020-10-29 RX ADMIN — ENOXAPARIN SODIUM 30 MG: 30 INJECTION SUBCUTANEOUS at 07:37

## 2020-10-29 RX ADMIN — OXYCODONE HYDROCHLORIDE 10 MG: 5 TABLET ORAL at 13:33

## 2020-10-29 RX ADMIN — SENNOSIDES AND DOCUSATE SODIUM 1 TABLET: 8.6; 5 TABLET ORAL at 07:36

## 2020-10-29 RX ADMIN — CLINDAMYCIN PHOSPHATE 900 MG: 900 INJECTION, SOLUTION INTRAVENOUS at 01:47

## 2020-10-29 RX ADMIN — Medication 10 ML: at 09:00

## 2020-10-29 RX ADMIN — OXYCODONE HYDROCHLORIDE 5 MG: 5 TABLET ORAL at 05:14

## 2020-10-29 RX ADMIN — ACETAMINOPHEN 650 MG: 325 TABLET, FILM COATED ORAL at 01:46

## 2020-10-29 ASSESSMENT — PAIN SCALES - GENERAL
PAINLEVEL_OUTOF10: 6
PAINLEVEL_OUTOF10: 4
PAINLEVEL_OUTOF10: 4
PAINLEVEL_OUTOF10: 7

## 2020-10-29 NOTE — PROGRESS NOTES
[facility]  [facility]upational Therapy  Daily Note  Date: 10/29/2020  Patient Name: Summer Perry        MRN: [id number]    : 1955  (77 y.o.)    Subjective: Patient is seated in bedside chair upon arrival.  Pt is PROGRESSING toward goals and independence of Self Care  Continue to assess Pending Progress    Objective  ADL  Feeding: Independent  Grooming: Independent  UE Bathing: Modified independent , Setup  LE Bathing: Supervision  UE Dressing: Modified independent , Setup  LE Dressing: Stand by assistance, Setup  Toileting: Stand by assistance  Toilet Transfer: Stand by assistance  Shower Transfers: Contact Guard, Verbal cues     Balance  Sitting Balance: Independent  Standing Balance: Stand by assistance  Standing Balance  Time: 10 minutes  Activity: ADLs standing in shower  Comment: No LOB noted, demos good use of grab bars    Sit to stand: Stand by assistance  Stand to sit: Stand by assistance  Stand Step Transfers: Stand by assistance    Assessment  Assessment: Patient seated in bedside recliner upon arrival. Agrees to full ADL session, see above information regarding assist levels. Declines need to be seated during shower, wishes to stand. Stands in shower for ~10 min with supervision. Demos good safety awareness and judgement, as pt consistently uses hand rails and grab bars. Completes UB and LB dressing seated on commode without AE, as it was not required. Completes remaining grooming tasks standing at sink. No LOB was demonstrated during ambulation, transfers, or extended standing tasks. Pt accidentally pulls out IV following shower while drying off, begins to heavily bleed. RN made aware and she addressed the problem. Pt remains in recliner at end of session. Call light and other personal items within reach.     Prognosis: Good  Discharge Recommendations: Home with assist PRN  Activity Tolerance: Patient Tolerated treatment well  Safety Devices in place: Yes    Exercises:  See

## 2020-10-29 NOTE — PROGRESS NOTES
S/P Left TKA POD#1:    Pain well controlled. Denies CP or SOB. No new complaints. VSS, lt thigh and leg supple with expected swelling. Dressing CDI. NVM intact distally. Mechanical DVT prevention in place. Block site intact without hematoma. AM labs reviewed, hgb and wbc  11. 1. Xrays in RR show stable postion and alignment of the left TKA. S/P lt TKA POD#1, stable expected post surgical acute blood loss anemia, HTN, hx A. Fib    Initiate DVT Lovenox plus mechanical prophylaxis. Perioperative antibiotics per SCIP protocol. Aquacell dressing 10 days. Staples out POD#14. Cryocuff per protocol. Ortho 360 PT tomorrow. PT and OT services as ordered. All questions answered at the bedside.

## 2020-10-29 NOTE — OP NOTE
[place] [address]                                OPERATIVE REPORT    PATIENT NAME: Bart Dasilva                      :        1955  MED REC NO:   [id number]                              ROOM:       8525  ACCOUNT NO:   [de-identified]                           ADMIT DATE: 10/28/2020  PROVIDER:     Kenton Tran    DATE OF PROCEDURE:  10/28/2020    SURGEON:  Dr. Kenton Tran. ASSISTANT:  Susana Kaur. ANESTHESIA:  Charlaine Pac, CRNA; spinal sedation with iPACK block. PREOPERATIVE DIAGNOSIS:  Left knee end-stage osteoarthritis with valgus  deformity. POSTOPERATIVE DIAGNOSIS:  Left knee end-stage osteoarthritis with valgus  deformity. PROCEDURE:  Left total knee arthroplasty. TOURNIQUET TIME:  See nurses' record, 300 mmHg. SPECIMEN:  Bone. EBL:  Zero. IMPLANTS UTILIZED:  DePuy PFC knee system with a cemented PS4 narrow  left femur, a cemented #3 fixed tibial tray, an 8 fixed PS insert, and a  38 mm patellar button. COMPLICATIONS:  None. HISTORY AND INDICATIONS:  The patient is an 69-year-old female with  progressive valgus knee osteoarthritis that has been recalcitrant to  conservative injection care. Please see office notes, H and P. Site  was marked preoperatively. All questions were answered preoperatively. Antibiotics provided weight-based per protocol. Please see office  notes, H and P for failure of conservative injection care. She has  tried Tylenol, ice, anti-inflammatory injections, brace, and physical  therapy. Her x-rays report support grade 4 bone on bone disease of the  lateral compartment. Valgus deformity is present. Consent form signed  and witnessed. All questions were answered preoperatively. PROCEDURE IN DETAIL:  The patient was taken to the operating suite,  placed in the supine position, anesthesia provided.   A well-padded  tourniquet was placed in the left upper thigh. Leg was _____ prepped  and draped in the sterile fashion with Betadine prep. Time-out  procedure occurred consistent with the consent form, H and P,  preoperative marked site, landmarks identified. Sterile tourniquet was  utilized. The leg was exsanguinated. Tourniquet was inflated to 300  mmHg. Once time-out procedure was confirmed, a midline incision was  made of approximately 6 inches. She had very thin skin. Medial  parapatellar arthrotomy was performed. The patella was everted. She  had end-stage tricompartmental degenerative changes particularly in the  lateral and patellofemoral joints with mild-to-moderate valgus  deformity. The patella was appropriately cut and retracted. Irrisept  was allowed to soak per protocol. This was sized at a 38. Intramedullary drill and jig device were placed in the flexed knee into  the femur. A 5-degree valgus cut taking off 11 mm was performed. This  was sized at a #4 narrow. Anterior, posterior, chamfer cuts, posterior  stabilized box cut was performed. ACL and PCL were resected. Collateral ligaments were resected and balanced. Meniscal remnants were  removed. Intramedullary drill and jig device were placed at tibia. The  tibia was cut. Gaps were symmetrical.  Posterior gutters were clean and  free. Tibia was prepared for #3 tray. Trial components were placed  with full extension, appropriate flexion. Patellofemoral tracking was  appropriate with a 38 mm trial button. All trial components were  removed. It was copiously irrigated out throughout the procedure, a  total of 3 liters with 50,000 units of Bacitracin. The [place] Simplex  cement was mixed. All components cemented into place and allowed to  harden for 16 minutes. All cement and osteophytes were removed. It was  taken through arc of motion and deemed stable.   After copious  irrigation, 2 gm of tranexamic acid was placed subfascially. The  fascial layer was closed with #2 Quill suture in a running fashion, 2-0  Quill suture closed the subcutaneous tissues, staples applied. A  10-inch Aquacel dressing with Sof-Rol wrap was provided. Tourniquet was  deflated. The patient was awakened from anesthesia and transferred to  the recovery room in stable and satisfactory condition. CASE:  Clean and elective. SPONGE AND NEEDLE COUNT:  Correct. SPECIMEN:  Bone. PATIENT CONDITION:  Satisfactory.         WILLIS SCHUSTER    D: 10/28/2020 11:48:51       T: 10/28/2020 14:16:35     [email or web address]  Job#: [id number]     Doc#: [id number]    CC:  [facility]

## 2020-10-29 NOTE — PROGRESS NOTES
SW met with pt this morning to complete assessment during quality rounds. Pt is alert and oriented and cooperative with assessment. Pt is a 77year old  female admitted for L TKA. Pt lives with her spouse in their home in [facility]. Pt reports that she has multiple pieces of DME including a walker, cane, shower chair, 3 in 1 commode, wheelchair, elevated toilet and bars on the toilet. Pt will be using [facility] program for therapy. Pt drives and her spouse will assist with transport while she is healing. Pt is a full code and follows with Hugh Trejo CNP as PCP. Pt has advance directives on file in her medical record. Pt reports that her medications are completely covered. Pt plans to return home today at discharge with spouse. Pt identifies no other discharge needs or concerns. SW will remain available as needed.  Irlanda STERN LSW 10/29/2020

## 2020-10-29 NOTE — PROGRESS NOTES
Phone: [facility]  Date: 10/29/2020  Fax: [phone]      Physical Therapy    Daily Note    Patient Name: Natalie Delarosa      : 1955  (77 y.o.)  MRN: [id number]     Pt is PROGRESSING toward goals and increased independence of mobility  Discharge Recommendations: Home with Home health PT     Assessment                   Sit to Stand: Supervision, Independent  Stand to sit: Supervision, Independent                Ambulation 1  Surface: level tile  Device: Rolling Walker  Assistance: Stand by assistance, Contact guard assistance  Quality of Gait: steady  Distance: 75ft x2  Comments: patient room to therapy room back to patient room        Stairs  # Steps : 5(x2 laps)  Stairs Height: 4\"(and 6\")  Rails: Bilateral  Curbs: 4\"(with wheeled walker)  Assistance: Contact guard assistance, Stand by assistance    Assessment: Patient in chair upon arrival to room. Sit to stand from chair is mod I.  Ambulates with wheeled walker to therapy room without LOB. She is able to complete up/down steps 4\" and 6\" in height with B handrails and CGA/SBA. Also instructed on curb step utilizing wheeled walker to complete. Completes this with CGA/SBA. Discussed HEP of ankle pumps, quad sets, and glute sets and she denies any problems. States she did them all  night as she had trouble sleeping. Discussed car transfers and she feels comfortable with this. Ambulates back to room to sit up in chair with call light in reach. Plans to discharge home later today and will continue with home health PT. Safety Devices  Type of devices: Call light within reach, Gait belt, Left in chair          Time In: 08  Time Out: 904  Timed Coded Minutes: 24  Total Treatment Time: 24      Plan  Cont Per Plan Of Care    Goals  Short Term Goals  Time Frame for Short term goals: 2 days  Short term goal 1: Independent transfers  in/out of bed  Short term goal 2:  Independent ambulation with wh walker x 75 ft to safely negotiate home environment  Short term goal 3: Up/down steps and also 1 curb step with CGA-MET  Short term goal 4: Good standing balance to complete ADL's       Long Term Goals                      [facility] License Number: [id number]    Date: 10/29/2020

## 2020-10-29 NOTE — PROGRESS NOTES
Quality flow rounds held on 10/29/20     Mary Russ is admitted for  Left TKA. Length of stay 1. Education:    Needed Education: wound care, meds, follow up,diet      Do you have any questions regarding your plan of care while at the hospital? denies    Planned Disposition:               [x]  Home when able                [] Swing Bed                [] ECF/SNF               [] Other/TBD    Barriers to Discharge:    Can you afford your medications? yes   Do you have transportation to follow up appointments?  will provide until able to drive again. Do you need any new equipment at home? denies   Current equipment includes Wheelchair-manual, Rolling walker, 4 wheeled walker, shower chair, cane, BSC, elevated toilet seat. Do you have a living will or durable power of  for healthcare? yes               If yes do we have a copy on file? yes    Do you or your family have any questions or concerns we haven't already discussed? denies     Pt is being discharged home will use the [facility] program at discharge. Pt states therapist is coming out tomorrow and is to call in the morning with a time that they will be out. Juan Ramon Hernandez and writer present for rounding.

## 2020-10-29 NOTE — DISCHARGE SUMMARY
Physician Discharge Summary     Patient ID:  Rodolfo Grant  [id number]  19 y.o.  1955    Admit date: 10/28/2020    Discharge date and time: 10/29/20    Admitting Physician: Walker Vergara DO     Discharge Physician: Trish Joaquin DO    Admission Diagnoses: Osteoarthritis of left knee, unspecified osteoarthritis type [M17.12]    Discharge Diagnoses: same    Admission Condition: Stable    Discharged Condition: Stable    Indication for Admission: Left TKA recalcitrant pain, hx HTN/A. Fib    Hospital Course: Stable. Hgb 11.1    Consults: PT. OT. SS    Significant Diagnostic Studies: Xray RR shows stable TKA. CBC Hgb 11.1    Treatments: PT, IS    Discharge Exam:  VSS, stable ortho exam, able to SLR    Disposition: Stable    In process/preliminary results:  Outstanding Order Results     No orders found for last 30 day(s). Patient Instructions:   Current Discharge Medication List      CONTINUE these medications which have NOT CHANGED     Aspirin 325mg BID 4 weeks for blood clot prevention. Percocet 5 mg 1-2 po every 4 hours as needed for pain. Details   cetirizine (ZYRTEC) 10 MG tablet Take 10 mg by mouth daily      Glucosamine 500 MG CAPS Take 2,000 tablets by mouth daily      rosuvastatin (CRESTOR) 20 MG tablet Take 1 tablet by mouth nightly  Qty: 90 tablet, Refills: 1      amLODIPine (NORVASC) 10 MG tablet TAKE 1 TABLET BY MOUTH  DAILY  Qty: 90 tablet, Refills: 3      lisinopril (PRINIVIL;ZESTRIL) 5 MG tablet TAKE 1 TABLET BY MOUTH  DAILY  Qty: 90 tablet, Refills: 2    Associated Diagnoses: Essential hypertension      oxybutynin (DITROPAN-XL) 5 MG extended release tablet TAKE 1 TABLET BY MOUTH  DAILY  Qty: 90 tablet, Refills: 1      furosemide (LASIX) 20 MG tablet Take 1 tablet by mouth daily  Qty: 90 tablet, Refills: 1      omeprazole (PRILOSEC) 20 MG delayed release capsule TAKE 1 CAPSULE BY MOUTH  DAILY  Qty: 90 capsule, Refills: 1      !! NONFORMULARY Indications: Womens 1 a day 50 plus.       !!

## 2020-10-30 ENCOUNTER — CARE COORDINATION (OUTPATIENT)
Dept: CASE MANAGEMENT | Age: 65
End: 2020-10-30

## 2020-10-30 LAB — SURGICAL PATHOLOGY REPORT: NORMAL

## 2020-10-30 NOTE — CARE COORDINATION
[facility] Transitions Initial Follow Up Call- 1st attempt COVID risk follow up call       Call within 2 business days of discharge: Yes    Patient: Mary Davis Patient : 1955   MRN: <[id number]>  Reason for Admission: L TKA   Discharge Date: 10/29/20 RARS: Readmission Risk Score: 9      Last Discharge [facility]       Complaint Diagnosis Description Type Department Provider    10/28/20   Admission (Discharged) [address] MS Magaña DO Ignacia           Attempted to reach patient for initial transitional call.   VM left to return call to [phone]  No home care, no new meds    Follow Up  Future Appointments   Date Time Provider Melissa Gaitan   2020  9:30 AM JESSENIA Garcia - CNP nw pc [facility]       Doyle Aiken RN

## 2020-10-31 ENCOUNTER — CARE COORDINATION (OUTPATIENT)
Dept: CASE MANAGEMENT | Age: 65
End: 2020-10-31

## 2020-11-03 RX ORDER — ALPRAZOLAM 0.5 MG/1
0.5 TABLET ORAL 3 TIMES DAILY PRN
Qty: 90 TABLET | Refills: 0 | Status: SHIPPED | OUTPATIENT
Start: 2020-11-03 | End: 2020-12-15 | Stop reason: SDUPTHER

## 2020-11-17 ENCOUNTER — HOSPITAL ENCOUNTER (OUTPATIENT)
Dept: PHYSICAL THERAPY | Age: 65
Setting detail: THERAPIES SERIES
Discharge: HOME | End: 2020-11-17
Payer: MEDICARE

## 2020-11-17 ENCOUNTER — TELEPHONE (OUTPATIENT)
Dept: PRIMARY CARE CLINIC | Age: 65
End: 2020-11-17

## 2020-11-17 PROCEDURE — 97162 PT EVAL MOD COMPLEX 30 MIN: CPT

## 2020-11-17 ASSESSMENT — PAIN SCALES - GENERAL: PAINLEVEL_OUTOF10: 4

## 2020-11-17 NOTE — PROGRESS NOTES
Medicare Cap     [] Physical Therapy  [] Speech Therapy  [] Occupational therapy    *PT and Speech caps combine      $4967 Cap limit < kx modifier needed < $7176 requires pre-cert     Patient Name: Tevin Ruiz  YOB: 1955     Date of [address] Name $$$ charge Daily Charge YTD   Total $   11/17/20 EVAL 83.82 83.82 83.82

## 2020-11-17 NOTE — TELEPHONE ENCOUNTER
Michelle said [employer] called her and stated they have been trying to get ahold of the office. They have some questions before refilling her xanax prescription.     Health Maintenance   Topic Date Due    Diabetes screen  09/20/1993    Annual Wellness Visit (AWV)  03/14/2021    Lipid screen  05/20/2021    Breast cancer screen  10/21/2021    Potassium monitoring  10/29/2021    Creatinine monitoring  10/29/2021    Pneumococcal 65+ years Vaccine (2 of 2 - PPSV23) 12/07/2021    Colon cancer screen colonoscopy  07/01/2024    DTaP/Tdap/Td vaccine (3 - Td) 12/10/2028    DEXA (modify frequency per FRAX score)  Completed    Flu vaccine  Completed    Shingles Vaccine  Completed    Hepatitis C screen  Completed    Hepatitis A vaccine  Aged Out    Hepatitis B vaccine  Aged Out    Hib vaccine  Aged Out    Meningococcal (ACWY) vaccine  Aged Out             (applicable per patient's age: Cancer Screenings, Depression Screening, Fall Risk Screening, Immunizations)    LDL Cholesterol (mg/dL)   Date Value   05/20/2020 72     AST (U/L)   Date Value   05/20/2020 25     ALT (U/L)   Date Value   05/20/2020 17     BUN (mg/dL)   Date Value   10/29/2020 19      (goal A1C is < 7)   (goal LDL is <100) need 30-50% reduction from baseline     BP Readings from Last 3 Encounters:   10/29/20 (!) 152/84   10/28/20 (!) 97/52   09/02/20 120/86    (goal /80)      All Future Testing planned in [facility]:  Lab Frequency Next Occurrence   Initiate PAT Protocol Once 11/10/2020       Next Visit Date:  Future Appointments   Date Time Provider Melissa Gaitan   11/17/2020  1:45 PM Josué PT [facility]   12/4/2020  9:30 AM Leonides Jacinto APRN - CNP nw pc [facility]            Patient Active Problem List:     HTN (hypertension)     Arthritis     Chronic left shoulder pain     Anxiety     Bunion of great toe of right foot     History of adenomatous polyp of colon     Localized osteoarthritis of left knee     Osteoarthritis of left knee

## 2020-11-17 NOTE — PROGRESS NOTES
Phone: [phone]         Fax: [phone]                      Outpatient Physical Therapy                                                                      Evaluation    Date: 2020  Patient: Natalie Delarosa  : 1955  ACCT #: [de-identified]    Referring Practitioner: Dr. Jesi Campbell         Diagnosis: Left TKR    Treatment Diagnosis: Weakness  Onset Date: 10/28/20  PT Insurance Information: [facility]    Per Physician Order  Total # of Visits to Date: 1  No Show: 0  Canceled Appointment: 0     Subjective     Additional Pertinent Hx: Underwent elective  Left TKR 10/28/20. Recieved home care x 2 weeks and now presents for OP PT. Ambulating with cane in community and no device at home with exception of night she uses walker for stability. She takes pain pill q 7-8 hours. Return MD appt 20.   LEFs =   PMHx  skin CA, HTN  Pain Screening  Patient Currently in Pain: Yes  Pain Assessment  Pain Assessment: 0-10  Pain Level: 4     IADL History  Active : Yes(currently not driving following surgery)  Occupation: Retired  Leisure & Hobbies: Active with household tasks; goes to [employer]    Objective  Vision  Vision: Within Functional Limits  Hearing  Hearing: Within functional limits  Observation/Palpation  Posture: Good  Palpation: Minimal tenderness surrounding left knee  Edema: moderate edema     Strength RLE  Strength RLE: WNL  AROM RLE (degrees)  RLE AROM: WNL  Strength LLE  Strength LLE: WFL  Comment: Generally 4-4+/5  AROM LLE (degrees)  LLE AROM : Exceptions  L Knee Flexion 0-145: 100 deg seated knee flexion  L Knee Extension 0: 10 deg extension       AROM RLE (degrees)  RLE AROM: WNL  AROM LLE (degrees)  LLE AROM : Exceptions  L Knee Flexion 0-145: 100 deg seated knee flexion  L Knee Extension 0: 10 deg extension        PROM LLE (degrees)  LLE PROM: Exceptions  L Knee Flexion 0-145: 110 deg  L Knee Extension 0: 5 deg Assessment  Assessment: Patient underwent elective left TKR 10/28/20. Current PROM 5-110 deg;  AROM  deg. Patient would benefit from short term PT to address continued ROM and strengthening to return to normal fucntional activities and gait  Prognosis: Good  Decision Making: Medium Complexity    Clinical Presentation:  Evolving  The Following Comorbities will impact the patients progression and Plan of Care:   Previous Orthopedic Injury/Surgery       Activity Tolerance: Patient Tolerated treatment well    Education: PT POC; Reviewed hHEP from home care and added prolonged static extension stretch          Goals  Short term goals  Time Frame for Short term goals: 4 visits  Short term goal 1: Educate/upgrade home program for left knee/hip ROM and strengthening    Long term goals  Time Frame for Long term goals : 10 visits  Long term goal 1: PROM 120 deg seated knee flexion to allow reciprical stair amb  Long term goal 2: Ambulate without device on all surfaces  Long term goal 3: PROM 0 deg extension for normal gait pattern  Long term goal 4: Strength left knee   Long term goal 5: AROM 5-110 deg.     Patient's Goal:    Get back to activity    Timed Code Treatment Minutes: 0 Minutes  Total Treatment Time: 50     Time In: 9188  Time Out: 3192    [id number] JACQUELINE, PT Date: 11/17/2020

## 2020-11-17 NOTE — PROGRESS NOTES
Medicare Cap     [] Physical Therapy  [] Speech Therapy  [] Occupational therapy    *PT and Speech caps combine      $6345 Cap limit < kx modifier needed < $5080 requires pre-cert     Patient Name: Mary Davis  YOB: 1955     Date of [address] Name $$$ charge Daily Charge YTD   Total $   11/17/20 NESTOR

## 2020-11-17 NOTE — PLAN OF CARE
[facility]       Phone: [phone]   Date: 2020                      Outpatient Physical Therapy  Fax: [phone]    ACCT #: [de-identified]                     Plan of Care  [facility]#: [id number]  Patient: Lilian Blanco  : 1955    Referring Practitioner: Dr. Marques Ellison         Diagnosis: Left TKR  Onset Date: 10/28/20  Treatment Diagnosis: Weakness    Assessment: Patient underwent elective left TKR 10/28/20. Current PROM 5-110 deg;  AROM  deg. Patient would benefit from short term PT to address continued ROM and strengthening to return to normal fucntional activities and gait  Prognosis: Good    Treatment Plan :  Days: 2 times per week Weeks: 6 weeks      Patient Education/HEP and Therapeutic Exercise     Goals:  Time Frame for Short term goals: 4 visits  Short term goal 1: Educate/upgrade home program for left knee/hip ROM and strengthening    Time Frame for Long term goals : 10 visits  Long term goal 1: PROM 120 deg seated knee flexion to allow reciprical stair amb  Long term goal 2: Ambulate without device on all surfaces  Long term goal 3: PROM 0 deg extension for normal gait pattern  Long term goal 4: Strength left knee   Long term goal 5: AROM 5-110 deg. BERTO STRONG, PT   Date: 2020    ______________________________________ Date: 2020  Physician Signature  By signing above or cosigning electronically, I have reviewed this [address] [place] [address] and certify a need for medically necessary rehabilitation services.

## 2020-11-17 NOTE — TELEPHONE ENCOUNTER
I sent in note about xanax the reason they are restricting is due to her recent narcotic given for her knee replacement. Both medication are NOT recommended to be taken together due to risk of respiratory depression (or in event of high doses of these meds death). I have sent my response.

## 2020-11-19 ENCOUNTER — HOSPITAL ENCOUNTER (OUTPATIENT)
Dept: PHYSICAL THERAPY | Age: 65
Setting detail: THERAPIES SERIES
Discharge: HOME | End: 2020-11-19
Payer: MEDICARE

## 2020-11-19 PROCEDURE — 97110 THERAPEUTIC EXERCISES: CPT

## 2020-11-19 ASSESSMENT — PAIN SCALES - GENERAL: PAINLEVEL_OUTOF10: 3

## 2020-11-19 NOTE — PROGRESS NOTES
Phone: [facility]      Fax: [phone]                            Outpatient Physical Therapy                                                                            Daily Note    Date: 2020  Patient Name: Jason Leung        MRN: [id number]   ACCT#:  [de-identified]  : 1955  (77 y.o.)    Referring Practitioner: Dr. Anise Babinski         Diagnosis: Left TKR  Treatment Diagnosis: Weakness    Onset Date: 10/28/20  PT Insurance Information: [facility]    Per Physician Order  Total # of Visits to Date: 2  No Show: 0  Canceled Appointment: 0  Plan of Care/Certification Expiration Date: 20    Pre-Treatment Pain:  3/10     Assessment  Assessment: Patient with current ROM  degrees. Advised to continue with extension. provided extension stretches for HEP:  TKE, Hamstring stretch with over pressure, prone knee hangs for different options. Overal patient tolerated treatment well. Has fair + dynamic balance performing 6\" hurdles today. Continue to progress ROM, strength balance and gait. Chart Reviewed: Yes    Plan  Plan: Plan of care initiated    Exercises/Modalities/Manual:  See DocFlow Sheet    Education: extension stretches for HEP:  TKE, Hamstring stretch with over pressure, prone knee hangs     Goals  (Total # of Visits to Date: 2)   Short Term Goals - Time Frame for Short term goals: 4 visits  Short term goal 1: Educate/upgrade home program for left knee/hip ROM and strengthening                Long Term Goals - Time Frame for Long term goals : 10 visits  Long term goal 1: PROM 120 deg seated knee flexion to allow reciprical stair amb  Long term goal 2: Ambulate without device on all surfaces  Long term goal 3: PROM 0 deg extension for normal gait pattern  Long term goal 4: Strength left knee   Long term goal 5: AROM 5-110 deg.     Post Treatment Pain:  3/10    Time In: 920    Time Out : 1005        Timed Code Treatment Minutes: 45 Minutes  Total Treatment Time: [address] [facility], [place]     Date: 11/19/2020

## 2020-11-19 NOTE — PROGRESS NOTES
Medicare Cap     [] Physical Therapy  [] Speech Therapy  [] Occupational therapy    *PT and Speech caps combine      $8974 Cap limit < kx modifier needed < $5287 requires pre-cert     Patient Name: Willy Richardson  YOB: 1955     Date of [address] Name $$$ charge Daily Charge YTD   Total $   11/17/20 EVAL 83.82 83.82 83.82   11/19/20 Ex x 3 30.08 x 3 90.24 174.06

## 2020-11-20 RX ORDER — FUROSEMIDE 20 MG/1
20 TABLET ORAL DAILY
Qty: 90 TABLET | Refills: 3 | Status: SHIPPED | OUTPATIENT
Start: 2020-11-20 | End: 2021-12-03

## 2020-11-24 ENCOUNTER — HOSPITAL ENCOUNTER (OUTPATIENT)
Dept: PHYSICAL THERAPY | Age: 65
Setting detail: THERAPIES SERIES
Discharge: HOME | End: 2020-11-24
Payer: MEDICARE

## 2020-11-24 PROCEDURE — 97110 THERAPEUTIC EXERCISES: CPT

## 2020-11-24 ASSESSMENT — PAIN SCALES - GENERAL: PAINLEVEL_OUTOF10: 1

## 2020-11-24 NOTE — PROGRESS NOTES
Phone: [facility] Segura      Fax: [phone]                            Outpatient Physical Therapy                                                                            Daily Note    Date: 2020  Patient Name: Oumar Farris        MRN: [id number]   ACCT#:  [de-identified]  : 1955  (77 y.o.)    Referring Practitioner: Dr. Sajan Jama         Diagnosis: Left TKR  Treatment Diagnosis: Weakness    Onset Date: 10/28/20  PT Insurance Information: [facility]    Per Physician Order  Total # of Visits to Date: 3  No Show: 0  Canceled Appointment: 0  Plan of Care/Certification Expiration Date: 20    Pre-Treatment Pain:  1/10     Assessment  Assessment: Patient compliant with HEP and extension stretch; discussed prolonged light resisted stretch for optimal results. PROM 8-10 deg extension with anterior/medial discomfort. Chart Reviewed: Yes    Plan  Plan: Continue with current plan    Exercises/Modalities/Manual:  See DocFlow Sheet    Education:           Goals  (Total # of Visits to Date: 3)   Short Term Goals - Time Frame for Short term goals: 4 visits  Short term goal 1: Educate/upgrade home program for left knee/hip ROM and strengthening                Long Term Goals - Time Frame for Long term goals : 10 visits  Long term goal 1: PROM 120 deg seated knee flexion to allow reciprical stair amb  Long term goal 2: Ambulate without device on all surfaces  Long term goal 3: PROM 0 deg extension for normal gait pattern  Long term goal 4: Strength left knee   Long term goal 5: AROM 5-110 deg.     Post Treatment Pain:  1/10    Time In: 0950    Time Out : 1030        Timed Code Treatment Minutes: 40 Minutes  Total Treatment Time: 36 Minutes    BERTO STRONG PT     Date: 2020

## 2020-11-24 NOTE — PRE-CERTIFICATION NOTE
Medicare Cap     [] Physical Therapy  [] Speech Therapy  [] Occupational therapy    *PT and Speech caps combine      $1225 Cap limit < kx modifier needed < $7356 requires pre-cert     Patient Name: Amari Lacey  YOB: 1955     Date of [address] Name $$$ charge Daily Charge YTD   Total $   11/17/20 EVAL 83.82 83.82 83.82   11/19/20 Ex x 3 30.08 x 3 90.24 174.06   11/24/20 Ex x 3 30.08 x 3 90.24 264.30

## 2020-11-27 ENCOUNTER — HOSPITAL ENCOUNTER (OUTPATIENT)
Dept: PHYSICAL THERAPY | Age: 65
Setting detail: THERAPIES SERIES
Discharge: HOME | End: 2020-11-27
Payer: MEDICARE

## 2020-11-27 PROCEDURE — 97110 THERAPEUTIC EXERCISES: CPT

## 2020-11-27 ASSESSMENT — PAIN SCALES - GENERAL: PAINLEVEL_OUTOF10: 3

## 2020-11-27 NOTE — PRE-CERTIFICATION NOTE
Medicare Cap     [] Physical Therapy  [] Speech Therapy  [] Occupational therapy    *PT and Speech caps combine      $1940 Cap limit < kx modifier needed < $0776 requires pre-cert     Patient Name: Marizol Chavez  YOB: 1955     Date of [address] Name $$$ charge Daily Charge YTD   Total $   11/17/20 EVAL 83.82 83.82 83.82   11/19/20 Ex x 3 30.08 x 3 90.24 174.06   11/24/20 Ex x 3 30.08 x 3 90.24 264.30   11/27/20 Ex x 3 30.08 x 3 90.24 354.54

## 2020-11-27 NOTE — PRE-CERTIFICATION NOTE
Medicare Cap     [] Physical Therapy  [] Speech Therapy  [] Occupational therapy    *PT and Speech caps combine      $9250 Cap limit < kx modifier needed < $0526 requires pre-cert     Patient Name: Marizol Chavez  YOB: 1955     Date of [address] Name $$$ charge Daily Charge YTD   Total $   11/17/20 EVAL 83.82 83.82 83.82   11/19/20 Ex x 3 30.08 x 3 90.24 174.06   11/24/20 Ex x 3 30.08 x 3 90.24 264.30   11/27/20 Ex x 3

## 2020-11-27 NOTE — PROGRESS NOTES
Phone: [facility]      Fax: [phone]                            Outpatient Physical Therapy                                                                            Daily Note    Date: 2020  Patient Name: Radha Spicer        MRN: [id number]   ACCT#:  [de-identified]  : 1955  (77 y.o.)    Referring Practitioner: Dr. Chuck De La Rosa         Diagnosis: Left TKR  Treatment Diagnosis: Weakness    Onset Date: 10/28/20  PT Insurance Information: [facility]    Per Physician Order  Total # of Visits to Date: 4  No Show: 0  Canceled Appointment: 0  Plan of Care/Certification Expiration Date: 20    Pre-Treatment Pain:  3/10     Assessment  Assessment: Patient reports L knee pain is a 3/10 this morning. Completed exercises as outlined with good tolerance from patient. Educated patient on TKE to help improve knee ROM. L knee PROM ext = 9 deg. Following session patient reports pain is a 3-4/10. Chart Reviewed: Yes    Plan  Plan: Continue with current plan    Exercises/Modalities/Manual:  See DocFlow Sheet    Education:     Goals  (Total # of Visits to Date: 4)   Short Term Goals - Time Frame for Short term goals: 4 visits  Short term goal 1: Educate/upgrade home program for left knee/hip ROM and strengthening    Long Term Goals - Time Frame for Long term goals : 10 visits  Long term goal 1: PROM 120 deg seated knee flexion to allow reciprical stair amb  Long term goal 2: Ambulate without device on all surfaces  Long term goal 3: PROM 0 deg extension for normal gait pattern  Long term goal 4: Strength left knee   Long term goal 5: AROM 5-110 deg.     Post Treatment Pain:  3-4/10    Time In: 0906    Time Out : 0946   Timed Code Treatment Minutes: 40 Minutes  Total Treatment Time: [address] Minutes    Kathyleen Cowden, [place]     Date: 2020

## 2020-12-01 ENCOUNTER — HOSPITAL ENCOUNTER (OUTPATIENT)
Dept: PHYSICAL THERAPY | Age: 65
Setting detail: THERAPIES SERIES
Discharge: HOME | End: 2020-12-01
Payer: MEDICARE

## 2020-12-01 PROCEDURE — 97110 THERAPEUTIC EXERCISES: CPT

## 2020-12-01 ASSESSMENT — PAIN SCALES - GENERAL: PAINLEVEL_OUTOF10: 3

## 2020-12-01 NOTE — PRE-CERTIFICATION NOTE
Medicare Cap     [] Physical Therapy  [] Speech Therapy  [] Occupational therapy    *PT and Speech caps combine      $1940 Cap limit < kx modifier needed < $9233 requires pre-cert     Patient Name: Micha Gonzalez  YOB: 1955     Date of [address] Name $$$ charge Daily Charge YTD   Total $   11/17/20 EVAL 83.82 83.82 83.82   11/19/20 Ex x 3 30.08 x 3 90.24 174.06   11/24/20 Ex x 3 30.08 x 3 90.24 264.30   11/27/20 Ex x 3 30.08 x 3 90.24 354.54   12/1/20 Ex x 3 30.08 x 3 90.24 444.78

## 2020-12-01 NOTE — PROGRESS NOTES
Phone: Sanaz Segura      Fax: [phone]                            Outpatient Physical Therapy                                                                            Daily Note    Date: 2020  Patient Name: Yola Romo        MRN: [id number]   ACCT#:  [de-identified]  : 1955  (77 y.o.)    Referring Practitioner: Dr. Justine Arellano         Diagnosis: Left TKR  Treatment Diagnosis: Weakness    Onset Date: 10/28/20  PT Insurance Information: [facility]    Per Physician Order  Total # of Visits to Date: 5  No Show: 0  Canceled Appointment: 0  Plan of Care/Certification Expiration Date: 20    Pre-Treatment Pain:  3/10     Assessment  Assessment: Pt reports she has pain 3/10 with pain meds. She had gone >12 hours with no pain meds. She is now going up and down her stairs recipriclly. PROM to 5 deg FN. She is compliant with HEP. Chart Reviewed: Yes    Plan  Plan: Continue with current plan    Exercises/Modalities/Manual:  See DocFlow Sheet         Goals  (Total # of Visits to Date: 5)   Short Term Goals - Time Frame for Short term goals: 4 visits  Short term goal 1: Educate/upgrade home program for left knee/hip ROM and strengthening-met     Long Term Goals - Time Frame for Long term goals : 10 visits  Long term goal 1: PROM 120 deg seated knee flexion to allow reciprical stair amb  Long term goal 2: Ambulate without device on all surfaces  Long term goal 3: PROM 0 deg extension for normal gait pattern  Long term goal 4: Strength left knee   Long term goal 5: AROM 5-110 deg.     Post Treatment Pain:  6/10    Time In: 1032    Time Out : 1112        Timed Code Treatment Minutes: 40 Minutes  Total Treatment Time: 40 Minutes    Kenroy Grover  PTA   Date: 2020

## 2020-12-01 NOTE — PRE-CERTIFICATION NOTE
Medicare Cap     [] Physical Therapy  [] Speech Therapy  [] Occupational therapy    *PT and Speech caps combine      $1940 Cap limit < kx modifier needed < $7508 requires pre-cert     Patient Name: Marizol Chavez  YOB: 1955     Date of [address] Name $$$ charge Daily Charge YTD   Total $   11/17/20 EVAL 83.82 83.82 83.82   11/19/20 Ex x 3 30.08 x 3 90.24 174.06   11/24/20 Ex x 3 30.08 x 3 90.24 264.30   11/27/20 Ex x 3 30.08 x 3 90.24 354.54   12/1/20 Ex x 3

## 2020-12-02 ENCOUNTER — HOSPITAL ENCOUNTER (OUTPATIENT)
Age: 65
Discharge: HOME | End: 2020-12-04
Payer: MEDICARE

## 2020-12-02 ENCOUNTER — HOSPITAL ENCOUNTER (OUTPATIENT)
Dept: GENERAL RADIOLOGY | Age: 65
Discharge: HOME | End: 2020-12-04
Payer: MEDICARE

## 2020-12-02 PROCEDURE — 73564 X-RAY EXAM KNEE 4 OR MORE: CPT

## 2020-12-03 ENCOUNTER — HOSPITAL ENCOUNTER (OUTPATIENT)
Dept: PHYSICAL THERAPY | Age: 65
Setting detail: THERAPIES SERIES
Discharge: HOME | End: 2020-12-03
Payer: MEDICARE

## 2020-12-03 PROCEDURE — 97110 THERAPEUTIC EXERCISES: CPT

## 2020-12-03 ASSESSMENT — PAIN SCALES - GENERAL: PAINLEVEL_OUTOF10: 4

## 2020-12-03 NOTE — PRE-CERTIFICATION NOTE
Medicare Cap     [] Physical Therapy  [] Speech Therapy  [] Occupational therapy    *PT and Speech caps combine      $1940 Cap limit < kx modifier needed < $1026 requires pre-cert     Patient Name: Maegan Magaña  YOB: 1955     Date of [address] Name $$$ charge Daily Charge YTD   Total $   11/17/20 EVAL 83.82 83.82 83.82   11/19/20 Ex x 3 30.08 x 3 90.24 174.06   11/24/20 Ex x 3 30.08 x 3 90.24 264.30   11/27/20 Ex x 3 30.08 x 3 90.24 354.54   12/1/20 Ex x 3 30.08 x 3 90.24 444.78   12/3/20 Ex x 3

## 2020-12-03 NOTE — PROGRESS NOTES
Phone: [address]      Fax: [phone]                            Outpatient Physical Therapy                                                                            Daily Note    Date: 12/3/2020  Patient Name: Forrest Dai        MRN: [id number]   ACCT#:  [de-identified]  : 1955  (77 y.o.)    Referring Practitioner: Dr. Phillip Morris         Diagnosis: Left TKR  Treatment Diagnosis: Weakness    Onset Date: 10/28/20  PT Insurance Information: [facility]  Total # of Visits Approved: 10 Per Physician Order  Total # of Visits to Date: 6  No Show: 0  Canceled Appointment: 0  Plan of Care/Certification Expiration Date: 20    Pre-Treatment Pain:  4/10     Assessment  Assessment: Pt reports  pain 4/10. Performed ex as outlined . Progressed with tep up with opp hip flex and ball roll with aeromat. PROM to 6 deg FN today. Pt reports  is pleased with progress. Will cont. Chart Reviewed: Yes    Plan  Plan: Continue with current plan    Exercises/Modalities/Manual:  See DocFlow Sheet      Goals  (Total # of Visits to Date: 6)   Short Term Goals - Time Frame for Short term goals: 4 visits  Short term goal 1: Educate/upgrade home program for left knee/hip ROM and strengthening-met    Long Term Goals - Time Frame for Long term goals : 10 visits  Long term goal 1: PROM 120 deg seated knee flexion to allow reciprical stair amb  Long term goal 2: Ambulate without device on all surfaces  Long term goal 3: PROM 0 deg extension for normal gait pattern  Long term goal 4: Strength left knee   Long term goal 5: AROM 5-110 deg.     Post Treatment Pain:  4/10    Time In: 0945    Time Out : 1030        Timed and total 45 min  Jasmine Grover PTA    Date: 12/3/2020

## 2020-12-03 NOTE — PRE-CERTIFICATION NOTE
Medicare Cap     [] Physical Therapy  [] Speech Therapy  [] Occupational therapy    *PT and Speech caps combine      $1940 Cap limit < kx modifier needed < $3997 requires pre-cert     Patient Name: Yola Romo  YOB: 1955     Date of [address] Name $$$ charge Daily Charge YTD   Total $   11/17/20 EVAL 83.82 83.82 83.82   11/19/20 Ex x 3 30.08 x 3 90.24 174.06   11/24/20 Ex x 3 30.08 x 3 90.24 264.30   11/27/20 Ex x 3 30.08 x 3 90.24 354.54   12/1/20 Ex x 3 30.08 x 3 90.24 444.78   12/3/20 Ex x 3 30.08 x 3 90.24 535.02

## 2020-12-04 ENCOUNTER — OFFICE VISIT (OUTPATIENT)
Dept: PRIMARY CARE CLINIC | Age: 65
End: 2020-12-04
Payer: MEDICARE

## 2020-12-04 VITALS
TEMPERATURE: 97.1 F | HEART RATE: 65 BPM | DIASTOLIC BLOOD PRESSURE: 80 MMHG | WEIGHT: 169 LBS | SYSTOLIC BLOOD PRESSURE: 128 MMHG | OXYGEN SATURATION: 95 % | BODY MASS INDEX: 26.47 KG/M2

## 2020-12-04 PROCEDURE — 1036F TOBACCO NON-USER: CPT | Performed by: NURSE PRACTITIONER

## 2020-12-04 PROCEDURE — 4040F PNEUMOC VAC/ADMIN/RCVD: CPT | Performed by: NURSE PRACTITIONER

## 2020-12-04 PROCEDURE — G8427 DOCREV CUR MEDS BY ELIG CLIN: HCPCS | Performed by: NURSE PRACTITIONER

## 2020-12-04 PROCEDURE — G8399 PT W/DXA RESULTS DOCUMENT: HCPCS | Performed by: NURSE PRACTITIONER

## 2020-12-04 PROCEDURE — 1090F PRES/ABSN URINE INCON ASSESS: CPT | Performed by: NURSE PRACTITIONER

## 2020-12-04 PROCEDURE — 99214 OFFICE O/P EST MOD 30 MIN: CPT | Performed by: NURSE PRACTITIONER

## 2020-12-04 PROCEDURE — 3017F COLORECTAL CA SCREEN DOC REV: CPT | Performed by: NURSE PRACTITIONER

## 2020-12-04 PROCEDURE — G8482 FLU IMMUNIZE ORDER/ADMIN: HCPCS | Performed by: NURSE PRACTITIONER

## 2020-12-04 PROCEDURE — G8417 CALC BMI ABV UP PARAM F/U: HCPCS | Performed by: NURSE PRACTITIONER

## 2020-12-04 PROCEDURE — 1123F ACP DISCUSS/DSCN MKR DOCD: CPT | Performed by: NURSE PRACTITIONER

## 2020-12-04 RX ORDER — ROSUVASTATIN CALCIUM 20 MG/1
20 TABLET, COATED ORAL NIGHTLY
Qty: 90 TABLET | Refills: 1 | Status: SHIPPED | OUTPATIENT
Start: 2020-12-04 | End: 2021-03-31 | Stop reason: SDUPTHER

## 2020-12-04 RX ORDER — LISINOPRIL 5 MG/1
5 TABLET ORAL DAILY
Qty: 90 TABLET | Refills: 2 | Status: SHIPPED | OUTPATIENT
Start: 2020-12-04 | End: 2021-03-31 | Stop reason: SDUPTHER

## 2020-12-04 RX ORDER — OXYBUTYNIN CHLORIDE 5 MG/1
5 TABLET, EXTENDED RELEASE ORAL DAILY
Qty: 90 TABLET | Refills: 1 | Status: SHIPPED | OUTPATIENT
Start: 2020-12-04 | End: 2021-03-31 | Stop reason: SDUPTHER

## 2020-12-04 RX ORDER — HYDROCODONE BITARTRATE AND ACETAMINOPHEN 5; 325 MG/1; MG/1
1 TABLET ORAL EVERY 6 HOURS PRN
COMMUNITY
End: 2021-01-08 | Stop reason: ALTCHOICE

## 2020-12-04 RX ORDER — SERTRALINE HYDROCHLORIDE 25 MG/1
25 TABLET, FILM COATED ORAL DAILY
Qty: 30 TABLET | Refills: 0 | Status: SHIPPED | OUTPATIENT
Start: 2020-12-04 | End: 2020-12-15 | Stop reason: SINTOL

## 2020-12-04 ASSESSMENT — ENCOUNTER SYMPTOMS
SHORTNESS OF BREATH: 0
EYES NEGATIVE: 1
COUGH: 0
ABDOMINAL DISTENTION: 0

## 2020-12-04 NOTE — PATIENT INSTRUCTIONS
You may be receiving a survey from [employer] regarding your visit today. You may get this in the mail, through your MyChart or in your email. Please complete the survey to enable us to provide the highest quality of care to you and your family. If you cannot score us as very good ( 5 Stars) on any question, please feel free to call the office to discuss how we could have made your experience exceptional.     Thank You! Olivia Bran, APRN-ANEESH Espitia, REI Costa    Patient Education        sertraline  Pronunciation:  SER tra orly  Brand:  Zoloft  What is the most important information I should know about sertraline? You should not use sertraline if you also take pimozide, or if you are being treated with methylene blue injection. Do not use this medicine if you have used an MAO inhibitor in the past 14 days, such as isocarboxazid, linezolid, methylene blue injection, phenelzine, rasagiline, selegiline, or tranylcypromine. Some young people have thoughts about suicide when first taking an antidepressant. Stay alert to changes in your mood or symptoms. Report any new or worsening symptoms to your doctor. Seek medical attention right away if you have symptoms of serotonin syndrome, such as: agitation, hallucinations, fever, sweating, shivering, fast heart rate, muscle stiffness, twitching, loss of coordination, nausea, vomiting, or diarrhea. What is sertraline? Sertraline is an antidepressant in a group of drugs called selective serotonin reuptake inhibitors (SSRIs). Sertraline affects chemicals in the brain that may be unbalanced in people with depression, panic, anxiety, or obsessive-compulsive symptoms. Sertraline is used to treat depression, obsessive-compulsive disorder, panic disorder, anxiety disorders, post-traumatic stress disorder (PTSD), and premenstrual dysphoric disorder (PMDD). Sertraline may also be used for purposes not listed in this medication guide.   What should I discuss with my healthcare provider before taking sertraline? You should not use sertraline if you are allergic to it, or if you also take pimozide. Do not use the liquid form of sertraline  if you are taking disulfiram (Antabuse) or you could have a severe reaction to the disulfiram.  Do not take sertraline within 14 days before or 14 days after you take an MAO inhibitor. A dangerous drug interaction could occur. MAO inhibitors include isocarboxazid, linezolid, phenelzine, rasagiline, selegiline, and tranylcypromine. To make sure sertraline is safe for you, tell your doctor if you have ever had:  · heart disease, high blood pressure, or a stroke;  · liver or kidney disease;  · a seizure;  · bleeding problems, or if you take warfarin (Coumadin, Jantoven);  · bipolar disorder (manic depression); or  · low levels of sodium in your blood. Some medicines can interact with sertraline and cause a serious condition called serotonin syndrome. Be sure your doctor knows if you also take stimulant medicine, opioid medicine, herbal products, other antidepressants, or medicine for mental illness, Parkinson's disease, migraine headaches, serious infections, or prevention of nausea and vomiting. Ask your doctor before making any changes in how or when you take your medications. Some young people have thoughts about suicide when first taking an antidepressant. Your doctor should check your progress at regular visits. Your family or other caregivers should also be alert to changes in your mood or symptoms. Taking an SSRI antidepressant during pregnancy may cause serious lung problems or other complications in the baby. However, you may have a relapse of depression if you stop taking your antidepressant. Tell your doctor right away if you become pregnant. Do not start or stop taking this medicine during pregnancy without your doctor's advice.   It is not known whether sertraline passes into breast milk or if it could harm a nursing baby. Tell your doctor if you are breast-feeding a baby. Do not give sertraline to anyone younger than 25years old without the advice of a doctor. Sertraline is FDA-approved for children with obsessive-compulsive disorder (OCD). It is not approved for treating depression in children. How should I take sertraline? Follow all directions on your prescription label. Your doctor may occasionally change your dose. Do not take this medicine in larger or smaller amounts or for longer than recommended. Sertraline may be taken with or without food. Try to take the medicine at the same time each day. The liquid (oral concentrate) form of sertraline must be diluted before you take it. To be sure you get the correct dose, measure the liquid with the medicine dropper provided. Mix the dose with 4 ounces (one-half cup) of water, ginger ale, lemon/lime soda, lemonade, or orange juice. Do not use any other liquids to dilute the medicine. Stir this mixture and drink all of it right away. To make sure you get the entire dose, add a little more water to the same glass, swirl gently and drink right away. This medicine can cause you to have a false positive drug screening test. If you provide a urine sample for drug screening, tell the laboratory staff that you are taking sertraline. It may take up to 4 weeks before your symptoms improve. Keep using the medication as directed and tell your doctor if your symptoms do not improve. Do not stop using sertraline suddenly, or you could have unpleasant withdrawal symptoms. Ask your doctor how to safely stop using sertraline. Store at room temperature away from moisture and heat. What happens if I miss a dose? Take the missed dose as soon as you remember. Skip the missed dose if it is almost time for your next scheduled dose. Do not take extra medicine to make up the missed dose. What happens if I overdose?   Seek emergency medical attention or call the Poison Help line at 1-597.487.4487. What should I avoid while taking sertraline? Do not drink alcohol. Ask your doctor before taking a nonsteroidal anti-inflammatory drug (NSAID) for pain, arthritis, fever, or swelling. This includes aspirin, ibuprofen (Advil, Motrin), naproxen (Aleve), celecoxib (Celebrex), diclofenac, indomethacin, meloxicam, and others. Using an NSAID with sertraline may cause you to bruise or bleed easily. This medication may impair your thinking or reactions. Be careful if you drive or do anything that requires you to be alert. What are the possible side effects of sertraline? Get emergency medical help if you have signs of an allergic reaction: skin rash or hives (with or without fever or joint pain); difficulty breathing; swelling of your face, lips, tongue, or throat. Report any new or worsening symptoms to your doctor, such as: mood or behavior changes, anxiety, panic attacks, trouble sleeping, or if you feel impulsive, irritable, agitated, hostile, aggressive, restless, hyperactive (mentally or physically), more depressed, or have thoughts about suicide or hurting yourself. Call your doctor at once if you have:  · a seizure (convulsions);  · blurred vision, tunnel vision, eye pain or swelling;  · low levels of sodium in the body --headache, confusion, memory problems, severe weakness, feeling unsteady; or  · manic episodes --racing thoughts, increased energy, unusual risk-taking behavior, extreme happiness, being irritable or talkative. Seek medical attention right away if you have symptoms of serotonin syndrome, such as: agitation, hallucinations, fever, sweating, shivering, fast heart rate, muscle stiffness, twitching, loss of coordination, nausea, vomiting, or diarrhea.   Common side effects may include:  · drowsiness, tiredness, feeling anxious or agitated;  · indigestion, nausea, diarrhea, loss of appetite;  · sweating;  · tremors or shaking;  · sleep problems (insomnia); or  · decreased sex drive, impotence, or difficulty having an orgasm. This is not a complete list of side effects and others may occur. Call your doctor for medical advice about side effects. You may report side effects to FDA at 1-216-TXT-6787. What other drugs will affect sertraline? Taking sertraline with other drugs that make you sleepy can worsen this effect. Ask your doctor before taking a sleeping pill, narcotic medication, muscle relaxer, or medicine for anxiety, depression, or seizures. Other drugs may interact with sertraline, including prescription and over-the-counter medicines, vitamins, and herbal products. Tell your doctor about all your current medicines and any medicine you start or stop using. Where can I get more information? Your pharmacist can provide more information about sertraline. Remember, keep this and all other medicines out of the reach of children, never share your medicines with others, and use this medication only for the indication prescribed. Every effort has been made to ensure that the information provided by [address] is accurate, up-to-date, and complete, but no guarantee is made to that effect. Drug information contained herein may be time sensitive. [facility] information has been compiled for use by healthcare practitioners and consumers in the [place] and therefore [facility] does not warrant that uses outside of the [place] are appropriate, unless specifically indicated otherwise. [facility]'s drug information does not endorse drugs, diagnose patients or recommend therapy. [facility] drug information is an informational resource designed to assist licensed healthcare practitioners in caring for their patients and/or to serve consumers viewing this service as a supplement to, and not a substitute for, the expertise, skill, knowledge and judgment of healthcare practitioners.  The absence of a warning for a given drug or drug combination in no way should be construed to indicate that the drug or drug combination is safe, effective or appropriate for any given patient. [facility] does not assume any responsibility for any aspect of healthcare administered with the aid of information [facility] provides. The information contained herein is not intended to cover all possible uses, directions, precautions, warnings, drug interactions, allergic reactions, or adverse effects. If you have questions about the drugs you are taking, check with your doctor, nurse or pharmacist.  Copyright 6898-3617 [address]. Version: 21.01. Revision date: 8/9/2017. Care instructions adapted under license by [facility] ([facility]). If you have questions about a medical condition or this instruction, always ask your healthcare professional. [place] [address] any warranty or liability for your use of this information. Patient Education        Anxiety Disorder: Care Instructions  Your Care Instructions     Anxiety is a normal reaction to stress. Difficult situations can cause you to have symptoms such as sweaty palms and a nervous feeling. In an anxiety disorder, the symptoms are far more severe. Constant worry, muscle tension, trouble sleeping, nausea and diarrhea, and other symptoms can make normal daily activities difficult or impossible. These symptoms may occur for no reason, and they can affect your work, school, or social life. Medicines, counseling, and self-care can all help. Follow-up care is a key part of your treatment and safety. Be sure to make and go to all appointments, and call your doctor if you are having problems. It's also a good idea to know your test results and keep a list of the medicines you take. How can you care for yourself at home? · Take medicines exactly as directed. Call your doctor if you think you are having a problem with your medicine. · Go to your counseling sessions and follow-up appointments. · Recognize and accept your anxiety.  Then, when you are in a situation that makes you anxious, say to yourself, \"This is not an emergency. I feel uncomfortable, but I am not in danger. I can keep going even if I feel anxious. \"  · Be kind to your body:  ? Relieve tension with exercise or a massage. ? Get enough rest.  ? Avoid alcohol, caffeine, nicotine, and illegal drugs. They can increase your anxiety level and cause sleep problems. ? Learn and do relaxation techniques. See below for more about these techniques. · Engage your mind. Get out and do something you enjoy. Go to a [employer] movie, or take a walk or hike. Plan your day. Having too much or too little to do can make you anxious. · Keep a record of your symptoms. Discuss your fears with a good friend or family member, or join a support group for people with similar problems. Talking to others sometimes relieves stress. · Get involved in social groups, or volunteer to help others. Being alone sometimes makes things seem worse than they are. · Get at least 30 minutes of exercise on most days of the week to relieve stress. Walking is a good choice. You also may want to do other activities, such as running, swimming, cycling, or playing tennis or team sports. Relaxation techniques  Do relaxation exercises 10 to 20 minutes a day. You can play soothing, relaxing music while you do them, if you wish. · Tell others in your house that you are going to do your relaxation exercises. Ask them not to disturb you. · Find a comfortable place, away from all distractions and noise. · Lie down on your back, or sit with your back straight. · Focus on your breathing. Make it slow and steady. · Breathe in through your nose. Breathe out through either your nose or mouth. · Breathe deeply, filling up the area between your navel and your rib cage. Breathe so that your belly goes up and down. · Do not hold your breath. · Breathe like this for 5 to 10 minutes. Notice the feeling of calmness throughout your whole body.   As you continue to breathe slowly and deeply, relax by doing the following for another 5 to 10 minutes:  · Tighten and relax each muscle group in your body. You can begin at your toes and work your way up to your head. · Imagine your muscle groups relaxing and becoming heavy. · Empty your mind of all thoughts. · Let yourself relax more and more deeply. · Become aware of the state of calmness that surrounds you. · When your relaxation time is over, you can bring yourself back to alertness by moving your fingers and toes and then your hands and feet and then stretching and moving your entire body. Sometimes people fall asleep during relaxation, but they usually wake up shortly afterward. · Always give yourself time to return to full alertness before you drive a car or do anything that might cause an accident if you are not fully alert. Never play a relaxation tape while you drive a car. When should you call for help? Call 911 anytime you think you may need emergency care. For example, call if:    · You feel you cannot stop from hurting yourself or someone else. Keep the numbers for these national suicide hotlines: 0-911-799-TALK (9-442-319-315.111.5336) and 6-199-GUOJINJ (2-864.987.1805). If you or someone you know talks about suicide or feeling hopeless, get help right away. Watch closely for changes in your health, and be sure to contact your doctor if:    · You have anxiety or fear that affects your life.     · You have symptoms of anxiety that are new or different from those you had before. Where can you learn more? Go to https://[employer]. org and sign in to your [employer] account. Enter P754 in the [facility] box to learn more about \"Anxiety Disorder: Care Instructions. \"     If you do not have an account, please click on the \"Sign Up Now\" link. Current as of: January 31, 2020               Content Version: 12.6  © 1144-1690 [employer].    Care instructions adapted

## 2020-12-04 NOTE — PROGRESS NOTES
2020     Anastacio Hammond (:  1955) is a 77 y.o. female, here for evaluation of the following medical concerns:    HPI     Hx Left total knee replacement 10/28/2020 doing well feels much improved. Slight swelling and warmth still present. Patient with recent pain pill for Left knee replacement  Leg feels so much better, ambulating independently. Healing yet. Pt off xanax medication due to still taking narcotic from surgery. Reviewed with patient risks associated with narcotic and benzo which is CNS depression and risk death due to respiratory depression. Pt wants to get back on xanax states \"i'm bitchy in the morning\" encouraged to trial SSRI type med best to treat anxiety, post menopausal.   Pt agreeable to start zoloft. Will recheck in 1 month. May consider xanax only prn in future. Treatment Adherence:   Medication compliance:  compliant all of the time  Diet compliance:  compliant all of the time  Weight trend: decreasing  Current exercise: walks 5 time(s) per day  Barriers: stress and time constraints    Wt Readings from Last 3 Encounters:   20 169 lb (76.7 kg)   10/28/20 174 lb 1.6 oz (79 kg)   20 175 lb (79.4 kg)       Hypertension:  Home blood pressure monitoring: Yes - . Patient denies chest pain, shortness of breath, headache, lightheadedness, blurred vision, peripheral edema, palpitations and dry cough. Antihypertensive medication side effects: no medication side effects noted. Use of agents associated with hypertension: none. Sodium (mmol/L)   Date Value   10/29/2020 137    BUN (mg/dL)   Date Value   10/29/2020 19    Glucose (mg/dL)   Date Value   10/29/2020 142 (H)      Potassium (mmol/L)   Date Value   10/29/2020 4.3    CREATININE (mg/dL)   Date Value   10/29/2020 0.84         Hyperlipidemia:  No new myalgias or GI upset on  rosuvastatin (Crestor).      Lab Results   Component Value Date    CHOL 157 2020 TRIG 79 05/20/2020    HDL 69 05/20/2020     Lab Results   Component Value Date    ALT 17 05/20/2020    AST 25 05/20/2020          Review of Systems   Constitutional: Negative for activity change, chills and fever. HENT: Negative for congestion. Eyes: Negative. Respiratory: Negative for cough and shortness of breath. Cardiovascular: Negative for chest pain. Gastrointestinal: Negative for abdominal distention. Endocrine: Negative for cold intolerance and heat intolerance. Genitourinary: Negative for difficulty urinating. Musculoskeletal: Positive for arthralgias. Skin: Negative for rash. Neurological: Negative for dizziness. Prior to Visit Medications    Medication Sig Taking? Authorizing Provider   HYDROcodone-acetaminophen (NORCO) 5-325 MG per tablet Take 1 tablet by mouth every 6 hours as needed for Pain.  Weaning down Yes Historical Provider, MD   oxybutynin (DITROPAN-XL) 5 MG extended release tablet Take 1 tablet by mouth daily Yes JESSENIA Rich CNP   rosuvastatin (CRESTOR) 20 MG tablet Take 1 tablet by mouth nightly Yes JESSENIA Rich CNP   lisinopril (PRINIVIL;ZESTRIL) 5 MG tablet Take 1 tablet by mouth daily Yes JESSENIA Rich CNP   sertraline (ZOLOFT) 25 MG tablet Take 1 tablet by mouth daily For anxiety Yes JESSENIA Rich CNP   furosemide (LASIX) 20 MG tablet TAKE 1 TABLET BY MOUTH  DAILY Yes JESSENIA Rich CNP   cetirizine (ZYRTEC) 10 MG tablet Take 10 mg by mouth daily Yes Historical Provider, MD   Glucosamine 500 MG CAPS Take 2,000 tablets by mouth daily Yes Historical Provider, MD   amLODIPine (NORVASC) 10 MG tablet TAKE 1 TABLET BY MOUTH  DAILY Yes JESSENIA Rich CNP   omeprazole (PRILOSEC) 20 MG delayed release capsule TAKE 1 CAPSULE BY MOUTH  DAILY Yes JESSENIA Rich CNP   Coenzyme Q10 (CO Q-10) 200 MG CAPS Take 1 capsule by mouth Yes Historical Provider, MD   Elastic Bandages & Supports ([address]) [facility] 1 each by Does not apply route daily as needed (varicose veins with leg swelling) 20-30 mmHG full length  Patient taking differently: 1 each by Does not apply route daily as needed (varicose veins with leg swelling) 20-30 mmHG full length, not wearing Yes Jaycee Corbin, APRN - CNP   NONFORMULARY Indications: Womens 1 a day 50 plus. Yes Historical Provider, MD   NONFORMULARY Indications: [address] hair skin and nails  2500 mcg 1 a day. Yes Historical Provider, MD   vitamin B-12 (CYANOCOBALAMIN) 500 MCG tablet Take 500 mcg by mouth daily Yes Historical Provider, MD   Polypodium Leucotomos (HELIOCARE PO) Take 1 tablet by mouth daily Yes Historical Provider, MD   MIRVASO 0.33 % GEL  Yes Historical Provider, MD   Magnesium 400 MG CAPS Take  by mouth daily. Yes Historical Provider, MD   fish oil-omega-3 fatty acids 1000 MG capsule Take 1 g by mouth 2 times daily. Yes Historical Provider, MD   vitamin D (CHOLECALCIFEROL) 400 UNITS TABS tablet Take 5,000 Units by mouth daily  Yes Historical Provider, MD   calcium-vitamin D (OSCAL) 250-125 MG-UNIT per tablet Take 1 tablet by mouth daily Vit D and Vit K 750 mg daily, takes 3 a day now Yes Historical Provider, MD   Ascorbic Acid (VITAMIN C) 500 MG tablet Take 500 mg by mouth daily. Yes Historical Provider, MD        Social History     Tobacco Use    Smoking status: Former Smoker     Packs/day: 0.50     Years: 23.00     Pack years: 11.50     Types: Cigarettes     Start date: 9/18/1994     Last attempt to quit: 9/18/2017     Years since quitting: 3.2    Smokeless tobacco: Never Used   Substance Use Topics    Alcohol use:  Yes     Alcohol/week: 5.0 standard drinks     Types: 5 Shots of liquor per week     Comment: occasional         Vitals:    12/04/20 0924   BP: 128/80   Site: Left Upper Arm   Position: Sitting   Cuff Size: Medium Adult   Pulse: 65   Temp: 97.1 °F (36.2 °C)   TempSrc: Infrared   SpO2: 95%   Weight: 169 lb (76.7 kg)     Estimated body mass index is 26.47 kg/m² as calculated from the following:    Height as of 10/28/20: 5' 7\" (1.702 m). Weight as of this encounter: 169 lb (76.7 kg). Physical Exam  Vitals signs and nursing note reviewed. Constitutional:       General: She is not in acute distress. Appearance: Normal appearance. She is well-developed. HENT:      Head: Normocephalic and atraumatic. Right Ear: Tympanic membrane and external ear normal.      Left Ear: Tympanic membrane and external ear normal.      Nose: No congestion. Mouth/Throat:      Mouth: Mucous membranes are moist.   Eyes:      General: No scleral icterus. Pupils: Pupils are equal, round, and reactive to light. Neck:      Musculoskeletal: Normal range of motion and neck supple. Vascular: No carotid bruit (neg luz elena). Cardiovascular:      Rate and Rhythm: Normal rate and regular rhythm. Heart sounds: Normal heart sounds. No murmur. Pulmonary:      Effort: Pulmonary effort is normal. No respiratory distress. Breath sounds: Normal breath sounds. No wheezing. Abdominal:      Palpations: Abdomen is soft. Tenderness: There is no abdominal tenderness. Musculoskeletal: Normal range of motion. General: No tenderness. Right lower leg: No edema. Left lower leg: No edema. Lymphadenopathy:      Cervical: No cervical adenopathy. Skin:     General: Skin is warm and dry. Neurological:      Mental Status: She is alert and oriented to person, place, and time. Deep Tendon Reflexes: Reflexes normal.   Psychiatric:         Behavior: Behavior normal.         Thought Content: Thought content normal.         Judgment: Judgment normal.         ASSESSMENT/PLAN:  1. Essential hypertension  Stable and controlled  - lisinopril (PRINIVIL;ZESTRIL) 5 MG tablet; Take 1 tablet by mouth daily  Dispense: 90 tablet; Refill: 2    2. Anxiety  Start zoloft    3.  Hypercholesterolemia  On crestor  Lab Results   Component Value Date    LDLCHOLESTEROL 67 05/20/2020         4. Hx of discoid lupus erythematosus  stable    5. Encounter for screening mammogram for breast cancer  Schedule. - [facility] MARGRET DIGITAL SCREEN BILATERAL; Future      Return in about 3 months (around 3/4/2021) for anxiety . An electronic signature was used to authenticate this note.     --JESSENIA Garcia - CNP on 12/6/2020 at 9:20 PM

## 2020-12-08 ENCOUNTER — HOSPITAL ENCOUNTER (OUTPATIENT)
Dept: PHYSICAL THERAPY | Age: 65
Setting detail: THERAPIES SERIES
Discharge: HOME | End: 2020-12-08
Payer: MEDICARE

## 2020-12-08 PROCEDURE — 97110 THERAPEUTIC EXERCISES: CPT

## 2020-12-08 ASSESSMENT — PAIN SCALES - GENERAL: PAINLEVEL_OUTOF10: 2

## 2020-12-08 NOTE — PROGRESS NOTES
Phone: Sanaz Segura      Fax: [phone]                            Outpatient Physical Therapy                                                                            Daily Note    Date: 2020  Patient Name: Suresh Haskins        MRN: [id number]   ACCT#:  [de-identified]  : 1955  (77 y.o.)    Referring Practitioner: Dr. London Keith         Diagnosis: Left TKR  Treatment Diagnosis: Weakness    Onset Date: 10/28/20  PT Insurance Information: [facility]  Total # of Visits Approved: 10 Per Physician Order  Total # of Visits to Date: 7  No Show: 0  Canceled Appointment: 0  Plan of Care/Certification Expiration Date: 20    Pre-Treatment Pain:  2/10     Assessment  Assessment: Patient rates pain 2/10 this date. Notes improving mobility at home; saw chiropractor last week noting decreased pain overall. Remains limited with terminal knee extension during gait and also passively; 5-6 deg in supine however able to achieve 2-3 deg with long sitting. Chart Reviewed: Yes    Plan  Plan: Continue with current plan    Exercises/Modalities/Manual:  See DocFlow Sheet    Education:           Goals  (Total # of Visits to Date: 7)   Short Term Goals - Time Frame for Short term goals: 4 visits  Short term goal 1: Educate/upgrade home program for left knee/hip ROM and strengthening-met                Long Term Goals - Time Frame for Long term goals : 10 visits  Long term goal 1: PROM 120 deg seated knee flexion to allow reciprical stair amb  Long term goal 2: Ambulate without device on all surfaces  Long term goal 3: PROM 0 deg extension for normal gait pattern  Long term goal 4: Strength left knee   Long term goal 5: AROM 5-110 deg.     Post Treatment Pain:  2/10    Time In: 0915    Time Out : 1000        Timed Code Treatment Minutes: 45 Minutes  Total Treatment Time: 39 Minutes    BERTO STRONG PT     Date: 2020

## 2020-12-08 NOTE — PRE-CERTIFICATION NOTE
Medicare Cap     [] Physical Therapy  [] Speech Therapy  [] Occupational therapy    *PT and Speech caps combine      $1940 Cap limit < kx modifier needed < $7275 requires pre-cert     Patient Name: Suresh Haskins  YOB: 1955     Date of [address] Name $$$ charge Daily Charge YTD   Total $   11/17/20 EVAL 83.82 83.82 83.82   11/19/20 Ex x 3 30.08 x 3 90.24 174.06   11/24/20 Ex x 3 30.08 x 3 90.24 264.30   11/27/20 Ex x 3 30.08 x 3 90.24 354.54   12/1/20 Ex x 3 30.08 x 3 90.24 444.78   12/3/20 Ex x 3 30.08 x 3 90.24 535.02   12/8/20 Ex x 3 30.08 x 3 90.24  625.26

## 2020-12-11 ENCOUNTER — HOSPITAL ENCOUNTER (OUTPATIENT)
Dept: PHYSICAL THERAPY | Age: 65
Setting detail: THERAPIES SERIES
Discharge: HOME | End: 2020-12-11
Payer: MEDICARE

## 2020-12-11 PROCEDURE — 97110 THERAPEUTIC EXERCISES: CPT

## 2020-12-11 ASSESSMENT — PAIN SCALES - GENERAL: PAINLEVEL_OUTOF10: 1

## 2020-12-11 NOTE — PROGRESS NOTES
Phone: Sanaz Segura      Fax: [phone]                            Outpatient Physical Therapy                                                                            Daily Note    Date: 2020  Patient Name: Lilian Blanco        MRN: [id number]   ACCT#:  [de-identified]  : 1955  (77 y.o.)    Referring Practitioner: Dr. Marques Ellison         Diagnosis: Left TKR  Treatment Diagnosis: Weakness    Onset Date: 10/28/20  PT Insurance Information: [facility]  Total # of Visits Approved: 10 Per Physician Order  Total # of Visits to Date: 8  No Show: 0  Canceled Appointment: 0  Plan of Care/Certification Expiration Date: 20    Pre-Treatment Pain:  1/10     Assessment  Assessment: Patient states L knee pain is a /10 ache this morning. She states completing her own short workout session this morning. Patient states she hasn't taken a pain pill since Wednesday. Continued with strengthening and ROM exercies as outlined. L knee PROM ext = 7 deg. Plan to continue x2 visits and then D/C to HEP. Chart Reviewed: Yes    Plan  Plan: Continue with current plan    Exercises/Modalities/Manual:  See DocFlow Sheet    Education:     Goals  (Total # of Visits to Date: 8)   Short Term Goals - Time Frame for Short term goals: 4 visits  Short term goal 1: Educate/upgrade home program for left knee/hip ROM and strengthening-met    Long Term Goals - Time Frame for Long term goals : 10 visits  Long term goal 1: PROM 120 deg seated knee flexion to allow reciprical stair amb  Long term goal 2: Ambulate without device on all surfaces  Long term goal 3: PROM 0 deg extension for normal gait pattern  Long term goal 4: Strength left knee   Long term goal 5: AROM 5-110 deg.     Post Treatment Pain:  2/10    Time In: 0900    Time Out : 0940   Timed Code Treatment Minutes: 40 Minutes  Total Treatment Time: 36 Minutes    Miguel Ángel Negro     Date: 2020

## 2020-12-11 NOTE — PRE-CERTIFICATION NOTE
Medicare Cap     [] Physical Therapy  [] Speech Therapy  [] Occupational therapy    *PT and Speech caps combine      $1940 Cap limit < kx modifier needed < $6793 requires pre-cert     Patient Name: Natalie Delarosa  YOB: 1955     Date of [address] Name $$$ charge Daily Charge YTD   Total $   11/17/20 EVAL 83.82 83.82 83.82   11/19/20 Ex x 3 30.08 x 3 90.24 174.06   11/24/20 Ex x 3 30.08 x 3 90.24 264.30   11/27/20 Ex x 3 30.08 x 3 90.24 354.54   12/1/20 Ex x 3 30.08 x 3 90.24 444.78   12/3/20 Ex x 3 30.08 x 3 90.24 535.02   12/8/20 Ex x 3 30.08 x 3 90.24  625.26   12/11/20 Ex x 3

## 2020-12-11 NOTE — PRE-CERTIFICATION NOTE
Medicare Cap     [] Physical Therapy  [] Speech Therapy  [] Occupational therapy    *PT and Speech caps combine      $1940 Cap limit < kx modifier needed < $9179 requires pre-cert     Patient Name: Bk Wallace  YOB: 1955     Date of [address] Name $$$ charge Daily Charge YTD   Total $   11/17/20 EVAL 83.82 83.82 83.82   11/19/20 Ex x 3 30.08 x 3 90.24 174.06   11/24/20 Ex x 3 30.08 x 3 90.24 264.30   11/27/20 Ex x 3 30.08 x 3 90.24 354.54   12/1/20 Ex x 3 30.08 x 3 90.24 444.78   12/3/20 Ex x 3 30.08 x 3 90.24 535.02   12/8/20 Ex x 3 30.08 x 3 90.24  625.26   12/11/20 Ex x 3 30.08 x 3 90.24 715.50

## 2020-12-14 ENCOUNTER — TELEPHONE (OUTPATIENT)
Dept: PRIMARY CARE CLINIC | Age: 65
End: 2020-12-14

## 2020-12-14 NOTE — TELEPHONE ENCOUNTER
Pt called in because she has stopped taking the zoloft medication due to having some side effects of insomnia, loss of appetite, weight loss. Since stopping the medication she is sleeping better and can eat. Asking if she can be on something else or possibly get back on her xanax since she is not taking the pain pills anymore. Please advise.     Health Maintenance   Topic Date Due    Diabetes screen  09/20/1993    Annual Wellness Visit (AWV)  03/14/2021    Lipid screen  05/20/2021    Breast cancer screen  10/21/2021    Potassium monitoring  10/29/2021    Creatinine monitoring  10/29/2021    Pneumococcal 65+ years Vaccine (2 of 2 - PPSV23) 12/07/2021    Colon cancer screen colonoscopy  07/01/2024    DTaP/Tdap/Td vaccine (3 - Td) 12/10/2028    DEXA (modify frequency per FRAX score)  Completed    Flu vaccine  Completed    Shingles Vaccine  Completed    Hepatitis C screen  Completed    Hepatitis A vaccine  Aged Out    Hepatitis B vaccine  Aged Out    Hib vaccine  Aged Out    Meningococcal (ACWY) vaccine  Aged Out             (applicable per patient's age: Cancer Screenings, Depression Screening, Fall Risk Screening, Immunizations)    LDL Cholesterol (mg/dL)   Date Value   05/20/2020 72     AST (U/L)   Date Value   05/20/2020 25     ALT (U/L)   Date Value   05/20/2020 17     BUN (mg/dL)   Date Value   10/29/2020 19      (goal A1C is < 7)   (goal LDL is <100) need 30-50% reduction from baseline     BP Readings from Last 3 Encounters:   12/04/20 128/80   10/29/20 (!) 152/84   10/28/20 (!) 97/52    (goal /80)      All Future Testing planned in [facility]:  Lab Frequency Next Occurrence   Initiate PAT Protocol Once 11/10/2020   FADI MARGRET DIGITAL SCREEN BILATERAL Once 12/04/2020       Next Visit Date:  Future Appointments   Date Time Provider Melissa Gaitan   12/15/2020  9:00 AM Judi Louise [facility] PT Arvm[facility]inda Esteves   12/18/2020  9:30 AM Fredy Kelly PT [facility] PT Arvella Pali   3/3/2021 10:00 AM Pia Allen Marcos, JESSENIA - CNP n[facility] pc [facility]            Patient Active Problem List:     HTN (hypertension)     Arthritis     Chronic left shoulder pain     Anxiety     Bunion of great toe of right foot     History of adenomatous polyp of colon     Localized osteoarthritis of left knee     Osteoarthritis of left knee

## 2020-12-15 ENCOUNTER — HOSPITAL ENCOUNTER (OUTPATIENT)
Dept: PHYSICAL THERAPY | Age: 65
Setting detail: THERAPIES SERIES
Discharge: HOME | End: 2020-12-15
Payer: MEDICARE

## 2020-12-15 PROCEDURE — 97110 THERAPEUTIC EXERCISES: CPT

## 2020-12-15 NOTE — PRE-CERTIFICATION NOTE
Medicare Cap     [] Physical Therapy  [] Speech Therapy  [] Occupational therapy    *PT and Speech caps combine      $1940 Cap limit < kx modifier needed < $3321 requires pre-cert     Patient Name: Suresh Haskins  YOB: 1955     Date of [address] Name $$$ charge Daily Charge YTD   Total $   11/17/20 EVAL 83.82 83.82 83.82   11/19/20 Ex x 3 30.08 x 3 90.24 174.06   11/24/20 Ex x 3 30.08 x 3 90.24 264.30   11/27/20 Ex x 3 30.08 x 3 90.24 354.54   12/1/20 Ex x 3 30.08 x 3 90.24 444.78   12/3/20 Ex x 3 30.08 x 3 90.24 535.02   12/8/20 Ex x 3 30.08 x 3 90.24  625.26   12/11/20 Ex x 3 30.08 x 3 90.24 715.50   12/15/20 Ex x 3

## 2020-12-18 ENCOUNTER — HOSPITAL ENCOUNTER (OUTPATIENT)
Dept: PHYSICAL THERAPY | Age: 65
Setting detail: THERAPIES SERIES
Discharge: HOME | End: 2020-12-18
Payer: MEDICARE

## 2020-12-18 PROCEDURE — 97110 THERAPEUTIC EXERCISES: CPT

## 2020-12-18 RX ORDER — VENLAFAXINE HYDROCHLORIDE 37.5 MG/1
37.5 CAPSULE, EXTENDED RELEASE ORAL DAILY
Qty: 30 CAPSULE | Refills: 0 | Status: SHIPPED | OUTPATIENT
Start: 2020-12-18 | End: 2020-12-19 | Stop reason: SDUPTHER

## 2020-12-18 RX ORDER — ALPRAZOLAM 0.25 MG/1
0.25 TABLET ORAL NIGHTLY PRN
Qty: 15 TABLET | Refills: 0 | Status: SHIPPED | OUTPATIENT
Start: 2020-12-18 | End: 2020-12-19 | Stop reason: SDUPTHER

## 2020-12-18 NOTE — DISCHARGE SUMMARY
Phone: Sanaz Segura             Fax: [phone]                            Outpatient Physical Therapy                                                                    Discharge Summary    Patient: Mary Russ  : 1955  ACCT #: [de-identified]   Referring Practitioner: Dr. Tram Sampson      Diagnosis: Left TKR    Date Treatment Initiated: 2020  Date of Last Treatment: 2020  PT Visit Information  Onset Date: 10/28/20  PT Insurance Information: [facility]  Total # of Visits Approved: 10  Total # of Visits to Date: 10  Plan of Care/Certification Expiration Date: 20  No Show: 0  Canceled Appointment: 0      Treatment Received:  Patient Education/HEP and Therapeutic Exercise       Assessment: Patient has completed 10 treatment sessions consisting of exercise for ROM and strengthening following her recent left TKR. She has progressed well with PROM 4- 130 deg  and AROM 7-125 deg. Strength is 4+/5 and she is ambulating without device on all surfaces and ascending/descending steps reciprically. Patient has been educated on a home exercise program and attend a local health facility for exercise. Based on her current status, plan to discharge from further PT and allow patient to continue on an independent basis. Thank you for this referral.       Reason for Discharge:  Goals Met and Optimal Function Achieved    Comments:   Thank you for this referral      BERTO STRONG  Date: 2020

## 2020-12-18 NOTE — TELEPHONE ENCOUNTER
Inform patient I am sorry she had side effects to zoloft most of the time by remaining on med they resolve and it takes 2 weeks for the SSRI meds to reach full therapeutic effect. So since her concern is more anxiety we can try an SNRI type medication called EFFEXOR which works very well, just making sure she is not using any routine/daily alcohol products due to interaction with this med. effexor 37.5 mg XR taken once daily in AM of the day please. If she has my chart I can send her the information on this drug or pharmacist will give hand out of same info. Will send to [facility]. I will also send low dose xanax since she has been off a lower dose is indicated and should only be used if she absolutely needs it and sparingly. It is not meant to be a daily med will send in 15 tabs so as she starts on effexor to help complement her transition until it is fully therapeutic.      See me 3 weeks fter effexor med start

## 2020-12-18 NOTE — PRE-CERTIFICATION NOTE
Medicare Cap     [] Physical Therapy  [] Speech Therapy  [] Occupational therapy    *PT and Speech caps combine      $1940 Cap limit < kx modifier needed < $8254 requires pre-cert     Patient Name: Fernandez Bennett  YOB: 1955     Date of [address] Name $$$ charge Daily Charge YTD   Total $   11/17/20 EVAL 83.82 83.82 83.82   11/19/20 Ex x 3 30.08 x 3 90.24 174.06   11/24/20 Ex x 3 30.08 x 3 90.24 264.30   11/27/20 Ex x 3 30.08 x 3 90.24 354.54   12/1/20 Ex x 3 30.08 x 3 90.24 444.78   12/3/20 Ex x 3 30.08 x 3 90.24 535.02   12/8/20 Ex x 3 30.08 x 3 90.24  625.26   12/11/20 Ex x 3 30.08 x 3 90.24 715.50   12/15/20 Ex x 3 30.08 x 3 90.24 805.74   12/18/20 Ex x 2 30.08 x 2 60.16 865.90

## 2020-12-18 NOTE — PROGRESS NOTES
Strength left knee 4+/5 to ascend/descend steps reciprically-  MET  Long term goal 5: AROM 5-110 deg. - MET    Post Treatment Pain:  0/10    Time In: 0845    Time Out : 3712        Timed Code Treatment Minutes: 30 Minutes  Total Treatment Time: 30 Minutes    BERTO STRONG, JENNIFER     Date: 12/18/2020

## 2020-12-18 NOTE — PROGRESS NOTES
Fill Date ID   Written Drug Qty Days Prescriber Rx # Pharmacy Refill   Daily Dose* Pymt Type      12/05/2020  3   12/02/2020  Hydrocodone-Acetamin 5-325 MG  50.00  7 Mi Pow   [id number]   Dis (5001)   0  35.71 MME  [place]   11/25/2020  3   11/23/2020  Hydrocodone-Acetamin 5-325 MG  50.00  7 Mi Pow   [id number]   Dis (5001)   0  35.71 MME       [facility] reviewed pt using 50 tablets narcotic percocet routinely for the last month. Realize that she has informed me she is no longer on a narcotic so I do not expect to see any further percocets filled. Xanax cannot be mixed with narcotic due to risk of death/respiratory depression. I allowed small short term supply benzo (xanax) as trial as she starts on SNRI med effexor.      thanks

## 2020-12-18 NOTE — TELEPHONE ENCOUNTER
See both notes please     Fill Date ID   Written Drug Qty Days Prescriber Rx # Pharmacy Refill   Daily Dose* Pymt Type      12/05/2020  3   12/02/2020  Hydrocodone-Acetamin 5-325 MG  50.00  7 Mi Pow   [id number]   Dis (5001)   0  35.71 MME  [place]   11/25/2020  3   11/23/2020  Hydrocodone-Acetamin 5-325 MG  50.00  7 Mi Pow   [id number]   Dis (5001)   0  35.71 MME          [facility] reviewed pt using 50 tablets narcotic percocet routinely for the last month.      Realize that she has informed me she is no longer on a narcotic so I do not expect to see any further percocets filled.      Xanax cannot be mixed with narcotic due to risk of death/respiratory depression.      I allowed small short term supply benzo (xanax) as trial as she starts on SNRI med effexor.      thanks

## 2020-12-18 NOTE — PRE-CERTIFICATION NOTE
Medicare Cap     [] Physical Therapy  [] Speech Therapy  [] Occupational therapy    *PT and Speech caps combine      $1940 Cap limit < kx modifier needed < $3015 requires pre-cert     Patient Name: Mickey San  YOB: 1955     Date of [address] Name $$$ charge Daily Charge YTD   Total $   11/17/20 EVAL 83.82 83.82 83.82   11/19/20 Ex x 3 30.08 x 3 90.24 174.06   11/24/20 Ex x 3 30.08 x 3 90.24 264.30   11/27/20 Ex x 3 30.08 x 3 90.24 354.54   12/1/20 Ex x 3 30.08 x 3 90.24 444.78   12/3/20 Ex x 3 30.08 x 3 90.24 535.02   12/8/20 Ex x 3 30.08 x 3 90.24  625.26   12/11/20 Ex x 3 30.08 x 3 90.24 715.50   12/15/20 Ex x 3      12/18/20 Ex x 2

## 2020-12-19 RX ORDER — ALPRAZOLAM 0.25 MG/1
0.25 TABLET ORAL NIGHTLY PRN
Qty: 15 TABLET | Refills: 0 | Status: SHIPPED | OUTPATIENT
Start: 2020-12-19 | End: 2021-01-08 | Stop reason: DRUGHIGH

## 2020-12-19 RX ORDER — VENLAFAXINE HYDROCHLORIDE 37.5 MG/1
37.5 CAPSULE, EXTENDED RELEASE ORAL DAILY
Qty: 30 CAPSULE | Refills: 0 | Status: SHIPPED | OUTPATIENT
Start: 2020-12-19 | End: 2021-01-08

## 2021-01-08 ENCOUNTER — OFFICE VISIT (OUTPATIENT)
Dept: PRIMARY CARE CLINIC | Age: 66
End: 2021-01-08
Payer: MEDICARE

## 2021-01-08 ENCOUNTER — HOSPITAL ENCOUNTER (OUTPATIENT)
Age: 66
Setting detail: SPECIMEN
Discharge: HOME | End: 2021-01-08
Payer: MEDICARE

## 2021-01-08 VITALS
SYSTOLIC BLOOD PRESSURE: 120 MMHG | HEART RATE: 83 BPM | OXYGEN SATURATION: 98 % | TEMPERATURE: 97.7 F | BODY MASS INDEX: 26 KG/M2 | DIASTOLIC BLOOD PRESSURE: 86 MMHG | WEIGHT: 166 LBS

## 2021-01-08 DIAGNOSIS — I10 ESSENTIAL HYPERTENSION: ICD-10-CM

## 2021-01-08 DIAGNOSIS — Z63.6 CAREGIVER STRESS: ICD-10-CM

## 2021-01-08 DIAGNOSIS — D50.9 IRON DEFICIENCY ANEMIA, UNSPECIFIED IRON DEFICIENCY ANEMIA TYPE: ICD-10-CM

## 2021-01-08 DIAGNOSIS — F41.9 ANXIETY: Primary | ICD-10-CM

## 2021-01-08 LAB
ABSOLUTE EOS #: 0.11 K/UL (ref 0–0.44)
ABSOLUTE IMMATURE GRANULOCYTE: <0.03 K/UL (ref 0–0.3)
ABSOLUTE LYMPH #: 2.09 K/UL (ref 1.1–3.7)
ABSOLUTE MONO #: 0.59 K/UL (ref 0.1–1.2)
BASOPHILS # BLD: 1 % (ref 0–2)
BASOPHILS ABSOLUTE: 0.07 K/UL (ref 0–0.2)
DIFFERENTIAL TYPE: NORMAL
EOSINOPHILS RELATIVE PERCENT: 2 % (ref 1–4)
HCT VFR BLD CALC: 41.3 % (ref 36.3–47.1)
HEMOGLOBIN: 12.9 G/DL (ref 11.9–15.1)
IMMATURE GRANULOCYTES: 0 %
LYMPHOCYTES # BLD: 28 % (ref 24–43)
MCH RBC QN AUTO: 30.4 PG (ref 25.2–33.5)
MCHC RBC AUTO-ENTMCNC: 31.2 G/DL (ref 28.4–34.8)
MCV RBC AUTO: 97.2 FL (ref 82.6–102.9)
MONOCYTES # BLD: 8 % (ref 3–12)
NRBC AUTOMATED: 0 PER 100 WBC
PDW BLD-RTO: 14.4 % (ref 11.8–14.4)
PLATELET # BLD: 199 K/UL (ref 138–453)
PLATELET ESTIMATE: NORMAL
PMV BLD AUTO: 11.4 FL (ref 8.1–13.5)
RBC # BLD: 4.25 M/UL (ref 3.95–5.11)
RBC # BLD: NORMAL 10*6/UL
SEG NEUTROPHILS: 61 % (ref 36–65)
SEGMENTED NEUTROPHILS ABSOLUTE COUNT: 4.55 K/UL (ref 1.5–8.1)
WBC # BLD: 7.4 K/UL (ref 3.5–11.3)
WBC # BLD: NORMAL 10*3/UL

## 2021-01-08 PROCEDURE — G8482 FLU IMMUNIZE ORDER/ADMIN: HCPCS | Performed by: NURSE PRACTITIONER

## 2021-01-08 PROCEDURE — G8417 CALC BMI ABV UP PARAM F/U: HCPCS | Performed by: NURSE PRACTITIONER

## 2021-01-08 PROCEDURE — 85025 COMPLETE CBC W/AUTO DIFF WBC: CPT

## 2021-01-08 PROCEDURE — G8399 PT W/DXA RESULTS DOCUMENT: HCPCS | Performed by: NURSE PRACTITIONER

## 2021-01-08 PROCEDURE — 99213 OFFICE O/P EST LOW 20 MIN: CPT | Performed by: NURSE PRACTITIONER

## 2021-01-08 PROCEDURE — 1036F TOBACCO NON-USER: CPT | Performed by: NURSE PRACTITIONER

## 2021-01-08 PROCEDURE — 1123F ACP DISCUSS/DSCN MKR DOCD: CPT | Performed by: NURSE PRACTITIONER

## 2021-01-08 PROCEDURE — 3017F COLORECTAL CA SCREEN DOC REV: CPT | Performed by: NURSE PRACTITIONER

## 2021-01-08 PROCEDURE — 1090F PRES/ABSN URINE INCON ASSESS: CPT | Performed by: NURSE PRACTITIONER

## 2021-01-08 PROCEDURE — G8427 DOCREV CUR MEDS BY ELIG CLIN: HCPCS | Performed by: NURSE PRACTITIONER

## 2021-01-08 PROCEDURE — 4040F PNEUMOC VAC/ADMIN/RCVD: CPT | Performed by: NURSE PRACTITIONER

## 2021-01-08 RX ORDER — HYDROXYCHLOROQUINE SULFATE 200 MG/1
TABLET, FILM COATED ORAL
COMMUNITY
Start: 2020-11-17

## 2021-01-08 RX ORDER — VENLAFAXINE HYDROCHLORIDE 75 MG/1
75 CAPSULE, EXTENDED RELEASE ORAL DAILY
Qty: 30 CAPSULE | Refills: 3 | Status: SHIPPED | OUTPATIENT
Start: 2021-01-08 | End: 2021-03-31 | Stop reason: ALTCHOICE

## 2021-01-08 RX ORDER — ALPRAZOLAM 0.5 MG/1
0.5 TABLET ORAL DAILY PRN
Qty: 15 TABLET | Refills: 0 | Status: SHIPPED | OUTPATIENT
Start: 2021-01-08 | End: 2021-02-07

## 2021-01-08 SDOH — SOCIAL STABILITY - SOCIAL INSECURITY: DEPENDENT RELATIVE NEEDING CARE AT HOME: Z63.6

## 2021-01-08 ASSESSMENT — ENCOUNTER SYMPTOMS
ABDOMINAL DISTENTION: 0
COUGH: 0
EYE DISCHARGE: 0
COLOR CHANGE: 0

## 2021-01-08 ASSESSMENT — PATIENT HEALTH QUESTIONNAIRE - PHQ9
1. LITTLE INTEREST OR PLEASURE IN DOING THINGS: 0
SUM OF ALL RESPONSES TO PHQ QUESTIONS 1-9: 0
2. FEELING DOWN, DEPRESSED OR HOPELESS: 0
SUM OF ALL RESPONSES TO PHQ QUESTIONS 1-9: 0
SUM OF ALL RESPONSES TO PHQ QUESTIONS 1-9: 0

## 2021-01-08 NOTE — PROGRESS NOTES
[facility] [address] [facility] [address] [address]  Dept: [phone]  Dept Fax: [phone]    Last encounter 12/4/2020    HPI:   Carlitos Soler is a 77 y.o. female who presentstoday for her medical conditions/complaints as noted below. Carlitos Soler is c/o of 1 Month Follow-Up (pt states that the effoxor is not helping. States that her  has noticed that she is more irritable.)      HPI    Chronic Anxiety     patient with c/o since having left knee replacement had to stop benzo due to using narcotic recovering from surgery and restricted use of benzo and narcotic. Insurance company refused to fill. Pt was very upset by this. She feels she is \"grumpy\" with her  if not taking am xanax. We discussed safer treatments long term for anxiety with SSRI type medications. Pt history of a trial zoloft for anxiety which caused insomnia and also poor appetite. Denies nausea. Currently on effexor XR 37.5 mg daily x 1 month (started slow due to c/o side effects rapidly with SSRI)  Pt does not feel this has made a difference no change in appetite. Willing to escalate dose to effexor 75 mg XR daily. Sent x 1 month with close follow up. Will allow low dose xanax 0.5 mg #15 to use within next couple months. I discussed with pt not to have expectation that new medswill give her \"sensation of relief and relaxation\" few meds that are not CNS depression related will give her this feeling. The goal is to control the anxiety and allow her mood to be even and smooth. She is willing to discuss further options with psychiatry which referral is provided today. Pt denies any hx mental health hospitalizations. [facility] reviewed  Please not pt had #50 tabs of hydrocodone acetaminophen 5-325 last filled on 12/2/2020. Pt offers \"I used them all\" (from orthopedic Dr. Cori Sagastume) no longer on narcotic.      Pt c/o fear and anxiety of being 3. 3    Smokeless tobacco: Never Used   Substance Use Topics    Alcohol use: Yes     Alcohol/week: 5.0 standard drinks     Types: 5 Shots of liquor per week     Comment: occasional       Current Outpatient Medications   Medication Sig Dispense Refill    hydroxychloroquine (PLAQUENIL) 200 MG tablet take 1 tablet by mouth once daily      venlafaxine (EFFEXOR XR) 75 MG extended release capsule Take 1 capsule by mouth daily In am for anxiety 30 capsule 3    ALPRAZolam (XANAX) 0.5 MG tablet Take 1 tablet by mouth daily as needed for Sleep or Anxiety for up to 30 days. 15 tablet 0    oxybutynin (DITROPAN-XL) 5 MG extended release tablet Take 1 tablet by mouth daily 90 tablet 1    rosuvastatin (CRESTOR) 20 MG tablet Take 1 tablet by mouth nightly 90 tablet 1    lisinopril (PRINIVIL;ZESTRIL) 5 MG tablet Take 1 tablet by mouth daily 90 tablet 2    furosemide (LASIX) 20 MG tablet TAKE 1 TABLET BY MOUTH  DAILY 90 tablet 3    cetirizine (ZYRTEC) 10 MG tablet Take 10 mg by mouth daily      Glucosamine 500 MG CAPS Take 2,000 tablets by mouth daily      amLODIPine (NORVASC) 10 MG tablet TAKE 1 TABLET BY MOUTH  DAILY 90 tablet 3    omeprazole (PRILOSEC) 20 MG delayed release capsule TAKE 1 CAPSULE BY MOUTH  DAILY 90 capsule 1    Coenzyme Q10 (CO Q-10) 200 MG CAPS Take 1 capsule by mouth      Elastic Bandages & Supports (MEDICAL COMPRESSION STOCKINGS) MISC 1 each by Does not apply route daily as needed (varicose veins with leg swelling) 20-30 mmHG full length (Patient taking differently: 1 each by Does not apply route daily as needed (varicose veins with leg swelling) 20-30 mmHG full length, not wearing) 2 each 2    NONFORMULARY Indications: Womens 1 a day 50 plus.  NONFORMULARY Indications: N[address] way hair skin and nails  2500 mcg 1 a day.       vitamin B-12 (CYANOCOBALAMIN) 500 MCG tablet Take 500 mcg by mouth daily      Polypodium Leucotomos (HELIOCARE PO) Take 1 tablet by mouth daily      MIRVASO 0.33 % GEL       Magnesium 400 MG CAPS Take  by mouth daily.  fish oil-omega-3 fatty acids 1000 MG capsule Take 1 g by mouth 2 times daily.  vitamin D (CHOLECALCIFEROL) 400 UNITS TABS tablet Take 5,000 Units by mouth daily       calcium-vitamin D (OSCAL) 250-125 MG-UNIT per tablet Take 1 tablet by mouth daily Vit D and Vit K 750 mg daily, takes 3 a day now      Ascorbic Acid (VITAMIN C) 500 MG tablet Take 500 mg by mouth daily. No current facility-administered medications for this visit. Allergies   Allergen Reactions    Augmentin [Amoxicillin-Pot [place] Maintenance   Topic Date Due    Diabetes screen  09/20/1993    Annual Wellness Visit (AWV)  03/14/2021    Lipid screen  05/20/2021    Breast cancer screen  10/21/2021    Potassium monitoring  10/29/2021    Creatinine monitoring  10/29/2021    Pneumococcal 65+ years Vaccine (2 of 2 - PPSV23) 12/07/2021    Colon cancer screen colonoscopy  07/01/2024    DTaP/Tdap/Td vaccine (3 - Td) 12/10/2028    DEXA (modify frequency per FRAX score)  Completed    Flu vaccine  Completed    Shingles Vaccine  Completed    Hepatitis C screen  Completed    Hepatitis A vaccine  Aged Out    Hepatitis B vaccine  Aged Out    Hib vaccine  Aged Out    Meningococcal (ACWY) vaccine  Aged Out       Subjective:      Review of Systems   Constitutional: Negative for activity change, chills and fever. HENT: Negative for congestion and nosebleeds. Eyes: Negative for discharge. Respiratory: Negative for cough. Cardiovascular: Negative for chest pain. Gastrointestinal: Negative for abdominal distention. Endocrine: Negative for cold intolerance. Genitourinary: Negative for difficulty urinating. Musculoskeletal: Negative for arthralgias. Skin: Negative for color change. Neurological: Negative for headaches. Psychiatric/Behavioral: Positive for agitation and sleep disturbance.  Negative for self-injury and suicidal ideas. The patient is nervous/anxious. Objective:     Physical Exam  Vitals signs and nursing note reviewed. Constitutional:       General: She is not in acute distress. Appearance: Normal appearance. She is well-developed. HENT:      Head: Normocephalic and atraumatic. Right Ear: Tympanic membrane and external ear normal.      Left Ear: Tympanic membrane and external ear normal.      Nose: No congestion. Mouth/Throat:      Mouth: Mucous membranes are moist.   Eyes:      General: No scleral icterus. Pupils: Pupils are equal, round, and reactive to light. Neck:      Musculoskeletal: Normal range of motion and neck supple. Cardiovascular:      Rate and Rhythm: Normal rate and regular rhythm. Heart sounds: Normal heart sounds. No murmur. Pulmonary:      Effort: Pulmonary effort is normal. No respiratory distress. Breath sounds: Normal breath sounds. No wheezing. Abdominal:      Palpations: Abdomen is soft. Tenderness: There is no abdominal tenderness. Musculoskeletal: Normal range of motion. Right lower leg: No edema. Left lower leg: No edema. Lymphadenopathy:      Cervical: No cervical adenopathy. Skin:     General: Skin is warm and dry. Findings: No rash. Neurological:      Mental Status: She is alert and oriented to person, place, and time. Psychiatric:         Behavior: Behavior normal.         Thought Content:  Thought content normal.         Judgment: Judgment normal.       /86 (Site: Left Upper Arm, Position: Sitting, Cuff Size: Medium Adult)   Pulse 83   Temp 97.7 °F (36.5 °C) (Infrared)   Wt 166 lb (75.3 kg)   LMP 05/20/2012   SpO2 98%   BMI 26.00 kg/m²     Data:     Lab Results   Component Value Date     10/29/2020    K 4.3 10/29/2020     10/29/2020    CO2 23 10/29/2020    BUN 19 10/29/2020    CREATININE 0.84 10/29/2020    GLUCOSE 142 10/29/2020    PROT 7.5 05/20/2020    LABALBU 4.3 05/20/2020 LABALBU 4.3 10/19/2011    BILITOT 0.26 05/20/2020    ALKPHOS 90 05/20/2020    AST 25 05/20/2020    ALT 17 05/20/2020     Lab Results   Component Value Date    WBC 7.4 01/08/2021    RBC 4.25 01/08/2021    RBC 4.54 10/19/2011    HGB 12.9 01/08/2021    HCT 41.3 01/08/2021    MCV 97.2 01/08/2021    MCH 30.4 01/08/2021    MCHC 31.2 01/08/2021    RDW 14.4 01/08/2021     01/08/2021     10/19/2011    MPV 11.4 01/08/2021     Lab Results   Component Value Date    TSH 2.28 09/17/2019     Lab Results   Component Value Date    CHOL 157 05/20/2020    HDL 69 05/20/2020          Assessment & Plan       1. Anxiety  Needs better control  Willing to discuss with psychiatry    - venlafaxine (EFFEXOR XR) 75 MG extended release capsule; Take 1 capsule by mouth daily In am for anxiety  Dispense: 30 capsule; Refill: 3  - ALPRAZolam (XANAX) 0.5 MG tablet; Take 1 tablet by mouth daily as needed for Sleep or Anxiety for up to 30 days. Dispense: 15 tablet; Refill: 0  - Sean Harley MD, Psychiatry, [place]    2. Caregiver stress  Coping techniques encouraged /stress relaxation  Continue to exercise    - Sean Harley MD, Psychiatry, [place]    3. Essential hypertension  Stable and controlled  - CBC Auto Differential; Future    4. Iron deficiency anemia, unspecified iron deficiency anemia type  Recheck if resolved since knee replacement    - CBC Auto Differential; Future        Recheck in 4 weeks  If any s/s worsening anxiety/panic see treatment or call 911. Call psychiatry for appt. Completed Refills   Requested Prescriptions     Signed Prescriptions Disp Refills    venlafaxine (EFFEXOR XR) 75 MG extended release capsule 30 capsule 3     Sig: Take 1 capsule by mouth daily In am for anxiety    ALPRAZolam (XANAX) 0.5 MG tablet 15 tablet 0     Sig: Take 1 tablet by mouth daily as needed for Sleep or Anxiety for up to 30 days. Return in about 4 weeks (around 2/5/2021).   Orders Placed This Encounter   Medications   

## 2021-01-08 NOTE — PATIENT INSTRUCTIONS

## 2021-03-24 ENCOUNTER — HOSPITAL ENCOUNTER (OUTPATIENT)
Dept: GENERAL RADIOLOGY | Age: 66
Discharge: HOME | End: 2021-03-26
Payer: MEDICARE

## 2021-03-24 ENCOUNTER — HOSPITAL ENCOUNTER (OUTPATIENT)
Age: 66
Discharge: HOME | End: 2021-03-26
Payer: MEDICARE

## 2021-03-24 DIAGNOSIS — Z96.652 HISTORY OF TOTAL KNEE ARTHROPLASTY, LEFT: ICD-10-CM

## 2021-03-24 PROCEDURE — 73564 X-RAY EXAM KNEE 4 OR MORE: CPT

## 2021-03-31 ENCOUNTER — OFFICE VISIT (OUTPATIENT)
Dept: PRIMARY CARE CLINIC | Age: 66
End: 2021-03-31
Payer: MEDICARE

## 2021-03-31 VITALS
TEMPERATURE: 97.2 F | SYSTOLIC BLOOD PRESSURE: 138 MMHG | OXYGEN SATURATION: 98 % | BODY MASS INDEX: 26.06 KG/M2 | WEIGHT: 166 LBS | HEIGHT: 67 IN | HEART RATE: 70 BPM | DIASTOLIC BLOOD PRESSURE: 88 MMHG

## 2021-03-31 DIAGNOSIS — E53.8 VITAMIN B 12 DEFICIENCY: ICD-10-CM

## 2021-03-31 DIAGNOSIS — Z51.81 ENCOUNTER FOR MONITORING STATIN THERAPY: ICD-10-CM

## 2021-03-31 DIAGNOSIS — M20.42 ACQUIRED HAMMERTOE OF LEFT FOOT: ICD-10-CM

## 2021-03-31 DIAGNOSIS — Z79.899 ENCOUNTER FOR MONITORING STATIN THERAPY: ICD-10-CM

## 2021-03-31 DIAGNOSIS — Z87.2 HX OF DISCOID LUPUS ERYTHEMATOSUS: ICD-10-CM

## 2021-03-31 DIAGNOSIS — F41.9 ANXIETY: ICD-10-CM

## 2021-03-31 DIAGNOSIS — M19.032 PRIMARY OSTEOARTHRITIS OF BOTH WRISTS: ICD-10-CM

## 2021-03-31 DIAGNOSIS — M21.612 BUNION OF GREAT TOE OF LEFT FOOT: ICD-10-CM

## 2021-03-31 DIAGNOSIS — M19.031 PRIMARY OSTEOARTHRITIS OF BOTH WRISTS: ICD-10-CM

## 2021-03-31 DIAGNOSIS — Z79.899 ENCOUNTER FOR MONITORING DIURETIC THERAPY: ICD-10-CM

## 2021-03-31 DIAGNOSIS — Z12.31 BREAST CANCER SCREENING BY MAMMOGRAM: ICD-10-CM

## 2021-03-31 DIAGNOSIS — Z51.81 ENCOUNTER FOR MONITORING DIURETIC THERAPY: ICD-10-CM

## 2021-03-31 DIAGNOSIS — I10 ESSENTIAL HYPERTENSION: ICD-10-CM

## 2021-03-31 DIAGNOSIS — Z51.81 ENCOUNTER FOR MEDICATION MONITORING: ICD-10-CM

## 2021-03-31 DIAGNOSIS — E78.00 HYPERCHOLESTEROLEMIA: ICD-10-CM

## 2021-03-31 DIAGNOSIS — E55.9 VITAMIN D DEFICIENCY: ICD-10-CM

## 2021-03-31 DIAGNOSIS — Z00.00 ROUTINE GENERAL MEDICAL EXAMINATION AT A HEALTH CARE FACILITY: Primary | ICD-10-CM

## 2021-03-31 PROBLEM — M17.12 LOCALIZED OSTEOARTHRITIS OF LEFT KNEE: Status: RESOLVED | Noted: 2020-10-28 | Resolved: 2021-03-31

## 2021-03-31 PROCEDURE — 1123F ACP DISCUSS/DSCN MKR DOCD: CPT | Performed by: NURSE PRACTITIONER

## 2021-03-31 PROCEDURE — 99213 OFFICE O/P EST LOW 20 MIN: CPT | Performed by: NURSE PRACTITIONER

## 2021-03-31 PROCEDURE — 1090F PRES/ABSN URINE INCON ASSESS: CPT | Performed by: NURSE PRACTITIONER

## 2021-03-31 PROCEDURE — G8427 DOCREV CUR MEDS BY ELIG CLIN: HCPCS | Performed by: NURSE PRACTITIONER

## 2021-03-31 PROCEDURE — G8417 CALC BMI ABV UP PARAM F/U: HCPCS | Performed by: NURSE PRACTITIONER

## 2021-03-31 PROCEDURE — 1036F TOBACCO NON-USER: CPT | Performed by: NURSE PRACTITIONER

## 2021-03-31 PROCEDURE — 4040F PNEUMOC VAC/ADMIN/RCVD: CPT | Performed by: NURSE PRACTITIONER

## 2021-03-31 PROCEDURE — G8399 PT W/DXA RESULTS DOCUMENT: HCPCS | Performed by: NURSE PRACTITIONER

## 2021-03-31 PROCEDURE — 3017F COLORECTAL CA SCREEN DOC REV: CPT | Performed by: NURSE PRACTITIONER

## 2021-03-31 PROCEDURE — G8482 FLU IMMUNIZE ORDER/ADMIN: HCPCS | Performed by: NURSE PRACTITIONER

## 2021-03-31 PROCEDURE — G0439 PPPS, SUBSEQ VISIT: HCPCS | Performed by: NURSE PRACTITIONER

## 2021-03-31 RX ORDER — LISINOPRIL 5 MG/1
5 TABLET ORAL DAILY
Qty: 90 TABLET | Refills: 2 | Status: SHIPPED | OUTPATIENT
Start: 2021-03-31 | End: 2021-12-03

## 2021-03-31 RX ORDER — ROSUVASTATIN CALCIUM 20 MG/1
20 TABLET, COATED ORAL NIGHTLY
Qty: 90 TABLET | Refills: 1 | Status: SHIPPED | OUTPATIENT
Start: 2021-03-31 | End: 2021-07-27

## 2021-03-31 RX ORDER — CLORAZEPATE DIPOTASSIUM 7.5 MG/1
TABLET ORAL
COMMUNITY
Start: 2021-03-01

## 2021-03-31 RX ORDER — AMLODIPINE BESYLATE 10 MG/1
10 TABLET ORAL DAILY
Qty: 90 TABLET | Refills: 3 | Status: SHIPPED | OUTPATIENT
Start: 2021-03-31 | End: 2022-02-21

## 2021-03-31 RX ORDER — IBUPROFEN 800 MG/1
800 TABLET ORAL EVERY 12 HOURS PRN
Qty: 30 TABLET | Refills: 0 | Status: SHIPPED | OUTPATIENT
Start: 2021-03-31 | End: 2021-10-10

## 2021-03-31 RX ORDER — OXYBUTYNIN CHLORIDE 5 MG/1
5 TABLET, EXTENDED RELEASE ORAL DAILY
Qty: 90 TABLET | Refills: 1 | Status: SHIPPED | OUTPATIENT
Start: 2021-03-31 | End: 2021-07-27

## 2021-03-31 RX ORDER — OMEPRAZOLE 20 MG/1
20 CAPSULE, DELAYED RELEASE ORAL DAILY PRN
Qty: 90 CAPSULE | Refills: 0 | Status: SHIPPED
Start: 2021-03-31 | End: 2021-04-22

## 2021-03-31 ASSESSMENT — PATIENT HEALTH QUESTIONNAIRE - PHQ9
SUM OF ALL RESPONSES TO PHQ QUESTIONS 1-9: 0
1. LITTLE INTEREST OR PLEASURE IN DOING THINGS: 0
2. FEELING DOWN, DEPRESSED OR HOPELESS: 0
SUM OF ALL RESPONSES TO PHQ9 QUESTIONS 1 & 2: 0

## 2021-03-31 ASSESSMENT — LIFESTYLE VARIABLES
HOW OFTEN DO YOU HAVE A DRINK CONTAINING ALCOHOL: 2
HOW OFTEN DO YOU HAVE SIX OR MORE DRINKS ON ONE OCCASION: 0

## 2021-03-31 NOTE — PROGRESS NOTES
mouth Yes Historical Provider, MD   NONFORMULARY Indications: Womens 1 a day 50 plus. Yes Historical Provider, MD   NONFORMULARY Indications: [address] hair skin and nails  2500 mcg 1 a day. Yes Historical Provider, MD   vitamin B-12 (CYANOCOBALAMIN) 500 MCG tablet Take 500 mcg by mouth daily Yes Historical Provider, MD   Polypodium Leucotomos (HELIOCARE PO) Take 1 tablet by mouth daily Yes Historical Provider, MD   MIRVASO 0.33 % GEL  Yes Historical Provider, MD   Magnesium 400 MG CAPS Take  by mouth daily. Yes Historical Provider, MD   fish oil-omega-3 fatty acids 1000 MG capsule Take 1 g by mouth 2 times daily. Yes Historical Provider, MD   vitamin D (CHOLECALCIFEROL) 400 UNITS TABS tablet Take 5,000 Units by mouth daily  Yes Historical Provider, MD   calcium-vitamin D (OSCAL) 250-125 MG-UNIT per tablet Take 1 tablet by mouth daily Vit D and Vit K 750 mg daily, takes 3 a day now Yes Historical Provider, MD   Ascorbic Acid (VITAMIN C) 500 MG tablet Take 500 mg by mouth daily.    Yes Historical Provider, MD         Past Medical History:   Diagnosis Date    Arthritis     follow with Dr. [facility])     melanoma on chest    Discoid lupus     Hay fever     Hormone disorder     Hypertension     Lipoma 2009    Rosacea        Past Surgical History:   Procedure Laterality Date    BACK SURGERY  1988    L-5, [facility] @ [address]      COLONOSCOPY      COLONOSCOPY  12/16/2016    Dr. Lola Luna:  1 adenomatous polyp    COLONOSCOPY N/A 7/1/2019    Dr. Lola Luna:  Hyperplastic polyps    FOOT SURGERY      HAMMER TOE SURGERY Right 09-23-14    2nd toe, bunion surgery great right toe    HAMMER TOE SURGERY Right 1/14/2020    RIGHT 3rd TOE HAMMER REPAIR W/  Right Great Toe EHL LEGNTHENING performed by Klaus Mosquera DPM at [facility] [facility] HAND TENDON SURGERY Bilateral     HYSTERECTOMY      KNEE ARTHROSCOPY Bilateral     LIPOMA RESECTION  2009    SKIN BIOPSY      rt chest    TOTAL KNEE regular rhythm, normal S1 and S2, no murmurs, rubs, clicks, or gallops, distal pulses intact, no carotid bruits  Abdomen: soft, non-tender, non-distended, normal bowel sounds  Extremities: no cyanosis, clubbing or edema  Musculoskeletal: normal range of motion, no joint swelling, deformity or tenderness  Neurologic:  gait, coordination and speech normal    Patient's complete Health Risk Assessment and screening values have been reviewed and are found in Flowsheets. The following problems were reviewed today and where indicated follow up appointments were made and/or referrals ordered.     Positive Risk Factor Screenings with Interventions:             Safety:  Safety  Do you have working smoke detectors?: Yes  Have all throw rugs been removed or fastened?: Yes  Do you have non-slip mats or surfaces in all bathtubs/showers?: (!) No  Do all of your stairways have a railing or banister?: Yes  Are your doorways, halls and stairs free of clutter?: Yes  Do you always fasten your seatbelt when you are in a car?: Yes  Safety Interventions:  · Home safety tips provided     Personalized Preventive Plan   Current Health Maintenance Status  Immunization History   Administered Date(s) Administered    COVID-19, Moderna, PF, 100mcg/0.5mL 02/03/2021, 03/03/2021    Hepatitis A Adult (Havrix, Vaqta) 04/30/2019, 10/25/2019    Influenza Vaccine, unspecified formulation 10/18/2016, 10/17/2018    Influenza Virus Vaccine 10/23/2012    Influenza, [place], IM, PF (6 mo and older Fluzone, Flulaval, Fluarix, and 3 yrs and older Afluria) 11/04/2015, 10/04/2017, 09/11/2020    Influenza, Triv, inactivated, subunit, adjuvanted, IM (Fluad 65 yrs and older) 10/17/2018, 10/25/2019    Pneumococcal Conjugate 13-valent ([id number]) 11/04/2015    Pneumococcal Conjugate 7-valent (Prevnar7) 10/23/2012    Pneumococcal Polysaccharide ([id number]) 12/07/2016    Tdap (Boostrix, Adacel) 04/12/2017, 12/10/2018    Zoster Live (Zostavax) 11/04/2015    Zoster Recombinant (Shingrix) 11/04/2015, 02/18/2019, 04/30/2019        Health Maintenance   Topic Date Due    Diabetes screen  Never done    Annual Wellness Visit (AWV)  Never done    Lipid screen  05/20/2021    Breast cancer screen  10/21/2021    Potassium monitoring  10/29/2021    Creatinine monitoring  10/29/2021    Pneumococcal 65+ years Vaccine (2 of 2 - PPSV23) 12/07/2021    Colon cancer screen colonoscopy  07/01/2024    DTaP/Tdap/Td vaccine (3 - Td) 12/10/2028    DEXA (modify frequency per FRAX score)  Completed    Flu vaccine  Completed    Shingles Vaccine  Completed    COVID-19 Vaccine  Completed    Hepatitis C screen  Completed    Hepatitis A vaccine  Aged Out    Hepatitis B vaccine  Aged Out    Hib vaccine  Aged Out    Meningococcal (ACWY) vaccine  Aged Out     Recommendations for [employer] Due: see orders and patient instructions/AVS.  . Recommended screening schedule for the next 5-10 years is provided to the patient in written form: see Patient Instructions/AVS.    Yamel Vanessa was seen today for medicare awv. Diagnoses and all orders for this visit:    Routine general medical examination at a health care facility    Breast cancer screening by mammogram  -     [facility] MARGRET DIGITAL SCREEN BILATERAL; Future    Other orders  -     omeprazole (PRILOSEC) 20 MG delayed release capsule; Take 1 capsule by mouth daily as needed  -     oxybutynin (DITROPAN-XL) 5 MG extended release tablet; Take 1 tablet by mouth daily  -     rosuvastatin (CRESTOR) 20 MG tablet; Take 1 tablet by mouth nightly  -     amLODIPine (NORVASC) 10 MG tablet;  Take 1 tablet by mouth daily      [facility] CARE [address] [address]  Dept: [phone]  Dept Fax: [phone]    Last encounter 1/8/2021    Medicare AWV       HPI:   Jewell Cummings is a 77 y.o. female who presentstoday for her medical conditions/complaints as noted below. Cele Ledezma is c/o of Medicare AWV      HPI  Established patient      Anxiety previously on xanax, now seeing Dr. Tere Simpson on tranxene doing well feels even mood with it no highs or lows. No forgotten doses. Doing well has scheduled follow up. Hypertension well controlled no chest pain no dizziness, wt loss with excellent exercise regimen. Heart healthy diet. Since 's illnesses changed eating habits. Looking forward to visiting grandchildren in [facility] soon. Continue lisinopril 5 mg daily, amlodipine 10 mg daily. Lasix 20 mg daily. Wt Readings from Last 3 Encounters:   03/31/21 166 lb (75.3 kg)   01/08/21 166 lb (75.3 kg)   12/04/20 169 lb (76.7 kg)     Hypercholesterolemia on statin crestor tolerating well  Lab Results   Component Value Date    LDLCHOLESTEROL 72 05/20/2020     Discoid lupus on plaquenil , keeps up with eye exam. Labs are due liver and CBC ordered. Zyrtec rare for allergies. Stress incontinence on ditropan XL 5 mg daily.      Reviewed prior notes Orthopedics  Reviewed previous Labs, Imaging and Hospital Records    Past Medical History:   Diagnosis Date    Arthritis     follow with Dr. Do [facility])     melanoma on chest    Discoid lupus     Hay fever     Hormone disorder     Hypertension     Lipoma 2009    Rosacea       Past Surgical History:   Procedure Laterality Date    BACK SURGERY  1988    L-5, b[place] @ [place] BLADDER SURGERY      COLONOSCOPY      COLONOSCOPY  12/16/2016    Dr. Neil Rodriguez:  1 adenomatous polyp    COLONOSCOPY N/A 7/1/2019    Dr. Neil Rodriguez:  Hyperplastic polyps    FOOT SURGERY      HAMMER TOE SURGERY Right 09-23-14    2nd toe, bunion surgery great right toe    HAMMER TOE SURGERY Right 1/14/2020    RIGHT 3rd TOE HAMMER REPAIR W/  Right Great Toe EHL LEGNTHENING performed by Hal Olivo DPM at [address] [place] [address] HAND TENDON SURGERY Bilateral     HYSTERECTOMY      KNEE ARTHROSCOPY Bilateral     LIPOMA RESECTION  2009  SKIN BIOPSY      rt chest    TOTAL KNEE ARTHROPLASTY Left 10/28/2020    LEFT KNEE TOTAL ARTHROPLASTY - IFEANYI 360 performed by Abe Saha DO at [facility] OR       Family History   Problem Relation Age of Onset    Heart Disease Mother     High Cholesterol Mother     High Blood Pressure Mother     Heart Surgery Mother     Other Mother         Heart Cath     Heart Disease Father     Heart Failure Father     Alcohol Abuse Maternal Grandfather     Diabetes Paternal Grandfather     Other Maternal Aunt         All maternal aunts had heart disease       Social History     Tobacco Use    Smoking status: Former Smoker     Packs/day: 0.50     Years: 23.00     Pack years: 11.50     Types: Cigarettes     Start date: 9/18/1994     Quit date: 9/18/2017     Years since quitting: 3.5    Smokeless tobacco: Never Used   Substance Use Topics    Alcohol use:  Yes     Alcohol/week: 5.0 standard drinks     Types: 5 Shots of liquor per week     Comment: occasional       Current Outpatient Medications   Medication Sig Dispense Refill    clorazepate (TRANXENE) 7.5 MG tablet TAKE 1 TABLET BY MOUTH EVERY DAY      ibuprofen (ADVIL;MOTRIN) 800 MG tablet Take 1 tablet by mouth every 12 hours as needed for Pain 30 tablet 0    omeprazole (PRILOSEC) 20 MG delayed release capsule Take 1 capsule by mouth daily as needed 90 capsule 0    oxybutynin (DITROPAN-XL) 5 MG extended release tablet Take 1 tablet by mouth daily 90 tablet 1    rosuvastatin (CRESTOR) 20 MG tablet Take 1 tablet by mouth nightly 90 tablet 1    lisinopril (PRINIVIL;ZESTRIL) 5 MG tablet Take 1 tablet by mouth daily 90 tablet 2    amLODIPine (NORVASC) 10 MG tablet Take 1 tablet by mouth daily 90 tablet 3    hydroxychloroquine (PLAQUENIL) 200 MG tablet take 1 tablet by mouth once daily      furosemide (LASIX) 20 MG tablet TAKE 1 TABLET BY MOUTH  DAILY (Patient taking differently: Take 20 mg by mouth daily PRN) 90 tablet 3    cetirizine (ZYRTEC) 10 MG tablet Take 10 mg by mouth daily      Glucosamine 500 MG CAPS Take 2,000 tablets by mouth daily      Coenzyme Q10 (CO Q-10) 200 MG CAPS Take 1 capsule by mouth      NONFORMULARY Indications: Womens 1 a day 50 plus.  NONFORMULARY Indications: [address] hair skin and nails  2500 mcg 1 a day.  vitamin B-12 (CYANOCOBALAMIN) 500 MCG tablet Take 500 mcg by mouth daily      Polypodium Leucotomos (HELIOCARE PO) Take 1 tablet by mouth daily      MIRVASO 0.33 % GEL       Magnesium 400 MG CAPS Take  by mouth daily.  fish oil-omega-3 fatty acids 1000 MG capsule Take 1 g by mouth 2 times daily.  vitamin D (CHOLECALCIFEROL) 400 UNITS TABS tablet Take 5,000 Units by mouth daily       calcium-vitamin D (OSCAL) 250-125 MG-UNIT per tablet Take 1 tablet by mouth daily Vit D and Vit K 750 mg daily, takes 3 a day now      Ascorbic Acid (VITAMIN C) 500 MG tablet Take 500 mg by mouth daily. No current facility-administered medications for this visit. Allergies   Allergen Reactions    Augmentin [Amoxicillin-Pot Clavulanate]      Gave [address] Maintenance   Topic Date Due    Diabetes screen  Never done    Annual Wellness Visit (AWV)  Never done    Lipid screen  05/20/2021    Breast cancer screen  10/21/2021    Potassium monitoring  10/29/2021    Creatinine monitoring  10/29/2021    Pneumococcal 65+ years Vaccine (2 of 2 - PPSV23) 12/07/2021    Colon cancer screen colonoscopy  07/01/2024    DTaP/Tdap/Td vaccine (3 - Td) 12/10/2028    DEXA (modify frequency per FRAX score)  Completed    Flu vaccine  Completed    Shingles Vaccine  Completed    COVID-19 Vaccine  Completed    Hepatitis C screen  Completed    Hepatitis A vaccine  Aged Out    Hepatitis B vaccine  Aged Out    Hib vaccine  Aged Out    Meningococcal (ACWY) vaccine  Aged Out       Subjective:      Review of Systems   Constitutional: Negative for activity change, appetite change, chills and fever.    HENT: Negative for congestion and rhinorrhea. Eyes: Negative. Respiratory: Negative for cough and shortness of breath. Cardiovascular: Negative for chest pain. Endocrine: Negative for cold intolerance and heat intolerance. Genitourinary: Negative for difficulty urinating. Neurological: Negative for dizziness and headaches. Objective:     Physical Exam  Vitals signs and nursing note reviewed. Constitutional:       General: She is not in acute distress. Appearance: Normal appearance. She is well-developed. HENT:      Head: Normocephalic and atraumatic. Right Ear: Tympanic membrane and external ear normal.      Left Ear: Tympanic membrane and external ear normal.      Nose: No congestion. Mouth/Throat:      Mouth: Mucous membranes are moist.   Eyes:      General: No scleral icterus. Pupils: Pupils are equal, round, and reactive to light. Neck:      Musculoskeletal: Normal range of motion and neck supple. Vascular: No carotid bruit (neg luz elena). Cardiovascular:      Rate and Rhythm: Normal rate and regular rhythm. Heart sounds: Normal heart sounds. No murmur. Pulmonary:      Effort: Pulmonary effort is normal. No respiratory distress. Breath sounds: Normal breath sounds. No wheezing. Abdominal:      Palpations: Abdomen is soft. Tenderness: There is no abdominal tenderness. Musculoskeletal: Normal range of motion. Right lower leg: No edema. Left lower leg: No edema. Lymphadenopathy:      Cervical: No cervical adenopathy. Skin:     General: Skin is warm and dry. Neurological:      Mental Status: She is alert and oriented to person, place, and time. Psychiatric:         Behavior: Behavior normal.         Thought Content:  Thought content normal.         Judgment: Judgment normal.       /88 (Site: Right Upper Arm, Position: Sitting, Cuff Size: Medium Adult)   Pulse 70   Temp 97.2 °F (36.2 °C) (Infrared)   Ht 5' 7\" (1.702 m)   Wt 166 of great toe of left foot  Known declines referral  Good foot support. 13. Acquired hammertoe of left foot      14. Encounter for monitoring statin therapy    - Comprehensive Metabolic Panel; Future      Do labs fasting please schedule in June. See in 6 months. Keep appt with psychiatry  Has follow up ortho knee   Doing great on exercise. Completed Refills   Requested Prescriptions     Signed Prescriptions Disp Refills    ibuprofen (ADVIL;MOTRIN) 800 MG tablet 30 tablet 0     Sig: Take 1 tablet by mouth every 12 hours as needed for Pain    omeprazole (PRILOSEC) 20 MG delayed release capsule 90 capsule 0     Sig: Take 1 capsule by mouth daily as needed    oxybutynin (DITROPAN-XL) 5 MG extended release tablet 90 tablet 1     Sig: Take 1 tablet by mouth daily    rosuvastatin (CRESTOR) 20 MG tablet 90 tablet 1     Sig: Take 1 tablet by mouth nightly    lisinopril (PRINIVIL;ZESTRIL) 5 MG tablet 90 tablet 2     Sig: Take 1 tablet by mouth daily    amLODIPine (NORVASC) 10 MG tablet 90 tablet 3     Sig: Take 1 tablet by mouth daily     Return in 6 months (on 9/30/2021) for Medicare Annual Wellness Visit in 1 year, HTN check, hypercholesterolemia, anxiety ; lab draw June .   Orders Placed This Encounter   Medications    ibuprofen (ADVIL;MOTRIN) 800 MG tablet     Sig: Take 1 tablet by mouth every 12 hours as needed for Pain     Dispense:  30 tablet     Refill:  0    omeprazole (PRILOSEC) 20 MG delayed release capsule     Sig: Take 1 capsule by mouth daily as needed     Dispense:  90 capsule     Refill:  0     Requesting 1 year supply    oxybutynin (DITROPAN-XL) 5 MG extended release tablet     Sig: Take 1 tablet by mouth daily     Dispense:  90 tablet     Refill:  1    rosuvastatin (CRESTOR) 20 MG tablet     Sig: Take 1 tablet by mouth nightly     Dispense:  90 tablet     Refill:  1    lisinopril (PRINIVIL;ZESTRIL) 5 MG tablet     Sig: Take 1 tablet by mouth daily     Dispense:  90 tablet Refill:  2    amLODIPine (NORVASC) 10 MG tablet     Sig: Take 1 tablet by mouth daily     Dispense:  90 tablet     Refill:  3     Orders Placed This Encounter   Procedures    FADI MARGRET DIGITAL SCREEN BILATERAL     Standing Status:   Future     Standing Expiration Date:   5/31/2022     Order Specific Question:   Reason for exam:     Answer:   screening, maternal aunt with breast cancer.  Lipid Panel     Standing Status:   Future     Standing Expiration Date:   3/31/2022     Order Specific Question:   Is Patient Fasting?/# of Hours     Answer:   yes    Comprehensive Metabolic Panel     Standing Status:   Future     Standing Expiration Date:   3/31/2022    Vitamin D 25 Hydroxy     Standing Status:   Future     Standing Expiration Date:   3/31/2022    CBC Auto Differential     Standing Status:   Future     Standing Expiration Date:   3/31/2022    Vitamin B12 & Folate     Standing Status:   Future     Standing Expiration Date:   3/31/2022         Kimi Yen received counseling on the following healthy behaviors: nutrition, exercise and medication adherence  Reviewed prior labs and health maintenance. Continue current medications, diet and exercise. Discussed use, benefit, and side effects of prescribed medications. Barriers to medication compliance addressed. Patient given educational materials - see patient instructions. All patient questions answered. Patient voiced understanding. On this date 3/31/2021 I have spent 28 minutes reviewing previous notes, test results and face to face with the patient discussing the diagnosis and importance of compliance with the treatment plan as well as documenting on the day of the visit.      Electronically signed by JESSENIA Andrew CNP on 4/1/2021 at 8:36 PM

## 2021-03-31 NOTE — PATIENT INSTRUCTIONS
Preventing falls is a special concern due to an increase in the likelihood of fracturing a bone. Factors affecting FALLS include: environmental factors, impaired vision or balance, chronic diseases that affect mental or physical functioning and certain medications (sedatives, antidepressants, benzodiazepines, narcotics and alcohol). Here are some tips to eliminate environmental factors that can lead to falls    Outdoors:  -use a cane or walker for added stability  -wear rubber-soled shoes for traction  -walk on grass when sidewalks are slippery  -In winter, carry salt or verito litter to sprinkle on slippery sidewalks.   -be careful on highly polished floors that become slick and dangerous when wet  -walk on even or level ground when able    Indoors:  -Keep rooms free of clutter, especially the floors  -Keep floor surfaces smooth but not slippery  -Wear supportive, low-heeled shoes even at home  -Avoid walking in socks, stockings or slippers.  -Be sure carpets and area rugs have skid-proof backing or are tacked to the floor  -Install grab bars on the bathroom walls near tub, shower and toilet. -Use a rubber bath mat in shower or tub  -Keep a flashlight with fresh batteries beside your bed.   -If using a step stool for hard-to-reach places, use a sturdy one with a handrails on both sides. OR  As a family member to help.   -Add ceiling fixtures to rooms lit by lamps. -Consider purchasing a cordless phone so that you do not have to rush to answer the phone when it rings, or so that you can call for help if you do fall. Personalized Preventive Plan for Taryn Campos - 3/31/2021  Medicare offers a range of preventive health benefits. Some of the tests and screenings are paid in full while other may be subject to a deductible, co-insurance, and/or copay.     Some of these benefits include a comprehensive review of your medical history including lifestyle, illnesses that may run in your family, and various assessments and screenings as appropriate. After reviewing your medical record and screening and assessments performed today your provider may have ordered immunizations, labs, imaging, and/or referrals for you. A list of these orders (if applicable) as well as your Preventive Care list are included within your After Visit Summary for your review. Other Preventive Recommendations:    · A preventive eye exam performed by an eye specialist is recommended every 1-2 years to screen for glaucoma; cataracts, macular degeneration, and other eye disorders. · A preventive dental visit is recommended every 6 months. · Try to get at least 150 minutes of exercise per week or 10,000 steps per day on a pedometer . · Order or download the FREE \"Exercise & Physical Activity: Your Everyday Guide\" from [employer]. Call 6-327.412.6425 or search [employer] online. · You need 9844-6569 mg of calcium and 1186-6619 IU of vitamin D per day. It is possible to meet your calcium requirement with diet alone, but a vitamin D supplement is usually necessary to meet this goal.  · When exposed to the sun, use a sunscreen that protects against both UVA and UVB radiation with an SPF of 30 or greater. Reapply every 2 to 3 hours or after sweating, drying off with a towel, or swimming. · Always wear a seat belt when traveling in a car. Always wear a helmet when riding a bicycle or motorcycle. You may be receiving a survey from [employer] regarding your visit today. You may get this in the mail, through your MyChart or in your email. Please complete the survey to enable us to provide the highest quality of care to you and your family. If you cannot score us as very good ( 5 Stars) on any question, please feel free to call the office to discuss how we could have made your experience exceptional.     Thank You! Robin Sánchez, JESSENIA-CNP  [address]  [place], [place]    Phone: [phone]  Fax: [address] [facility] Office Hours:  Monday: Selene Hawley office location [phone] ([phone]) Offering additional late hours the first Monday of the month until 7 pm.   Tuesday: 8-5 Wednesday: 8-5 Thursday:  Additional hours offered 2 Thursdays a month. Please call to inquire those dates.    Fridays: 7:30-4:30

## 2021-04-01 ASSESSMENT — ENCOUNTER SYMPTOMS
SHORTNESS OF BREATH: 0
COUGH: 0
EYES NEGATIVE: 1
RHINORRHEA: 0

## 2021-04-22 RX ORDER — OMEPRAZOLE 20 MG/1
CAPSULE, DELAYED RELEASE ORAL
Qty: 90 CAPSULE | Refills: 3 | Status: SHIPPED | OUTPATIENT
Start: 2021-04-22 | End: 2021-10-10 | Stop reason: ALTCHOICE

## 2021-04-22 NOTE — TELEPHONE ENCOUNTER
Last OV: 3/31/2021  Last RX: 03/03/21   Next scheduled apt: 6/16/2021    Medication pending.       Sure scripts request        Health Maintenance   Topic Date Due    Diabetes screen  Never done    Lipid screen  05/20/2021    Breast cancer screen  10/21/2021    Potassium monitoring  10/29/2021    Creatinine monitoring  10/29/2021    Pneumococcal 65+ years Vaccine (2 of 2 - PPSV23) 12/07/2021    Annual Wellness Visit (AWV)  04/01/2022    Colon cancer screen colonoscopy  07/01/2024    DTaP/Tdap/Td vaccine (3 - Td) 12/10/2028    DEXA (modify frequency per FRAX score)  Completed    Flu vaccine  Completed    Shingles Vaccine  Completed    COVID-19 Vaccine  Completed    Hepatitis C screen  Completed    Hepatitis A vaccine  Aged Out    Hepatitis B vaccine  Aged Out    Hib vaccine  Aged Out    Meningococcal (ACWY) vaccine  Aged Out             (applicable per patient's age: Cancer Screenings, Depression Screening, Fall Risk Screening, Immunizations)    LDL Cholesterol (mg/dL)   Date Value   05/20/2020 72     AST (U/L)   Date Value   05/20/2020 25     ALT (U/L)   Date Value   05/20/2020 17     BUN (mg/dL)   Date Value   10/29/2020 19      (goal A1C is < 7)   (goal LDL is <100) need 30-50% reduction from baseline     BP Readings from Last 3 Encounters:   03/31/21 138/88   01/08/21 120/86   12/04/20 128/80    (goal /80)      All Future Testing planned in [facility]:  Lab Frequency Next Occurrence   Lipid Panel Once 04/30/2021   Comprehensive Metabolic Panel Once 41/26/5119   Vitamin D 25 Hydroxy Once 04/30/2021   CBC Auto Differential Once 04/30/2021   Vitamin B12 & Folate Once 04/30/2021   FADI MARGRET DIGITAL SCREEN BILATERAL Once 04/01/2021       Next Visit Date:  Future Appointments   Date Time Provider Melissa Gaitan   [facility]2021  [facility] [address] [facility]   6/16/2021  9:30 AM Napolean Kimmie, APRN - CNP [facility]   9/29/2021  9:30 AM Napolean Kimmie APRN - CNP [facility] Patient Active Problem List:     HTN (hypertension)     Arthritis     Chronic left shoulder pain     Anxiety     History of adenomatous polyp of colon     Osteoarthritis of left knee     Bunion of great toe of left foot     Acquired hammertoe of left foot

## 2021-05-03 ENCOUNTER — HOSPITAL ENCOUNTER (OUTPATIENT)
Dept: MAMMOGRAPHY | Age: 66
Discharge: HOME | End: 2021-05-05
Payer: MEDICARE

## 2021-05-03 DIAGNOSIS — Z12.31 BREAST CANCER SCREENING BY MAMMOGRAM: ICD-10-CM

## 2021-05-03 PROCEDURE — 77063 BREAST TOMOSYNTHESIS BI: CPT

## 2021-06-16 ENCOUNTER — NURSE ONLY (OUTPATIENT)
Dept: PRIMARY CARE CLINIC | Age: 66
End: 2021-06-16
Payer: MEDICARE

## 2021-06-16 ENCOUNTER — HOSPITAL ENCOUNTER (OUTPATIENT)
Age: 66
Setting detail: SPECIMEN
Discharge: HOME | End: 2021-06-16
Payer: MEDICARE

## 2021-06-16 DIAGNOSIS — Z51.81 ENCOUNTER FOR MONITORING STATIN THERAPY: ICD-10-CM

## 2021-06-16 DIAGNOSIS — E55.9 VITAMIN D DEFICIENCY: ICD-10-CM

## 2021-06-16 DIAGNOSIS — I10 ESSENTIAL HYPERTENSION: ICD-10-CM

## 2021-06-16 DIAGNOSIS — Z87.2 HX OF DISCOID LUPUS ERYTHEMATOSUS: ICD-10-CM

## 2021-06-16 DIAGNOSIS — Z79.899 ENCOUNTER FOR MONITORING STATIN THERAPY: ICD-10-CM

## 2021-06-16 DIAGNOSIS — E78.00 PURE HYPERCHOLESTEROLEMIA: ICD-10-CM

## 2021-06-16 DIAGNOSIS — Z51.81 ENCOUNTER FOR MEDICATION MONITORING: ICD-10-CM

## 2021-06-16 DIAGNOSIS — E53.8 VITAMIN B 12 DEFICIENCY: ICD-10-CM

## 2021-06-16 DIAGNOSIS — E78.00 HYPERCHOLESTEROLEMIA: ICD-10-CM

## 2021-06-16 LAB
ALBUMIN SERPL-MCNC: 4.2 G/DL (ref 3.5–5.2)
ALBUMIN/GLOBULIN RATIO: 1.8 (ref 1–2.5)
ALP BLD-CCNC: 79 U/L (ref 35–104)
ALT SERPL-CCNC: 18 U/L (ref 5–33)
ANION GAP SERPL CALCULATED.3IONS-SCNC: 8 MMOL/L (ref 9–17)
AST SERPL-CCNC: 31 U/L
BILIRUB SERPL-MCNC: 0.25 MG/DL (ref 0.3–1.2)
BUN BLDV-MCNC: 16 MG/DL (ref 8–23)
BUN/CREAT BLD: 19 (ref 9–20)
CALCIUM SERPL-MCNC: 10.1 MG/DL (ref 8.6–10.4)
CHLORIDE BLD-SCNC: 102 MMOL/L (ref 98–107)
CHOLESTEROL/HDL RATIO: 1.9
CHOLESTEROL: 143 MG/DL
CO2: 27 MMOL/L (ref 20–31)
CREAT SERPL-MCNC: 0.83 MG/DL (ref 0.5–0.9)
GFR AFRICAN AMERICAN: >60 ML/MIN
GFR NON-AFRICAN AMERICAN: >60 ML/MIN
GFR SERPL CREATININE-BSD FRML MDRD: ABNORMAL ML/MIN/{1.73_M2}
GFR SERPL CREATININE-BSD FRML MDRD: ABNORMAL ML/MIN/{1.73_M2}
GLUCOSE BLD-MCNC: 86 MG/DL (ref 70–99)
HDLC SERPL-MCNC: 75 MG/DL
LDL CHOLESTEROL: 57 MG/DL (ref 0–130)
POTASSIUM SERPL-SCNC: 4.2 MMOL/L (ref 3.7–5.3)
SODIUM BLD-SCNC: 137 MMOL/L (ref 135–144)
TOTAL PROTEIN: 6.6 G/DL (ref 6.4–8.3)
TRIGL SERPL-MCNC: 54 MG/DL
VLDLC SERPL CALC-MCNC: NORMAL MG/DL (ref 1–30)

## 2021-06-16 PROCEDURE — 36415 COLL VENOUS BLD VENIPUNCTURE: CPT | Performed by: NURSE PRACTITIONER

## 2021-06-16 PROCEDURE — 80061 LIPID PANEL: CPT

## 2021-06-16 PROCEDURE — 80053 COMPREHEN METABOLIC PANEL: CPT

## 2021-06-16 PROCEDURE — 82746 ASSAY OF FOLIC ACID SERUM: CPT

## 2021-06-16 PROCEDURE — 82306 VITAMIN D 25 HYDROXY: CPT

## 2021-06-16 PROCEDURE — 82607 VITAMIN B-12: CPT

## 2021-06-16 SDOH — ECONOMIC STABILITY: FOOD INSECURITY: WITHIN THE PAST 12 MONTHS, YOU WORRIED THAT YOUR FOOD WOULD RUN OUT BEFORE YOU GOT MONEY TO BUY MORE.: NEVER TRUE

## 2021-06-16 SDOH — ECONOMIC STABILITY: FOOD INSECURITY: WITHIN THE PAST 12 MONTHS, THE FOOD YOU BOUGHT JUST DIDN'T LAST AND YOU DIDN'T HAVE MONEY TO GET MORE.: NEVER TRUE

## 2021-06-16 ASSESSMENT — SOCIAL DETERMINANTS OF HEALTH (SDOH): HOW HARD IS IT FOR YOU TO PAY FOR THE VERY BASICS LIKE FOOD, HOUSING, MEDICAL CARE, AND HEATING?: NOT HARD AT ALL

## 2021-06-16 NOTE — PATIENT INSTRUCTIONS
You may be receiving a survey from [email or web address] regarding your visit today. You may get this in the mail, through your MyChart or in your email. Please complete the survey to enable us to provide the highest quality of care to you and your family. If you cannot score us as very good ( 5 Stars) on any question, please feel free to call the office to discuss how we could have made your experience exceptional.     Thank You! JESSENIA eMnard-CNP  [address] [facility], [address]    Phone: [phone]  Fax: [phone] [facility] Office Hours:  Monday: [employer] office location [phone] ([phone]) Offering additional late hours the first Monday of the month until 7 pm.   Tuesday: 8-5 Wednesday: 8-5 Thursday:  Additional hours offered 2 Thursdays a month. Please call to inquire those dates.    Fridays: 7:30-4:30

## 2021-06-16 NOTE — PROGRESS NOTES
Pt had blood drawn from Rt AC   Pt tolerated procedure well. Held pressure for 1 min after. Minimal bleeding or bruising after.

## 2021-06-17 LAB
FOLATE: >20 NG/ML
VITAMIN B-12: >2000 PG/ML (ref 232–1245)
VITAMIN D 25-HYDROXY: 80.4 NG/ML (ref 30–100)

## 2021-07-27 RX ORDER — ROSUVASTATIN CALCIUM 20 MG/1
TABLET, COATED ORAL
Qty: 90 TABLET | Refills: 3 | Status: SHIPPED | OUTPATIENT
Start: 2021-07-27 | End: 2021-10-08 | Stop reason: SDUPTHER

## 2021-07-27 RX ORDER — OXYBUTYNIN CHLORIDE 5 MG/1
5 TABLET, EXTENDED RELEASE ORAL DAILY
Qty: 90 TABLET | Refills: 3 | Status: SHIPPED | OUTPATIENT
Start: 2021-07-27 | End: 2021-10-08 | Stop reason: SDUPTHER

## 2021-07-27 NOTE — TELEPHONE ENCOUNTER
Last OV: 3/31/2021  Last RX: 03/31/21   Next scheduled apt: 9/29/2021    Medication pending.         Health Maintenance   Topic Date Due    Flu vaccine (1) 09/01/2021    Pneumococcal 65+ years Vaccine (2 of 2 - PPSV23) 12/07/2021    Annual Wellness Visit (AWV)  04/01/2022    Lipid screen  06/16/2022    Potassium monitoring  06/16/2022    Creatinine monitoring  06/16/2022    Breast cancer screen  05/03/2023    Colon cancer screen colonoscopy  07/01/2024    DTaP/Tdap/Td vaccine (3 - Td or Tdap) 12/10/2028    DEXA (modify frequency per FRAX score)  Completed    Shingles Vaccine  Completed    COVID-19 Vaccine  Completed    Hepatitis C screen  Completed    Hepatitis A vaccine  Aged Out    Hepatitis B vaccine  Aged Out    Hib vaccine  Aged Out    Meningococcal (ACWY) vaccine  Aged Out             (applicable per patient's age: Cancer Screenings, Depression Screening, Fall Risk Screening, Immunizations)    LDL Cholesterol (mg/dL)   Date Value   06/16/2021 57     AST (U/L)   Date Value   06/16/2021 31     ALT (U/L)   Date Value   06/16/2021 18     BUN (mg/dL)   Date Value   06/16/2021 16      (goal A1C is < 7)   (goal LDL is <100) need 30-50% reduction from baseline     BP Readings from Last 3 Encounters:   03/31/21 138/88   01/08/21 120/86   12/04/20 128/80    (goal /80)      All Future Testing planned in [facility]:  Lab Frequency Next Occurrence       Next Visit Date:  Future Appointments   Date Time Provider Melissa Gaitan   9/29/2021  9:30 AM JSESENIA Wyman CNP nw pc [facility]            Patient Active Problem List:     HTN (hypertension)     Arthritis     Chronic left shoulder pain     Anxiety     History of adenomatous polyp of colon     Osteoarthritis of left knee     Bunion of great toe of left foot     Acquired hammertoe of left foot

## 2021-10-08 ENCOUNTER — OFFICE VISIT (OUTPATIENT)
Dept: PRIMARY CARE CLINIC | Age: 66
End: 2021-10-08
Payer: MEDICARE

## 2021-10-08 VITALS
HEIGHT: 67 IN | OXYGEN SATURATION: 97 % | BODY MASS INDEX: 23.7 KG/M2 | WEIGHT: 151 LBS | DIASTOLIC BLOOD PRESSURE: 84 MMHG | SYSTOLIC BLOOD PRESSURE: 120 MMHG | RESPIRATION RATE: 20 BRPM | HEART RATE: 53 BPM

## 2021-10-08 DIAGNOSIS — E78.00 PURE HYPERCHOLESTEROLEMIA: ICD-10-CM

## 2021-10-08 DIAGNOSIS — M15.9 PRIMARY OSTEOARTHRITIS INVOLVING MULTIPLE JOINTS: ICD-10-CM

## 2021-10-08 DIAGNOSIS — E53.8 VITAMIN B 12 DEFICIENCY: ICD-10-CM

## 2021-10-08 DIAGNOSIS — I10 ESSENTIAL HYPERTENSION: Primary | ICD-10-CM

## 2021-10-08 DIAGNOSIS — F41.9 ANXIETY: ICD-10-CM

## 2021-10-08 DIAGNOSIS — Z87.2 HX OF DISCOID LUPUS ERYTHEMATOSUS: ICD-10-CM

## 2021-10-08 PROCEDURE — 4040F PNEUMOC VAC/ADMIN/RCVD: CPT | Performed by: NURSE PRACTITIONER

## 2021-10-08 PROCEDURE — G8427 DOCREV CUR MEDS BY ELIG CLIN: HCPCS | Performed by: NURSE PRACTITIONER

## 2021-10-08 PROCEDURE — 99213 OFFICE O/P EST LOW 20 MIN: CPT | Performed by: NURSE PRACTITIONER

## 2021-10-08 PROCEDURE — 1036F TOBACCO NON-USER: CPT | Performed by: NURSE PRACTITIONER

## 2021-10-08 PROCEDURE — G8399 PT W/DXA RESULTS DOCUMENT: HCPCS | Performed by: NURSE PRACTITIONER

## 2021-10-08 PROCEDURE — 1090F PRES/ABSN URINE INCON ASSESS: CPT | Performed by: NURSE PRACTITIONER

## 2021-10-08 PROCEDURE — 1123F ACP DISCUSS/DSCN MKR DOCD: CPT | Performed by: NURSE PRACTITIONER

## 2021-10-08 PROCEDURE — G8484 FLU IMMUNIZE NO ADMIN: HCPCS | Performed by: NURSE PRACTITIONER

## 2021-10-08 PROCEDURE — 3017F COLORECTAL CA SCREEN DOC REV: CPT | Performed by: NURSE PRACTITIONER

## 2021-10-08 PROCEDURE — G8420 CALC BMI NORM PARAMETERS: HCPCS | Performed by: NURSE PRACTITIONER

## 2021-10-08 RX ORDER — OXYBUTYNIN CHLORIDE 5 MG/1
5 TABLET, EXTENDED RELEASE ORAL DAILY
Qty: 90 TABLET | Refills: 3 | Status: SHIPPED | OUTPATIENT
Start: 2021-10-08 | End: 2022-09-07

## 2021-10-08 RX ORDER — ROSUVASTATIN CALCIUM 20 MG/1
20 TABLET, COATED ORAL DAILY
Qty: 90 TABLET | Refills: 3 | Status: SHIPPED | OUTPATIENT
Start: 2021-10-08 | End: 2022-08-29

## 2021-10-08 NOTE — PROGRESS NOTES
[address] [facility] PRIMARY CARE [address] [address]  Dept: [phone]     Subjective:  Ulises Arzate is a 74 y.o. female presenting today for routine follow up of hypertension and hyperlipidemia and recent labs. She denies chest pain, shortness of breath or palpitations. She denies headaches, vision changes and lightheadedness. She denies myalgias. She denies numbness and tingling in the hands and feet. She has been compliant with diet and exercise. She has been compliant with her medications. She is tolerating medications. · Hypertension: well controlled with current medications   · Medications: continue current medication doses   Lasix, amlodipine, lisinopril. · Hyperlipidemia: well controlled with current medications   · Medications: continue current medication doses   · Recommended low cholesterol diet - continue crestor. ·     · Nutrition Status:  well developed, well nourished   · Recommend continue current healthy lifestyle with diet and regular exercise       Dermatology- Dr. Grazyna Schmidt. CURRENT ALLERGIES: Augmentin [amoxicillin-pot clavulanate]    SOCIAL HISTORY:   Social History     Tobacco Use    Smoking status: Former Smoker     Packs/day: 0.50     Years: 23.00     Pack years: 11.50     Types: Cigarettes     Start date: 1994     Quit date: 2017     Years since quittin.0    Smokeless tobacco: Never Used   Vaping Use    Vaping Use: Never used   Substance Use Topics    Alcohol use: Yes     Alcohol/week: 5.0 standard drinks     Types: 5 Shots of liquor per week     Comment: occasional     Drug use: Not Currently       HPI       Medication List          Accurate as of 2021 11:59 PM. If you have any questions, ask your nurse or doctor.             CHANGE how you take these medications    furosemide 20 MG tablet  Commonly known as: LASIX  TAKE 1 TABLET BY MOUTH  DAILY  What changed: additional instructions     rosuvastatin 20 MG tablet  Commonly known as: CRESTOR  Take 1 tablet by mouth daily  What changed: See the new instructions. Changed by: JESSENIA Nevarez CNP        CONTINUE taking these medications    amLODIPine 10 MG tablet  Commonly known as: NORVASC  Take 1 tablet by mouth daily     calcium-vitamin D 250-125 MG-UNIT per tablet  Commonly known as: OSCAL     cetirizine 10 MG tablet  Commonly known as: ZYRTEC     clorazepate 7.5 MG tablet  Commonly known as: TRANXENE     Co Q-10 200 MG Caps     fish oil-omega-3 fatty acids 1000 MG capsule     Glucosamine 500 MG Caps     HELIOCARE PO     hydroxychloroquine 200 MG tablet  Commonly known as: PLAQUENIL     lisinopril 5 MG tablet  Commonly known as: PRINIVIL;ZESTRIL  Take 1 tablet by mouth daily     Magnesium 400 MG Caps     * NONFORMULARY     * NONFORMULARY     oxybutynin 5 MG extended release tablet  Commonly known as: DITROPAN-XL  Take 1 tablet by mouth daily     vitamin B-12 500 MCG tablet  Commonly known as: CYANOCOBALAMIN     vitamin C 500 MG tablet  Commonly known as: ASCORBIC ACID     vitamin D 400 units Tabs tablet  Commonly known as: CHOLECALCIFEROL         * This list has 2 medication(s) that are the same as other medications prescribed for you. Read the directions carefully, and ask your doctor or other care provider to review them with you. STOP taking these medications    omeprazole 20 MG delayed release capsule  Commonly known as: PRILOSEC  Stopped by: JESSENIA Nevarez CNP           Where to Get Your Medications      These medications [address] sent [address] [address] [address]    Phone: [phone]   · oxybutynin 5 MG extended release tablet  · rosuvastatin 20 MG tablet            Review of Systems   Constitutional: Negative for activity change, chills and fever. HENT: Negative for congestion and nosebleeds. Eyes: Negative. Respiratory: Negative for cough and shortness of breath. Cardiovascular: Negative for chest pain. Gastrointestinal: Negative for abdominal distention and nausea. Endocrine: Negative for cold intolerance and heat intolerance. Genitourinary: Negative for difficulty urinating. Musculoskeletal: Negative for back pain and neck pain. Skin: Negative for rash. Neurological: Negative for dizziness and light-headedness. Psychiatric/Behavioral: Negative for decreased concentration, self-injury, sleep disturbance and suicidal ideas. The patient is not nervous/anxious. Objective:  /84 (Site: Right Upper Arm, Position: Sitting, Cuff Size: Medium Adult)   Pulse 53   Resp 20   Ht 5' 7\" (1.702 m)   Wt 151 lb (68.5 kg)   LMP 05/20/2012   SpO2 97%   BMI 23.65 kg/m²   Physical Exam  Vitals and nursing note reviewed. Constitutional:       General: She is not in acute distress. Appearance: Normal appearance. She is well-developed. HENT:      Head: Normocephalic and atraumatic. Right Ear: Tympanic membrane and external ear normal.      Left Ear: Tympanic membrane and external ear normal.      Nose: Nose normal.   Eyes:      General: No scleral icterus. Pupils: Pupils are equal, round, and reactive to light. Neck:      Vascular: No carotid bruit (neg luz elena). Cardiovascular:      Rate and Rhythm: Normal rate and regular rhythm. Heart sounds: Normal heart sounds. No murmur heard. Pulmonary:      Effort: Pulmonary effort is normal. No respiratory distress. Breath sounds: Normal breath sounds. No wheezing. Abdominal:      Palpations: Abdomen is soft. Tenderness: There is no abdominal tenderness. Musculoskeletal:         General: Normal range of motion. Cervical back: Normal range of motion and neck supple. Right lower leg: No edema. Left lower leg: No edema.    Lymphadenopathy: Cervical: No cervical adenopathy. Skin:     General: Skin is warm and dry. Findings: No rash. Neurological:      Mental Status: She is alert and oriented to person, place, and time. Psychiatric:         Behavior: Behavior normal.         Thought Content: Thought content normal.         Judgment: Judgment normal.           Diagnostic Data:  Lab Results   Component Value Date    GLUCOSE 86 06/16/2021     Lab Results   Component Value Date    BUN 16 06/16/2021    CREATININE 0.83 06/16/2021     06/16/2021    K 4.2 06/16/2021    CALCIUM 10.1 06/16/2021     06/16/2021    CO2 27 06/16/2021    LABGLOM >60 06/16/2021     Lab Results   Component Value Date    CHOL 143 06/16/2021    HDL 75 06/16/2021    LDLCHOLESTEROL 57 06/16/2021    TRIG 54 06/16/2021    ALT 18 06/16/2021    AST 31 06/16/2021     Lab Results   Component Value Date    TSH 2.28 09/17/2019     1. Essential hypertension  Stable and controlled  - CBC Auto Differential; Future  - Basic Metabolic Panel; Future    2. Pure hypercholesterolemia  Stable ldl  Remain active. Lab Results   Component Value Date    LDLCHOLESTEROL 57 06/16/2021     - Lipid Panel; Future  - Hepatic Function Panel; Future  - Basic Metabolic Panel; Future    3. Hx of discoid lupus erythematosus    - CBC Auto Differential; Future    4. Vitamin B 12 deficiency  Continue supplement    5. Anxiety  Well controlled on Tranxene follows with psych Dr. Christopher Rodrigez     6.  Primary osteoarthritis involving multiple joints  Remains active   On plaquenil         · Health Maintenance:  · Discussed health maintenance issues: cervical cancer screening and patient is up to date for health maintenance items  · Discussed immunization schedule: recommend annual flu vaccine and patient is up to date for vaccinations    · Follow Up  · Labs: BMP, A1c, fasting lipids, LFT's and thyroid levels to be drawn in 6 months  · Follow up appt to be scheduled in 6 months to f/u labs    Electronically signed by Luz Elena Hamilton, JESSENIA - CNP on 10/10/2021 at 3:01 PM

## 2021-10-10 ASSESSMENT — ENCOUNTER SYMPTOMS
ABDOMINAL DISTENTION: 0
BACK PAIN: 0
SHORTNESS OF BREATH: 0
NAUSEA: 0
COUGH: 0
EYES NEGATIVE: 1

## 2021-10-27 ENCOUNTER — HOSPITAL ENCOUNTER (OUTPATIENT)
Dept: GENERAL RADIOLOGY | Age: 66
Discharge: HOME | End: 2021-10-29
Payer: MEDICARE

## 2021-10-27 ENCOUNTER — HOSPITAL ENCOUNTER (OUTPATIENT)
Age: 66
Discharge: HOME | End: 2021-10-29
Payer: MEDICARE

## 2021-10-27 DIAGNOSIS — Z96.652 HISTORY OF TOTAL LEFT KNEE REPLACEMENT: ICD-10-CM

## 2021-10-27 PROCEDURE — 73564 X-RAY EXAM KNEE 4 OR MORE: CPT

## 2021-12-02 DIAGNOSIS — I10 ESSENTIAL HYPERTENSION: ICD-10-CM

## 2021-12-03 RX ORDER — LISINOPRIL 5 MG/1
5 TABLET ORAL DAILY
Qty: 90 TABLET | Refills: 3 | Status: SHIPPED | OUTPATIENT
Start: 2021-12-03 | End: 2022-10-11 | Stop reason: SDUPTHER

## 2021-12-03 RX ORDER — FUROSEMIDE 20 MG/1
20 TABLET ORAL DAILY PRN
Qty: 90 TABLET | Refills: 1 | Status: SHIPPED | OUTPATIENT
Start: 2021-12-03 | End: 2022-05-25

## 2022-01-12 ENCOUNTER — HOSPITAL ENCOUNTER (OUTPATIENT)
Age: 67
Setting detail: SPECIMEN
Discharge: HOME | End: 2022-01-12
Payer: MEDICARE

## 2022-01-12 DIAGNOSIS — Z87.2 HX OF DISCOID LUPUS ERYTHEMATOSUS: ICD-10-CM

## 2022-01-12 DIAGNOSIS — I10 ESSENTIAL HYPERTENSION: ICD-10-CM

## 2022-01-12 DIAGNOSIS — E78.00 PURE HYPERCHOLESTEROLEMIA: ICD-10-CM

## 2022-01-12 LAB
ABSOLUTE EOS #: 0.04 K/UL (ref 0–0.44)
ABSOLUTE IMMATURE GRANULOCYTE: <0.03 K/UL (ref 0–0.3)
ABSOLUTE LYMPH #: 1.21 K/UL (ref 1.1–3.7)
ABSOLUTE MONO #: 0.51 K/UL (ref 0.1–1.2)
ALBUMIN SERPL-MCNC: 4.5 G/DL (ref 3.5–5.2)
ALBUMIN/GLOBULIN RATIO: 1.5 (ref 1–2.5)
ALP BLD-CCNC: 77 U/L (ref 35–104)
ALT SERPL-CCNC: 25 U/L (ref 5–33)
ANION GAP SERPL CALCULATED.3IONS-SCNC: 13 MMOL/L (ref 9–17)
AST SERPL-CCNC: 43 U/L
BASOPHILS # BLD: 1 % (ref 0–2)
BASOPHILS ABSOLUTE: 0.1 K/UL (ref 0–0.2)
BILIRUB SERPL-MCNC: 0.34 MG/DL (ref 0.3–1.2)
BILIRUBIN DIRECT: <0.08 MG/DL
BILIRUBIN, INDIRECT: ABNORMAL MG/DL (ref 0–1)
BUN BLDV-MCNC: 22 MG/DL (ref 8–23)
BUN/CREAT BLD: 26 (ref 9–20)
CALCIUM SERPL-MCNC: 10.4 MG/DL (ref 8.6–10.4)
CHLORIDE BLD-SCNC: 103 MMOL/L (ref 98–107)
CHOLESTEROL/HDL RATIO: 1.9
CHOLESTEROL: 153 MG/DL
CO2: 24 MMOL/L (ref 20–31)
CREAT SERPL-MCNC: 0.84 MG/DL (ref 0.5–0.9)
DIFFERENTIAL TYPE: ABNORMAL
EOSINOPHILS RELATIVE PERCENT: 1 % (ref 1–4)
GFR AFRICAN AMERICAN: >60 ML/MIN
GFR NON-AFRICAN AMERICAN: >60 ML/MIN
GFR SERPL CREATININE-BSD FRML MDRD: ABNORMAL ML/MIN/{1.73_M2}
GFR SERPL CREATININE-BSD FRML MDRD: ABNORMAL ML/MIN/{1.73_M2}
GLOBULIN: ABNORMAL G/DL (ref 1.5–3.8)
GLUCOSE BLD-MCNC: 78 MG/DL (ref 70–99)
HCT VFR BLD CALC: 45 % (ref 36.3–47.1)
HDLC SERPL-MCNC: 79 MG/DL
HEMOGLOBIN: 14.5 G/DL (ref 11.9–15.1)
IMMATURE GRANULOCYTES: 0 %
LDL CHOLESTEROL: 65 MG/DL (ref 0–130)
LYMPHOCYTES # BLD: 14 % (ref 24–43)
MCH RBC QN AUTO: 32 PG (ref 25.2–33.5)
MCHC RBC AUTO-ENTMCNC: 32.2 G/DL (ref 28.4–34.8)
MCV RBC AUTO: 99.3 FL (ref 82.6–102.9)
MONOCYTES # BLD: 6 % (ref 3–12)
NRBC AUTOMATED: 0 PER 100 WBC
PDW BLD-RTO: 14.2 % (ref 11.8–14.4)
PLATELET # BLD: 212 K/UL (ref 138–453)
PLATELET ESTIMATE: ABNORMAL
PMV BLD AUTO: 11.5 FL (ref 8.1–13.5)
POTASSIUM SERPL-SCNC: 4.8 MMOL/L (ref 3.7–5.3)
RBC # BLD: 4.53 M/UL (ref 3.95–5.11)
RBC # BLD: ABNORMAL 10*6/UL
SEG NEUTROPHILS: 78 % (ref 36–65)
SEGMENTED NEUTROPHILS ABSOLUTE COUNT: 6.67 K/UL (ref 1.5–8.1)
SODIUM BLD-SCNC: 140 MMOL/L (ref 135–144)
TOTAL PROTEIN: 7.5 G/DL (ref 6.4–8.3)
TRIGL SERPL-MCNC: 46 MG/DL
VLDLC SERPL CALC-MCNC: NORMAL MG/DL (ref 1–30)
WBC # BLD: 8.6 K/UL (ref 3.5–11.3)
WBC # BLD: ABNORMAL 10*3/UL

## 2022-01-12 PROCEDURE — 80061 LIPID PANEL: CPT

## 2022-01-12 PROCEDURE — 80048 BASIC METABOLIC PNL TOTAL CA: CPT

## 2022-01-12 PROCEDURE — 85025 COMPLETE CBC W/AUTO DIFF WBC: CPT

## 2022-01-12 PROCEDURE — 80076 HEPATIC FUNCTION PANEL: CPT

## 2022-02-21 RX ORDER — AMLODIPINE BESYLATE 10 MG/1
10 TABLET ORAL DAILY
Qty: 90 TABLET | Refills: 3 | Status: SHIPPED | OUTPATIENT
Start: 2022-02-21

## 2022-02-21 NOTE — TELEPHONE ENCOUNTER
Last OV: 10/8/2021  Last RX:    Next scheduled apt: 4/6/2022       Rx refill requested through [facility] for:  Amlodipine 10mg    Rx pending.

## 2022-03-03 NOTE — PRE-CERTIFICATION NOTE
Medicare Cap     [] Physical Therapy  [] Speech Therapy  [] Occupational therapy    *PT and Speech caps combine      $1940 Cap limit < kx modifier needed < $0268 requires pre-cert     Patient Name: Orlando Harkins  YOB: 1955     Date of [address] Name $$$ charge Daily Charge YTD   Total $   3/13/19 Reagan, ex 83.06, 30.16 113.22 113.22   3/15/19 Ex x 3 30.16 x 3 90.48 203.70   3/18/19 Ex x 3 30.16 x 3 90.48 294.18   3/20/19 Ex x3 30.16 x 3 90.48 384.66   3/22/19 Ex x 3 30.16 x 3 90.48 475.14   3/25/19 Ex x 3 30.16 x 3 90.48 565.62   3/27/29 Ex x3 30.16 x 3 90.48 656.10   3/29/19 Ex x3 30.16 x 3 90.48 746.58   4/1/19 Ex x 2, man 30.16 x 2, 27.43 87.75 834.33   4/3/19 Ex x 2, man x 1 Ear Star Wedge Flap Text: The defect edges were debeveled with a #15 blade scalpel.  Given the location of the defect and the proximity to free margins (helical rim) an ear star wedge flap was deemed most appropriate.  Using a sterile surgical marker, the appropriate flap was drawn incorporating the defect and placing the expected incisions between the helical rim and antihelix where possible.  The area thus outlined was incised through and through with a #15 scalpel blade.

## 2022-04-06 ENCOUNTER — OFFICE VISIT (OUTPATIENT)
Dept: PRIMARY CARE CLINIC | Age: 67
End: 2022-04-06
Payer: MEDICARE

## 2022-04-06 VITALS
HEIGHT: 67 IN | BODY MASS INDEX: 23.23 KG/M2 | OXYGEN SATURATION: 95 % | WEIGHT: 148 LBS | SYSTOLIC BLOOD PRESSURE: 110 MMHG | DIASTOLIC BLOOD PRESSURE: 74 MMHG | HEART RATE: 60 BPM

## 2022-04-06 DIAGNOSIS — Z78.0 POSTMENOPAUSAL ESTROGEN DEFICIENCY: ICD-10-CM

## 2022-04-06 DIAGNOSIS — Z13.820 SCREENING FOR OSTEOPOROSIS: ICD-10-CM

## 2022-04-06 DIAGNOSIS — Z00.00 MEDICARE ANNUAL WELLNESS VISIT, SUBSEQUENT: Primary | ICD-10-CM

## 2022-04-06 DIAGNOSIS — Z87.2 HX OF DISCOID LUPUS ERYTHEMATOSUS: ICD-10-CM

## 2022-04-06 DIAGNOSIS — F41.9 ANXIETY: ICD-10-CM

## 2022-04-06 DIAGNOSIS — Z12.31 BREAST CANCER SCREENING BY MAMMOGRAM: ICD-10-CM

## 2022-04-06 PROCEDURE — 1123F ACP DISCUSS/DSCN MKR DOCD: CPT | Performed by: NURSE PRACTITIONER

## 2022-04-06 PROCEDURE — 3017F COLORECTAL CA SCREEN DOC REV: CPT | Performed by: NURSE PRACTITIONER

## 2022-04-06 PROCEDURE — G0439 PPPS, SUBSEQ VISIT: HCPCS | Performed by: NURSE PRACTITIONER

## 2022-04-06 PROCEDURE — 4040F PNEUMOC VAC/ADMIN/RCVD: CPT | Performed by: NURSE PRACTITIONER

## 2022-04-06 ASSESSMENT — PATIENT HEALTH QUESTIONNAIRE - PHQ9
SUM OF ALL RESPONSES TO PHQ QUESTIONS 1-9: 0
SUM OF ALL RESPONSES TO PHQ9 QUESTIONS 1 & 2: 0
2. FEELING DOWN, DEPRESSED OR HOPELESS: 0
1. LITTLE INTEREST OR PLEASURE IN DOING THINGS: 0

## 2022-04-06 ASSESSMENT — LIFESTYLE VARIABLES
HOW OFTEN DO YOU HAVE A DRINK CONTAINING ALCOHOL: MONTHLY OR LESS
HOW MANY STANDARD DRINKS CONTAINING ALCOHOL DO YOU HAVE ON A TYPICAL DAY: 3 OR 4

## 2022-04-06 NOTE — PROGRESS NOTES
Medicare Annual Wellness Visit    Rd Ervin is here for Medicare AWV (Pt states no concerns at this time)    Assessment & Plan   Medicare annual wellness visit, subsequent  Breast cancer screening by mammogram  -     FADI MARGRET DIGITAL SCREEN BILATERAL; Future  Screening for osteoporosis  -     DEXA BONE DENSITY 2 SITES; Future  Postmenopausal estrogen deficiency  -     DEXA BONE DENSITY 2 SITES; Future  Hx of discoid lupus erythematosus  Anxiety      Recommendations for Preventive Services Due: see orders and patient instructions/AVS.  Recommended screening schedule for the next 5-10 years is provided to the patient in written form: see Patient Instructions/AVS.     Return for Medicare Annual Wellness Visit in 1 year. Subjective   The following acute and/or chronic problems were also addressed today:    Patient's complete Health Risk Assessment and screening values have been reviewed and are found in Flowsheets. The following problems were reviewed today and where indicated follow up appointments were made and/or referrals ordered. Positive Risk Factor Screenings with Interventions:                Safety:  Do you have working smoke detectors?: Yes  Do you have any tripping hazards - loose or unsecured carpets or rugs?: No  Do you have any tripping hazards - clutter in doorways, halls, or stairs?: No  Do you have either shower bars, grab bars, non-slip mats or non-slip surfaces in your shower or bathtub?: (!) No  Do all of your stairways have a railing or banister?: Yes  Do you always fasten your seatbelt when you are in a car?: Yes    Safety Interventions:  · Home safety tips provided           Objective   Vitals:    04/06/22 0933   BP: 110/74   Pulse: 60   SpO2: 95%   Weight: 148 lb (67.1 kg)   Height: 5' 7\" (1.702 m)      Body mass index is 23.18 kg/m².         General Appearance: alert and oriented to person, place and time, well developed and well- nourished, in no acute distress  Skin: warm and dry, no rash or erythema  Head: normocephalic and atraumatic  Eyes: pupils equal, round, and reactive to light, extraocular eye movements intact, conjunctivae normal  ENT: tympanic membrane, external ear and ear canal normal bilaterally, nose without deformity  Neck: supple and non-tender without mass, no thyromegaly or thyroid nodules, no cervical lymphadenopathy  Pulmonary/Chest: clear to auscultation bilaterally- no wheezes, rales or rhonchi, normal air movement, no respiratory distress  Cardiovascular: normal rate, regular rhythm, normal S1 and S2, no murmurs, rubs, clicks, or gallops, distal pulses intact, no carotid bruits  Abdomen: soft, non-tender, non-distended, normal bowel sounds  Extremities: no cyanosis, clubbing or edema  Musculoskeletal: normal range of motion, no joint swelling, deformity or tenderness  Neurologic:  gait, coordination and speech normal       Allergies   Allergen Reactions    Augmentin [Amoxicillin-Pot Clavulanate]      Gave Diarhhea       Prior to Visit Medications    Medication Sig Taking?  Authorizing Provider   amLODIPine (NORVASC) 10 MG tablet TAKE 1 TABLET BY MOUTH  DAILY Yes JESSENIA Vázquez CNP   lisinopril (PRINIVIL;ZESTRIL) 5 MG tablet TAKE 1 TABLET BY MOUTH  DAILY Yes JESSENIA Vázquez CNP   furosemide (LASIX) 20 MG tablet Take 1 tablet by mouth daily as needed (leg swelling) Yes JESSENIA Vázquez CNP   rosuvastatin (CRESTOR) 20 MG tablet Take 1 tablet by mouth daily Yes JESSENIA Vázquez CNP   oxybutynin (DITROPAN-XL) 5 MG extended release tablet Take 1 tablet by mouth daily Yes JESSENIA Vázquez CNP   clorazepate (TRANXENE) 7.5 MG tablet TAKE 1 TABLET BY MOUTH EVERY DAY Yes Historical Provider, MD   hydroxychloroquine (PLAQUENIL) 200 MG tablet take 1 tablet by mouth once daily Yes Historical Provider, MD   cetirizine (ZYRTEC) 10 MG tablet Take 10 mg by mouth daily Yes Historical Provider, MD   Glucosamine 500 MG CAPS Take 2,000 tablets by mouth daily Yes Historical Provider, MD   Coenzyme Q10 (CO Q-10) 200 MG CAPS Take 1 capsule by mouth Yes Historical Provider, MD   NONFORMULARY Indications: Womens 1 a day 50 plus. Yes Historical Provider, MD   NONFORMULARY Indications: N[address] way hair skin and nails  2500 mcg 1 a day. Yes Historical Provider, MD   vitamin B-12 (CYANOCOBALAMIN) 500 MCG tablet Take 500 mcg by mouth daily Yes Historical Provider, MD   Polypodium Leucotomos (HELIOCARE PO) Take 1 tablet by mouth daily Yes Historical Provider, MD   Magnesium 400 MG CAPS Take  by mouth daily. Yes Historical Provider, MD   fish oil-omega-3 fatty acids 1000 MG capsule Take 1 g by mouth 2 times daily. Yes Historical Provider, MD   vitamin D (CHOLECALCIFEROL) 400 UNITS TABS tablet Take 5,000 Units by mouth daily  Yes Historical Provider, MD   calcium-vitamin D (OSCAL) 250-125 MG-UNIT per tablet Take 1 tablet by mouth daily Vit D and Vit K 750 mg daily, takes 3 a day now Yes Historical Provider, MD   Ascorbic Acid (VITAMIN C) 500 MG tablet Take 500 mg by mouth daily. Yes Historical Provider, MD [place] (Including outside providers/suppliers regularly involved in providing care):   Patient Care Team:  JESSENIA Patten CNP as PCP - General (Certified Nurse Practitioner)  JESSENIA Patten CNP as PCP - [facility]    Reviewed and updated this visit:  Tobacco  Allergies  Meds  Problems  Med Hx  Surg Hx  Soc Hx  Fam Hx         Medical health review for surgical clearance:    1. Anxiety:  Dr. Sri Quevedo with tranxene anxiety well controlled    2. Hypertension: well controlled with current medications   ? Medications: continue current medication doses   Lasix, amlodipine, lisinopril.      3. Hyperlipidemia: well controlled with current medications   ? Medications: continue current medication doses   ? Recommended low cholesterol diet - continue crestor.       · Nutrition Status:  well developed, well nourished   ?  Recommend continue current healthy lifestyle with diet and regular exercise    BMI 23.18 exercises at gym and does weights routinely.      Discoid LUPUS- follows with Dermatology- Dr. Villa Found. (prior followed with Dr. Guanako David)   Remains on plaquenil (close monitoring liver function tests and CBC ) in addition to routine eye exams. Lab Results   Component Value Date    ALT 25 01/12/2022    AST 43 (H) 01/12/2022    ALKPHOS 77 01/12/2022    BILITOT 0.34 01/12/2022     Lab Results   Component Value Date    WBC 8.6 01/12/2022    HGB 14.5 01/12/2022    HCT 45.0 01/12/2022    MCV 99.3 01/12/2022     01/12/2022     5. Osteoarthritis with hx Left TKA 10/2020 has done very well worked on weight loss and muscle strength with routine exercises and healthy lifestyle choices. 6. Skin cancer screening follows routinely due to hx melanoma on chest in past.     7. dExa in 2016 normal, post menopausal on vitamin D and calcium supplement   8. Urinary incontinence uses ditropan with good control. Mammogram up to date due 5/2022. Pt risks for surgery are well controlled , cleared for surgery.      Magy Martinez APRN CNP

## 2022-04-06 NOTE — PATIENT INSTRUCTIONS
You may be receiving a survey from [employer] regarding your visit today. You may get this in the mail, through your MyChart or in your email. Please complete the survey to enable us to provide the highest quality of care to you and your family. If you cannot score us as very good ( 5 Stars) on any question, please feel free to call the office to discuss how we could have made your experience exceptional.     Thank You! Saint Bran, APRVINI-CNP  [address] Pieter Weller LPN        Phone: [phone]  Fax: [phone] [facility] Office Hours:  Monday: [facility] office location 8-5 ([phone]) Offering additional late hours the first Monday of the month until 7 pm.   Tuesday: 8-5 Wednesday: 8-5 Thursday:  Additional hours offered 2 Thursdays a month. Please call to inquire those dates. Fridays: 7:30-4:30      Please check if you had the pneumovax 23 vaccine  Since turning 70years of age. Preventing falls is a special concern due to an increase in the likelihood of fracturing a bone. Factors affecting FALLS include: environmental factors, impaired vision or balance, chronic diseases that affect mental or physical functioning and certain medications (sedatives, antidepressants, benzodiazepines, narcotics and alcohol).    Here are some tips to eliminate environmental factors that can lead to falls    Outdoors:  -use a cane or walker for added stability  -wear rubber-soled shoes for traction  -walk on grass when sidewalks are slippery  -In winter, carry salt or verito litter to sprinkle on slippery sidewalks.   -be careful on highly polished floors that become slick and dangerous when wet  -walk on even or level ground when able    Indoors:  -Keep rooms free of clutter, especially the floors  -Keep floor surfaces smooth but not slippery  -Wear supportive, low-heeled shoes even at home  -Avoid walking in socks, stockings or slippers.  -Be sure carpets and area rugs have to meet your calcium requirement with diet alone, but a vitamin D supplement is usually necessary to meet this goal.  · When exposed to the sun, use a sunscreen that protects against both UVA and UVB radiation with an SPF of 30 or greater. Reapply every 2 to 3 hours or after sweating, drying off with a towel, or swimming. · Always wear a seat belt when traveling in a car. Always wear a helmet when riding a bicycle or motorcycle.

## 2022-04-26 ENCOUNTER — TELEPHONE (OUTPATIENT)
Dept: PRIMARY CARE CLINIC | Age: 67
End: 2022-04-26

## 2022-04-26 NOTE — TELEPHONE ENCOUNTER
Dr Neto Peterson office called and asked if provider was comfortable giving surgical clearance for OP surgery. Tentatively looking at a May 9th surgery date. I informed office we would confirm tomorrow due to provider being out of the office today.

## 2022-04-27 NOTE — TELEPHONE ENCOUNTER
Informed Riley's office Crystal Lowery will send clearance, and to have pt complete any pre-op labs that are needed.

## 2022-04-27 NOTE — TELEPHONE ENCOUNTER
Pt was just seen no appt needed will send clearance. Get preop labs done if available please.      Back to me to complete

## 2022-04-28 ENCOUNTER — TELEPHONE (OUTPATIENT)
Dept: PRIMARY CARE CLINIC | Age: 67
End: 2022-04-28

## 2022-04-28 NOTE — TELEPHONE ENCOUNTER
Dr Neto Peterson office called to let us know pt will be doing pre-op testing/labs on May 4th. They are aware provider is out of office that day and will return May 9th, can review results on return and fax surgical clearance at that time. Surgery scheduled for May 18th.

## 2022-05-02 NOTE — TELEPHONE ENCOUNTER
Inform patient updated 4/6/22 OV for surgical clearance, I need to know with her weight loss if she is still using her CPAP machine for sleep apnea? If so she has lost > 20 pounds weight since last titration should do that. If she is not using it I need to know due to correcting note. (my last notation stated she was still using it. Let me know if she is using it send OV note with clearance to Dr. Parth Luque please and inform patient.      Magy RUELAS CNP

## 2022-05-04 ENCOUNTER — HOSPITAL ENCOUNTER (OUTPATIENT)
Dept: BONE DENSITY | Age: 67
Discharge: HOME | End: 2022-05-06
Payer: MEDICARE

## 2022-05-04 ENCOUNTER — HOSPITAL ENCOUNTER (OUTPATIENT)
Dept: MAMMOGRAPHY | Age: 67
Discharge: HOME | End: 2022-05-06
Payer: MEDICARE

## 2022-05-04 DIAGNOSIS — Z12.31 BREAST CANCER SCREENING BY MAMMOGRAM: ICD-10-CM

## 2022-05-04 DIAGNOSIS — Z78.0 POSTMENOPAUSAL ESTROGEN DEFICIENCY: ICD-10-CM

## 2022-05-04 DIAGNOSIS — Z13.820 SCREENING FOR OSTEOPOROSIS: ICD-10-CM

## 2022-05-04 PROCEDURE — 77080 DXA BONE DENSITY AXIAL: CPT

## 2022-05-04 PROCEDURE — 77063 BREAST TOMOSYNTHESIS BI: CPT

## 2022-05-05 ENCOUNTER — HOSPITAL ENCOUNTER (OUTPATIENT)
Age: 67
Discharge: HOME | End: 2022-05-05
Payer: MEDICARE

## 2022-05-05 ENCOUNTER — HOSPITAL ENCOUNTER (OUTPATIENT)
Age: 67
Discharge: HOME | End: 2022-05-07
Payer: MEDICARE

## 2022-05-05 ENCOUNTER — HOSPITAL ENCOUNTER (OUTPATIENT)
Dept: GENERAL RADIOLOGY | Age: 67
Discharge: HOME | End: 2022-05-07
Payer: MEDICARE

## 2022-05-05 DIAGNOSIS — Z01.811 PRE-OP CHEST EXAM: ICD-10-CM

## 2022-05-05 LAB
ANION GAP SERPL CALCULATED.3IONS-SCNC: 10 MMOL/L (ref 9–17)
BUN BLDV-MCNC: 15 MG/DL (ref 8–23)
BUN/CREAT BLD: 17 (ref 9–20)
CALCIUM SERPL-MCNC: 9.3 MG/DL (ref 8.6–10.4)
CHLORIDE BLD-SCNC: 103 MMOL/L (ref 98–107)
CO2: 26 MMOL/L (ref 20–31)
CREAT SERPL-MCNC: 0.87 MG/DL (ref 0.5–0.9)
GFR AFRICAN AMERICAN: >60 ML/MIN
GFR NON-AFRICAN AMERICAN: >60 ML/MIN
GFR SERPL CREATININE-BSD FRML MDRD: NORMAL ML/MIN/{1.73_M2}
GLUCOSE BLD-MCNC: 97 MG/DL (ref 70–99)
HCT VFR BLD CALC: 41.8 % (ref 36–46)
HEMOGLOBIN: 13.6 G/DL (ref 12–16)
MCH RBC QN AUTO: 30.5 PG (ref 26–34)
MCHC RBC AUTO-ENTMCNC: 32.5 G/DL (ref 31–37)
MCV RBC AUTO: 94 FL (ref 80–100)
PDW BLD-RTO: 14.8 % (ref 12.1–15.2)
PLATELET # BLD: 160 K/UL (ref 140–450)
POTASSIUM SERPL-SCNC: 4.1 MMOL/L (ref 3.7–5.3)
RBC # BLD: 4.45 M/UL (ref 4–5.2)
SODIUM BLD-SCNC: 139 MMOL/L (ref 135–144)
WBC # BLD: 5.3 K/UL (ref 3.5–11)

## 2022-05-05 PROCEDURE — 36415 COLL VENOUS BLD VENIPUNCTURE: CPT

## 2022-05-05 PROCEDURE — 93005 ELECTROCARDIOGRAM TRACING: CPT | Performed by: PODIATRIST

## 2022-05-05 PROCEDURE — 71046 X-RAY EXAM CHEST 2 VIEWS: CPT

## 2022-05-05 PROCEDURE — 85027 COMPLETE CBC AUTOMATED: CPT

## 2022-05-05 PROCEDURE — 80048 BASIC METABOLIC PNL TOTAL CA: CPT

## 2022-05-08 LAB
EKG ATRIAL RATE: 56 BPM
EKG P AXIS: 57 DEGREES
EKG P-R INTERVAL: 158 MS
EKG Q-T INTERVAL: 428 MS
EKG QRS DURATION: 82 MS
EKG QTC CALCULATION (BAZETT): 413 MS
EKG R AXIS: 57 DEGREES
EKG T AXIS: 60 DEGREES
EKG VENTRICULAR RATE: 56 BPM

## 2022-05-08 PROCEDURE — 93010 ELECTROCARDIOGRAM REPORT: CPT | Performed by: INTERNAL MEDICINE

## 2022-05-09 ENCOUNTER — TELEPHONE (OUTPATIENT)
Dept: FAMILY MEDICINE CLINIC | Age: 67
End: 2022-05-09

## 2022-05-09 DIAGNOSIS — R94.31 ABNORMAL EKG: Primary | ICD-10-CM

## 2022-05-09 DIAGNOSIS — M32.9 SYSTEMIC LUPUS ERYTHEMATOSUS, UNSPECIFIED SLE TYPE, UNSPECIFIED ORGAN INVOLVEMENT STATUS (HCC): ICD-10-CM

## 2022-05-09 NOTE — TELEPHONE ENCOUNTER
Dr. Divya Coelho is requesting PCP review patient recent testing due to abnormal cardiac testing - needs new clearance letter written

## 2022-05-10 NOTE — TELEPHONE ENCOUNTER
Refer to cardiology, for abnormal EKG and lupus pt is asymptomatic.  I spoke with Mariam Hooker and she will inform patient. '

## 2022-05-11 DIAGNOSIS — E55.9 VITAMIN D DEFICIENCY: ICD-10-CM

## 2022-05-11 DIAGNOSIS — Z01.818 PRE-OPERATIVE CLEARANCE: ICD-10-CM

## 2022-05-11 DIAGNOSIS — R94.31 ABNORMAL EKG: ICD-10-CM

## 2022-05-11 DIAGNOSIS — I10 PRIMARY HYPERTENSION: Primary | ICD-10-CM

## 2022-05-11 DIAGNOSIS — Z13.220 ENCOUNTER FOR LIPID SCREENING FOR CARDIOVASCULAR DISEASE: ICD-10-CM

## 2022-05-11 DIAGNOSIS — Z13.6 ENCOUNTER FOR LIPID SCREENING FOR CARDIOVASCULAR DISEASE: ICD-10-CM

## 2022-05-12 ENCOUNTER — HOSPITAL ENCOUNTER (OUTPATIENT)
Age: 67
Discharge: HOME | End: 2022-05-12
Payer: MEDICARE

## 2022-05-12 DIAGNOSIS — Z13.220 ENCOUNTER FOR LIPID SCREENING FOR CARDIOVASCULAR DISEASE: ICD-10-CM

## 2022-05-12 DIAGNOSIS — Z01.818 PRE-OPERATIVE CLEARANCE: ICD-10-CM

## 2022-05-12 DIAGNOSIS — R94.31 ABNORMAL EKG: ICD-10-CM

## 2022-05-12 DIAGNOSIS — I10 PRIMARY HYPERTENSION: ICD-10-CM

## 2022-05-12 DIAGNOSIS — E55.9 VITAMIN D DEFICIENCY: ICD-10-CM

## 2022-05-12 DIAGNOSIS — Z13.6 ENCOUNTER FOR LIPID SCREENING FOR CARDIOVASCULAR DISEASE: ICD-10-CM

## 2022-05-12 LAB
CHOLESTEROL/HDL RATIO: 2.2
CHOLESTEROL: 147 MG/DL
HDLC SERPL-MCNC: 66 MG/DL
LDL CHOLESTEROL: 69 MG/DL (ref 0–130)
MAGNESIUM: 2 MG/DL (ref 1.6–2.6)
TRIGL SERPL-MCNC: 60 MG/DL
TSH SERPL DL<=0.05 MIU/L-ACNC: 2.06 UIU/ML (ref 0.3–5)
VITAMIN D 25-HYDROXY: 73.1 NG/ML

## 2022-05-12 PROCEDURE — 36415 COLL VENOUS BLD VENIPUNCTURE: CPT

## 2022-05-12 PROCEDURE — 83735 ASSAY OF MAGNESIUM: CPT

## 2022-05-12 PROCEDURE — 84443 ASSAY THYROID STIM HORMONE: CPT

## 2022-05-12 PROCEDURE — 82306 VITAMIN D 25 HYDROXY: CPT

## 2022-05-12 PROCEDURE — 80061 LIPID PANEL: CPT

## 2022-05-16 ENCOUNTER — OFFICE VISIT (OUTPATIENT)
Dept: CARDIOLOGY CLINIC | Age: 67
End: 2022-05-16
Payer: MEDICARE

## 2022-05-16 VITALS
WEIGHT: 153 LBS | OXYGEN SATURATION: 97 % | DIASTOLIC BLOOD PRESSURE: 80 MMHG | BODY MASS INDEX: 23.96 KG/M2 | HEART RATE: 55 BPM | SYSTOLIC BLOOD PRESSURE: 150 MMHG

## 2022-05-16 DIAGNOSIS — R94.31 ABNORMAL EKG: Primary | ICD-10-CM

## 2022-05-16 PROCEDURE — G8427 DOCREV CUR MEDS BY ELIG CLIN: HCPCS | Performed by: INTERNAL MEDICINE

## 2022-05-16 PROCEDURE — 1036F TOBACCO NON-USER: CPT | Performed by: INTERNAL MEDICINE

## 2022-05-16 PROCEDURE — G8420 CALC BMI NORM PARAMETERS: HCPCS | Performed by: INTERNAL MEDICINE

## 2022-05-16 PROCEDURE — 3017F COLORECTAL CA SCREEN DOC REV: CPT | Performed by: INTERNAL MEDICINE

## 2022-05-16 PROCEDURE — 1090F PRES/ABSN URINE INCON ASSESS: CPT | Performed by: INTERNAL MEDICINE

## 2022-05-16 PROCEDURE — 99204 OFFICE O/P NEW MOD 45 MIN: CPT | Performed by: INTERNAL MEDICINE

## 2022-05-16 PROCEDURE — 4040F PNEUMOC VAC/ADMIN/RCVD: CPT | Performed by: INTERNAL MEDICINE

## 2022-05-16 PROCEDURE — G8399 PT W/DXA RESULTS DOCUMENT: HCPCS | Performed by: INTERNAL MEDICINE

## 2022-05-16 PROCEDURE — 1123F ACP DISCUSS/DSCN MKR DOCD: CPT | Performed by: INTERNAL MEDICINE

## 2022-05-16 RX ORDER — ASPIRIN 81 MG/1
81 TABLET ORAL DAILY
COMMUNITY

## 2022-05-16 NOTE — PROGRESS NOTES
Ov DR Madelin Bennett consult  Cardiac clearance for  Lt foot bunionectomy  And hammer toe   Per DR Phoenix Luong.   Had abnormal EKG . No chest pain or sob  No dizziness. Couple weeks ago   Cold sx had mucinex  Pt concerned may have  Contributed to abnormal  ekg  Exercises at the gym   3 times a week  Seen DR Enoc Velasco in    For chest pain. Family history: mother had bypass. Selam Tinajero was a pt of Dr Delfin Mayen  All aunt and uncles   Heart disease   Pt has one sister no heart disease  Father had enlarged heart.  with 2 daughters Augusto Nassar and Modesto. Both in [place]. Lady runs camera at sporting events. Jacklyn . Quit smoking few years ago. occ alcohol. Bedside echo done. Pt is cleared for surgery. Will do lexiscan due to family  History  Will try to do tomorrow.

## 2022-05-16 NOTE — LETTER
Mauri Frost MD  [facility]  [address], [place]  [phone]    May 16, 2022    Namita Cantrell DPM  [facility] [address], [place] [address]    RE:   Mary Davis  :  1955      Dear Dr. Valencia Sites:    CHIEF COMPLAINT:  1. Abnormal EKG. 2.  Tremendous family history of coronary artery disease. 3.  Hyperlipidemia, under good control. 4.  Hypertension, under good control. HISTORY OF PRESENT ILLNESS:  I had the pleasure of seeing Mrs. Jacinta Mei in our office on 2022. She is a very pleasant 59-year-old female who has a tremendous family history of coronary artery disease. Her mother and all her mother's sisters had coronary artery disease. Her mother was Zounds _____, who we had been following. She did see Dr. Matt Blackman in , because of chest pain. He did a Lexiscan stress test which was normal.    In , she had another Lexiscan cardiac stress test and at this time, it showed possible inferior wall ischemia. However, because her pain was atypical, no cardiac catheterization was done. She is pending having a left foot bunionectomy and hammertoe surgery by Dr. Erik Saeed for 2022. An EKG was done that was abnormal showing nonspecific ST changes and an old anterior myocardial infarction. I was asked to see her in consult. She denies any myocardial infarction. She has occasional atypical chest pain not necessarily related to activity. She does workout, exercising three times a week at a gym. She does both weightlifting and cardio. She denies any syncope, near syncope, or lightheadedness, and she has had no palpitations. She has been treated for hyperlipidemia with Crestor and it is under good control. She has also been treated for hypertension which is also under excellent control. She stopped smoking in . She does have discoid lupus.     CARDIAC RISK FACTORS:  Other Family Members: Positive. Hypertension:  Positive. Hyperlipidemia:  Positive. Autoimmune Disease:  Positive. Smoking:  Negative. Diabetes:  Negative. Peripheral Vascular Disease:  Negative. MEDICATIONS AT HOME:  She is on Norvasc 10 mg daily, aspirin 81 mg daily, Zyrtec 10 mg daily, Tranxene 7.5 mg daily, CoQ10 daily, fish oil daily, Plaquenil 200 mg daily, lisinopril 5 mg daily, magnesium 400 mg daily, Ditropan XL 5 mg daily, Crestor 20 mg daily, vitamin B12 1000 mcg daily, and vitamin D 1000 units daily. PAST MEDICAL AND SURGICAL HISTORY:  1. She has discoid lupus with arthritis, follows Dr. Dain Lopez. 2.  She had melanoma in her chest.  3.  Hypertension. 4.  Lipoma in .  5. She had back surgery in , on L5 at [facility]. 6.  She has had bladder surgery and several colonoscopies with one adenomatous polyp removed. 7.  She had hammertoe surgery on her right on 2014, with second toe and bunion surgery of the right great toe. 8.  She had right third hammertoe surgery repair on 2020.  9.  Bilateral hand tendon surgery. 10.  Bilateral knee arthroscopic surgery. 11.  Left total knee replacement on 10/28/2020. FAMILY HISTORY:  Her mother, Khalif Torrez _____, had coronary artery disease. Her aunts and uncles on her mother's side have all  of heart disease. She has one sister with no heart disease. Father, enlarged heart. SOCIAL HISTORY:  She is 74years old, , two daughters, Kerline Ness and Modesto, both live in [place]. She does not smoke. Drinks alcohol rarely. She works out three days a week. REVIEW OF SYSTEMS:  Cardiac as above. Other systems reviewed including constitutional, eyes, ears, nose and throat, cardiovascular, respiratory, GI, , musculoskeletal, integumentary, neurologic, psychiatric, endocrine, hematologic and allergic/immunologic are negative except for described above. No weight loss or weight gain. No change in bowel habits. No blood in stools.   No fevers, sweats or chills. PHYSICAL EXAMINATION:  VITAL SIGNS:  Her blood pressure was 144/80 with a heart rate of 55 and regular. Respiratory rate 18. O2 saturation 97%. Weight 153 pounds. GENERAL:  She is a pleasant 56-year-old female. Denied pain. She was oriented to person, place and time. Answered questions appropriately. SKIN:  No unusual skin changes. HEENT:  The pupils are equally round and intact. Mucous membranes were dry. NECK:  No JVD. Good carotid pulses. No carotid bruits. No lymphadenopathy or thyromegaly. CARDIOVASCULAR EXAM:  S1 and S2 were normal.  No S3 or S4. Soft systolic blowing type murmur. No diastolic murmur. PMI was normal.  No lift, thrust, or pericardial friction rub. LUNGS:  Clear to auscultation and percussion. ABDOMEN:  Soft and nontender. Good bowel sounds. EXTREMITIES:  Good femoral pulses. Good pedal pulses. No pedal edema. Skin was warm and dry. No calf tenderness. Nail beds pink. Good cap refill. PULSES:  Bilateral symmetrical radial, brachial and carotid pulses. No carotid bruits. Good femoral and pedal pulses. NEUROLOGIC EXAM:  Within normal limits. PSYCHIATRIC EXAM:  Within normal limits. LABORATORY DATA:  Her sodium was 139, potassium 4.1, BUN 15, creatinine 0.87, GFR greater than 60. Magnesium was 2.0. Calcium 9.3. Cholesterol 147, HDL 66, LDL 69, triglycerides 60. Her TSH was 2.06. Vitamin D 72.1. White count was 5.3, hemoglobin 13.6 with a platelet count 540,740. Her EKG showed sinus rhythm and old anterior myocardial infarction with nonspecific ST changes which has changed from previous EKGs. Chest x-ray was unremarkable. IMPRESSION:  1. Abnormal EKG, new changes with nonspecific ST changes and an old anterior myocardial infarction. 2.  Normal Lexiscan cardiac stress test on 09/13/2013, when she saw Dr. Mitzi Puentes for chest pain.   3.  Abnormal Lexiscan cardiac stress test on 06/06/2016, with inferior wall possible ischemia, although catheterization not done because of no significant chest pain. 4.  Significant family history of coronary artery disease with mother having myocardial infarction at a young age and multiple aunts and uncles on her mother's side having myocardial infarctions at a young age. 5.  Cleared for left foot bunionectomy and hammertoe surgery by Dr. Erik Saeed on 05/18. 6.  Hypertension. 7.  Hyperlipidemia, well controlled. 8.  Discoid lupus. PLAN:  1. We will do Lexiscan cardiac stress test tomorrow. 2.  If there is a significant change from previous stress test, I would hold surgery and do a cardiac catheterization first.  If there is no significant change, then she can continue to be cleared for surgery on 05/18. DISCUSSION:  Mrs. Awais Krishnan has significant risk factors for coronary artery disease including a family history, hypertension, hyperlipidemia and autoimmune disorder with discoid lupus. She has some atypical discomfort not necessarily related to any activity. However, her EKG has changed with now a possible old anterior myocardial infarction. I suspect this is lead placement as her bedside echocardiogram showed normal LV function. However, with her multiple risk factors and with change in EKG, I think it is reasonable to do a Lexiscan stress test.    We will try to do that tomorrow, so that I will be able to look at it tomorrow afternoon. If it is unchanged, which I suspect it will be, she would be cleared for her bunion surgery on Wednesday. If there is a significant change, then I would hold the surgery until a cardiac workup is complete. Thank you very much for allowing me the privilege of seeing Mrs. Awais Krishnan.  If you have any questions on my thoughts, please do not hesitate to contact me.     Sincerely,        Ian Nicole    D: 05/16/2022 11:39:01     T: 05/16/2022 11:43:27     [id number]  Job#: [id number]   Doc#: [id number]    CC:  Liat Alan

## 2022-05-16 NOTE — LETTER
[facility] C[facility]IOLOG[facility] SPECIAL[facility]  [place] [id number]  Dept: [phone]  Dept Fax: [phone]          5/17/22      To Whom it May Concern: In my opinion, from a cardiology standpoint, Micha Never is cleared for surgery. If you have any questions, please feel free to call me at [phone].     Sincerely,          Joaquin Carlson MD

## 2022-05-17 ENCOUNTER — HOSPITAL ENCOUNTER (OUTPATIENT)
Dept: NUCLEAR MEDICINE | Age: 67
Discharge: HOME | End: 2022-05-19
Payer: MEDICARE

## 2022-05-17 ENCOUNTER — HOSPITAL ENCOUNTER (OUTPATIENT)
Dept: NON INVASIVE DIAGNOSTICS | Age: 67
Discharge: HOME | End: 2022-05-17
Payer: MEDICARE

## 2022-05-17 ENCOUNTER — TELEPHONE (OUTPATIENT)
Dept: PRIMARY CARE CLINIC | Age: 67
End: 2022-05-17

## 2022-05-17 VITALS — DIASTOLIC BLOOD PRESSURE: 77 MMHG | SYSTOLIC BLOOD PRESSURE: 135 MMHG | HEART RATE: 64 BPM

## 2022-05-17 DIAGNOSIS — R94.31 ABNORMAL EKG: ICD-10-CM

## 2022-05-17 PROCEDURE — 6360000002 HC RX W HCPCS: Performed by: INTERNAL MEDICINE

## 2022-05-17 PROCEDURE — 93017 CV STRESS TEST TRACING ONLY: CPT

## 2022-05-17 PROCEDURE — 78452 HT MUSCLE IMAGE SPECT MULT: CPT

## 2022-05-17 PROCEDURE — 3430000000 HC RX DIAGNOSTIC RADIOPHARMACEUTICAL: Performed by: INTERNAL MEDICINE

## 2022-05-17 PROCEDURE — 2580000003 HC RX 258: Performed by: INTERNAL MEDICINE

## 2022-05-17 PROCEDURE — A9500 TC99M SESTAMIBI: HCPCS | Performed by: INTERNAL MEDICINE

## 2022-05-17 RX ORDER — AMINOPHYLLINE DIHYDRATE 25 MG/ML
50 INJECTION, SOLUTION INTRAVENOUS PRN
Status: DISPENSED | OUTPATIENT
Start: 2022-05-17 | End: 2022-05-17

## 2022-05-17 RX ORDER — SODIUM CHLORIDE 0.9 % (FLUSH) 0.9 %
5-40 SYRINGE (ML) INJECTION PRN
Status: ACTIVE | OUTPATIENT
Start: 2022-05-17 | End: 2022-05-17

## 2022-05-17 RX ADMIN — SODIUM CHLORIDE, PRESERVATIVE FREE 10 ML: 5 INJECTION INTRAVENOUS at 08:16

## 2022-05-17 RX ADMIN — TETRAKIS(2-METHOXYISOBUTYLISOCYANIDE)COPPER(I) TETRAFLUOROBORATE 30 MILLICURIE: 1 INJECTION, POWDER, LYOPHILIZED, FOR SOLUTION INTRAVENOUS at 08:19

## 2022-05-17 RX ADMIN — REGADENOSON 0.4 MG: 0.08 INJECTION, SOLUTION INTRAVENOUS at 08:16

## 2022-05-17 RX ADMIN — TETRAKIS(2-METHOXYISOBUTYLISOCYANIDE)COPPER(I) TETRAFLUOROBORATE 10 MILLICURIE: 1 INJECTION, POWDER, LYOPHILIZED, FOR SOLUTION INTRAVENOUS at 06:55

## 2022-05-17 NOTE — PROGRESS NOTES
Latesha Ogden MD  [facility]  [address] [place], [place]  [phone]    May 16, 2022    Hilario Henson DPM  [facility] [address] [place] [address]    RE:   Micha Gonzalez  :  1955      Dear Dr. Phoenix Luong:    CHIEF COMPLAINT:  1. Abnormal EKG. 2.  Tremendous family history of coronary artery disease. 3.  Hyperlipidemia, under good control. 4.  Hypertension, under good control. HISTORY OF PRESENT ILLNESS:  I had the pleasure of seeing Mrs. Sommer Florez in our office on 2022. She is a very pleasant 22-year-old female who has a tremendous family history of coronary artery disease. Her mother and all her mother's sisters had coronary artery disease. Her mother was Delfino Del Rosario _____, who we had been following. She did see Dr. Enoc Velasco in , because of chest pain. He did a Lexiscan stress test which was normal.    In , she had another Lexiscan cardiac stress test and at this time, it showed possible inferior wall ischemia. However, because her pain was atypical, no cardiac catheterization was done. She is pending having a left foot bunionectomy and hammertoe surgery by Dr. Phoenix Luong for 2022. An EKG was done that was abnormal showing nonspecific ST changes and an old anterior myocardial infarction. I was asked to see her in consult. She denies any myocardial infarction. She has occasional atypical chest pain not necessarily related to activity. She does workout, exercising three times a week at a gym. She does both weightlifting and cardio. She denies any syncope, near syncope, or lightheadedness, and she has had no palpitations. She has been treated for hyperlipidemia with Crestor and it is under good control. She has also been treated for hypertension which is also under excellent control. She stopped smoking in . She does have discoid lupus.     CARDIAC RISK FACTORS:  Other Family Members: sweats or chills. PHYSICAL EXAMINATION:  VITAL SIGNS:  Her blood pressure was 144/80 with a heart rate of 55 and regular. Respiratory rate 18. O2 saturation 97%. Weight 153 pounds. GENERAL:  She is a pleasant 47-year-old female. Denied pain. She was oriented to person, place and time. Answered questions appropriately. SKIN:  No unusual skin changes. HEENT:  The pupils are equally round and intact. Mucous membranes were dry. NECK:  No JVD. Good carotid pulses. No carotid bruits. No lymphadenopathy or thyromegaly. CARDIOVASCULAR EXAM:  S1 and S2 were normal.  No S3 or S4. Soft systolic blowing type murmur. No diastolic murmur. PMI was normal.  No lift, thrust, or pericardial friction rub. LUNGS:  Clear to auscultation and percussion. ABDOMEN:  Soft and nontender. Good bowel sounds. EXTREMITIES:  Good femoral pulses. Good pedal pulses. No pedal edema. Skin was warm and dry. No calf tenderness. Nail beds pink. Good cap refill. PULSES:  Bilateral symmetrical radial, brachial and carotid pulses. No carotid bruits. Good femoral and pedal pulses. NEUROLOGIC EXAM:  Within normal limits. PSYCHIATRIC EXAM:  Within normal limits. LABORATORY DATA:  Her sodium was 139, potassium 4.1, BUN 15, creatinine 0.87, GFR greater than 60. Magnesium was 2.0. Calcium 9.3. Cholesterol 147, HDL 66, LDL 69, triglycerides 60. Her TSH was 2.06. Vitamin D 72.1. White count was 5.3, hemoglobin 13.6 with a platelet count 144,607. Her EKG showed sinus rhythm and old anterior myocardial infarction with nonspecific ST changes which has changed from previous EKGs. Chest x-ray was unremarkable. IMPRESSION:  1. Abnormal EKG, new changes with nonspecific ST changes and an old anterior myocardial infarction. 2.  Normal Lexiscan cardiac stress test on 09/13/2013, when she saw Dr. Denice Pink for chest pain.   3.  Abnormal Lexiscan cardiac stress test on 06/06/2016, with inferior wall possible ischemia, although catheterization not done because of no significant chest pain. 4.  Significant family history of coronary artery disease with mother having myocardial infarction at a young age and multiple aunts and uncles on her mother's side having myocardial infarctions at a young age. 5.  Cleared for left foot bunionectomy and hammertoe surgery by Dr. Phoenix Luong on 05/18. 6.  Hypertension. 7.  Hyperlipidemia, well controlled. 8.  Discoid lupus. PLAN:  1. We will do Lexiscan cardiac stress test tomorrow. 2.  If there is a significant change from previous stress test, I would hold surgery and do a cardiac catheterization first.  If there is no significant change, then she can continue to be cleared for surgery on 05/18. DISCUSSION:  Mrs. Alma Rosa Cheung has significant risk factors for coronary artery disease including a family history, hypertension, hyperlipidemia and autoimmune disorder with discoid lupus. She has some atypical discomfort not necessarily related to any activity. However, her EKG has changed with now a possible old anterior myocardial infarction. I suspect this is lead placement as her bedside echocardiogram showed normal LV function. However, with her multiple risk factors and with change in EKG, I think it is reasonable to do a Lexiscan stress test.    We will try to do that tomorrow, so that I will be able to look at it tomorrow afternoon. If it is unchanged, which I suspect it will be, she would be cleared for her bunion surgery on Wednesday. If there is a significant change, then I would hold the surgery until a cardiac workup is complete. Thank you very much for allowing me the privilege of seeing Mrs. Alma Rosa Cheung.  If you have any questions on my thoughts, please do not hesitate to contact me.     Sincerely,        Alondra Hernandez    D: 05/16/2022 11:39:01     T: 05/16/2022 11:43:27     [id number]  Job#: [id number]   Doc#: [id number]    CC:  Sky Ramos

## 2022-05-17 NOTE — PROGRESS NOTES
Lexiscan administered, pt experience shortness of breath. 08:19 Pt denies chest pain. 08:22 Pt reports she feels back to normal. This part of test completed. Pt given beverage and snack.

## 2022-05-17 NOTE — PROCEDURES
[place] [address]                              CARDIAC STRESS TEST    PATIENT NAME: Jaylyn Thorne                      :        1955  MED REC NO:   [id number]                              ROOM:  ACCOUNT NO:   [de-identified]                           ADMIT DATE: 2022  PROVIDER:     Ky Leong MD    DATE OF STUDY:  2022    Cardiovascular Diagnostics Department    Ordering Provider:  Taryn Vasques MD    Primary Care Provider:  JESSENIA Kasper-CNP    Interpreting Physician:  Ky Leong MD    MYOCARDIAL PERFUSION STRESS IMAGING    The stress ECG results are reported separately. NUCLEAR IMAGING RESULTS:  The overall quality of the study is fair. No  significant attenuation artifact was seen. There is no evidence of  abnormal lung uptake. Additionally, the right ventricle appears normal.  The left ventricular cavity is noted to be normal in size on stress  images. There is no evidence of transient ischemic dilatation (TID) of  the left ventricle. Gated SPECT imaging reveals normal myocardial thickening and wall motion  with a calculated left ventricular ejection fraction (EF) of 65%. The rest images demonstrate homogenous tracer distribution throughout  the myocardium. SPECT images demonstrate homogenous tracer distribution throughout the  myocardium. IMPRESSION:  1. Normal myocardial perfusion imaging without evidence of significant  myocardial ischemia or infarction. 2.  Global left ventricular systolic function was normal with an EF of  65% without regional wall motion abnormalities. Overall these results are most consistent with a low risk for  significant coronary artery disease. The results of this test were discussed with Dr. Colt Simpson on 2022  at [phone].         Chaya Renee MD    D: 2022 15:25:36       T: 05/17/2022 15:27:52     [email or web address]  Job#: [id number]     Doc#: Unknown    CC:  Cristela Boyd

## 2022-05-17 NOTE — TELEPHONE ENCOUNTER
Dr Tiana Scott office called in stating pt was cleared by cardiology this morning.  The office is needing a simply stated surgery clearance letter faxed over today [phone]

## 2022-05-17 NOTE — PROGRESS NOTES
Dr reviewed stress test  Looks good   Pt is cleared for surgery    [facility] office called   As well as pt.

## 2022-05-17 NOTE — PROCEDURES
[place] [address]                              CARDIAC STRESS TEST    PATIENT NAME: Luke Amador                      :        1955  MED REC NO:   [id number]                              ROOM:  ACCOUNT NO:   [de-identified]                           ADMIT DATE: 2022  PROVIDER:     Miguel Gale    DATE OF STUDY:  2022    NAME OF TEST:  Lexiscan Cardiolite stress test.    INDICATION:  Chest pain, abnormal EKG. IMPRESSION:  1. We gave 0.4 mg of Lexiscan intravenously. 2.  This was followed in 20 seconds by Cardiolite infusion. 3.  There was no chest pain. 4.  There was no ST depression. 5.  It was an overall negative Lexiscan stress test.  6.  Cardiolite to follow.         Elizabeth Riff    D: 2022 13:53:33       T: 2022 14:15:04     [id number]  Job#: [id number]     Doc#: [id number]    CC:  Jazmyne Meier

## 2022-05-25 ENCOUNTER — SCHEDULED TELEPHONE ENCOUNTER (OUTPATIENT)
Dept: PRIMARY CARE CLINIC | Age: 67
End: 2022-05-25
Payer: MEDICARE

## 2022-05-25 DIAGNOSIS — E55.9 VITAMIN D DEFICIENCY: ICD-10-CM

## 2022-05-25 DIAGNOSIS — M85.88 OSTEOPENIA OF LUMBAR SPINE: Primary | ICD-10-CM

## 2022-05-25 DIAGNOSIS — Z78.0 POSTMENOPAUSAL ESTROGEN DEFICIENCY: ICD-10-CM

## 2022-05-25 PROCEDURE — G2025 DIS SITE TELE SVCS RHC/FQHC: HCPCS | Performed by: NURSE PRACTITIONER

## 2022-05-25 RX ORDER — HYDROCODONE BITARTRATE AND ACETAMINOPHEN 5; 325 MG/1; MG/1
TABLET ORAL
COMMUNITY
Start: 2022-05-18 | End: 2022-10-11 | Stop reason: ALTCHOICE

## 2022-05-25 NOTE — PROGRESS NOTES
Natalie Delarosa is a 74 y.o. female evaluated via telephone on 5/25/2022 for Discuss Medications (Discuss starting fosamax)  . Documentation:  I communicated with the patient and/or health care decision maker about calcium supplement 1200 mg total daily, 600mg from diet , other from supplement  Vitamin D3 take 2000 iu daily  Discussed DEXA   Osteopenia  Wt bearing exercises. biphosphonates risk/benefit. Duration of therapy  Risk fx with long term use, risk osteonecrosis of jaw. .   Details of this discussion including any medical advice provided: pt agreeable to assure between diet and supplement 1200mg calcium daily  Vitamin D3 take 2000 iu daily  Will start fosamax 35 mg ONCE a week, take with 1-2 glasses of water and stay sitting up 60 minutes after use. Watch for s/s stomach irritation. Next dexa in 2 years. 5/4/2022  DEXA     FINDINGS: L1 through L4 bone mineral density normal with a T-score of 0.8. 1.281 g/sq cm. 3.9% increase from 2016.       Bone mineral density of the hips is now osteopenic with a T-score -1.5. 0.815    g/sq cm.       The hips show a 13% decrease from 2016.               Impression       New osteopenia in the hips.       The 10 year probability of major osteoporotic fracture is 11.5%. Hip fracture    risk 2.2%.           1. Osteopenia of lumbar spine  Start fosamax 1 x week at preventative osteoporosis dose 35 mg weekly   Calcium 1200mg daily  vitamind D3 2000 iu daily    2. Vitamin D deficiency  Continue supplement    3. Postmenopausal estrogen deficiency        Total Time: minutes: 21-30 minutes    Natalie Delarosa was evaluated through a synchronous (real-time) audio encounter. Patient identification was verified at the start of the visit. She (or guardian if applicable) is aware that this is a billable service, which includes applicable co-pays. This visit was conducted with the patient's (and/or legal guardian's) verbal consent.  She has not had a related appointment within my department in the past 7 days or scheduled within the next 24 hours. The patient was located at Home: [address] [place] [address]. The provider was located at Home ([place] [address]): [place].     Note: not billable if this call serves to triage the patient into an appointment for the relevant concern    Fabiola Bain, JESSENIA - CNP

## 2022-05-26 RX ORDER — ALENDRONATE SODIUM 35 MG/1
35 TABLET ORAL
Qty: 12 TABLET | Refills: 0 | Status: SHIPPED | OUTPATIENT
Start: 2022-05-26 | End: 2022-07-26

## 2022-07-26 RX ORDER — ALENDRONATE SODIUM 35 MG/1
TABLET ORAL
Qty: 12 TABLET | Refills: 3 | Status: SHIPPED | OUTPATIENT
Start: 2022-07-26 | End: 2022-10-11

## 2022-08-29 RX ORDER — ROSUVASTATIN CALCIUM 20 MG/1
20 TABLET, COATED ORAL DAILY
Qty: 90 TABLET | Refills: 3 | Status: SHIPPED | OUTPATIENT
Start: 2022-08-29

## 2022-09-07 RX ORDER — OXYBUTYNIN CHLORIDE 5 MG/1
5 TABLET, EXTENDED RELEASE ORAL DAILY
Qty: 90 TABLET | Refills: 3 | Status: SHIPPED | OUTPATIENT
Start: 2022-09-07

## 2022-10-11 ENCOUNTER — OFFICE VISIT (OUTPATIENT)
Dept: PRIMARY CARE CLINIC | Age: 67
End: 2022-10-11
Payer: MEDICARE

## 2022-10-11 VITALS
SYSTOLIC BLOOD PRESSURE: 130 MMHG | DIASTOLIC BLOOD PRESSURE: 88 MMHG | HEART RATE: 85 BPM | BODY MASS INDEX: 24.59 KG/M2 | OXYGEN SATURATION: 95 % | WEIGHT: 157 LBS

## 2022-10-11 DIAGNOSIS — E53.8 VITAMIN B 12 DEFICIENCY: ICD-10-CM

## 2022-10-11 DIAGNOSIS — M85.88 OSTEOPENIA OF LUMBAR SPINE: ICD-10-CM

## 2022-10-11 DIAGNOSIS — F41.9 ANXIETY: ICD-10-CM

## 2022-10-11 DIAGNOSIS — I10 ESSENTIAL HYPERTENSION: Primary | ICD-10-CM

## 2022-10-11 DIAGNOSIS — E55.9 VITAMIN D DEFICIENCY: ICD-10-CM

## 2022-10-11 DIAGNOSIS — Z87.2 HX OF DISCOID LUPUS ERYTHEMATOSUS: ICD-10-CM

## 2022-10-11 PROCEDURE — 1123F ACP DISCUSS/DSCN MKR DOCD: CPT | Performed by: NURSE PRACTITIONER

## 2022-10-11 PROCEDURE — G8399 PT W/DXA RESULTS DOCUMENT: HCPCS | Performed by: NURSE PRACTITIONER

## 2022-10-11 PROCEDURE — G8427 DOCREV CUR MEDS BY ELIG CLIN: HCPCS | Performed by: NURSE PRACTITIONER

## 2022-10-11 PROCEDURE — 99214 OFFICE O/P EST MOD 30 MIN: CPT | Performed by: NURSE PRACTITIONER

## 2022-10-11 PROCEDURE — 1090F PRES/ABSN URINE INCON ASSESS: CPT | Performed by: NURSE PRACTITIONER

## 2022-10-11 PROCEDURE — G8420 CALC BMI NORM PARAMETERS: HCPCS | Performed by: NURSE PRACTITIONER

## 2022-10-11 PROCEDURE — 1036F TOBACCO NON-USER: CPT | Performed by: NURSE PRACTITIONER

## 2022-10-11 PROCEDURE — G8484 FLU IMMUNIZE NO ADMIN: HCPCS | Performed by: NURSE PRACTITIONER

## 2022-10-11 PROCEDURE — 3017F COLORECTAL CA SCREEN DOC REV: CPT | Performed by: NURSE PRACTITIONER

## 2022-10-11 RX ORDER — LISINOPRIL 5 MG/1
5 TABLET ORAL DAILY
Qty: 90 TABLET | Refills: 3 | Status: SHIPPED | OUTPATIENT
Start: 2022-10-11

## 2022-10-11 SDOH — ECONOMIC STABILITY: FOOD INSECURITY: WITHIN THE PAST 12 MONTHS, THE FOOD YOU BOUGHT JUST DIDN'T LAST AND YOU DIDN'T HAVE MONEY TO GET MORE.: NEVER TRUE

## 2022-10-11 SDOH — ECONOMIC STABILITY: FOOD INSECURITY: WITHIN THE PAST 12 MONTHS, YOU WORRIED THAT YOUR FOOD WOULD RUN OUT BEFORE YOU GOT MONEY TO BUY MORE.: NEVER TRUE

## 2022-10-11 ASSESSMENT — SOCIAL DETERMINANTS OF HEALTH (SDOH): HOW HARD IS IT FOR YOU TO PAY FOR THE VERY BASICS LIKE FOOD, HOUSING, MEDICAL CARE, AND HEATING?: NOT HARD AT ALL

## 2022-10-11 NOTE — PROGRESS NOTES
[facility] PRIMARY CARE [address] [address]  Dept: [phone]  Dept Fax: [phone]    Last encounter 5/25/2022    Mood Swings (Patient requesting to be put back on xanax)       HPI:   Major Coup is a 69 y.o. female who presentstoday for her medical conditions/complaints as noted below. Major Coup is c/o of Mood Swings (Patient requesting to be put back on xanax)      HPI  Established patient    Calcium 1200 mg daily recommended. Vitamin D3 takes 1000 iu day. Magnesium   Not taking fosamax- due to risk of alopecia , femur fx, osteonecrosis jaw and dyspepsia. Currently osteopenia discussed calcium and vitamin D supplementation, weight bearing exercises. Pt active at gym with weight lifting doing well. Hx knee replacement. Send labs to dermatology when done. Pt with hx discoid lupus once treated with rheumatology now following with dermatology , also hx melanoma on chest in past. On plaquenil       Dr. Putnam Child psychiatry- phone visits due Nov 23, 2022 psychiatry no longer doing telephone visits. On Traxene chronically wants me to take over prescribing will be due in November. Pt has been with psychiatry since covid onset and was transitioned from xanax at that time. Pt with stable control and has done well  Agreeable to benzo contract sign today   OARrS reviewed consistent with use. Stable on meds. No mental health hospitalizations. Contract will be signed today. Ditropan XL 5 mg taken for stress incontinence. Htn on amlodipine with good control denies side effects   No headaches, no chest pain.      Hyperlipidemia discussed recommendation crestor 20 mg daily  The 10-year ASCVD risk score (Zeferino GUTIERREZ, et al., 2019) is: 10.1%    Values used to calculate the score:      Age: 69 years      Sex: Female      Is Non- : No      Diabetic: No      Tobacco smoker: No      Systolic Blood Pressure: 130 mmHg      Is BP treated: Yes      HDL Cholesterol: 66 mg/dL      Total Cholesterol: 147 mg/dL            Did not choose   Reviewed prior notes None  Reviewed previous Labs, Imaging, and Hospital Records    Past Medical History:   Diagnosis Date    Arthritis     follow with Dr. Dennie Bering [facility])     melanoma on chest    Discoid lupus     Hay fever     Hormone disorder     Hypertension     Lipoma 2009    Rosacea       Past Surgical History:   Procedure Laterality Date    BACK SURGERY      L-5, bethany @ [facility] [address] [facility]    BLADDER SURGERY      COLONOSCOPY      COLONOSCOPY  2016    Dr. Littlejohn Foots:  1 adenomatous polyp    COLONOSCOPY N/A 2019    Dr. Littlejohn Foots:  Hyperplastic polyps    FOOT SURGERY      HAMMER TOE SURGERY Right 14    2nd toe, bunion surgery great right toe    HAMMER TOE SURGERY Right 2020    RIGHT 3rd TOE HAMMER REPAIR W/  Right Great Toe EHL LEGNTHENING performed by Olga Dennis DPM at [address] [facility] Bilateral     HYSTERECTOMY (CERVIX STATUS UNKNOWN)      KNEE ARTHROSCOPY Bilateral     LIPOMA RESECTION      SKIN BIOPSY      rt chest    TOTAL KNEE ARTHROPLASTY Left 10/28/2020    LEFT KNEE TOTAL ARTHROPLASTY - IFEANYI 360 performed by Berenice Iqbal DO at [facility] OR       Family History   Problem Relation Age of Onset    Heart Disease Mother     High Cholesterol Mother     High Blood Pressure Mother     Heart Surgery Mother     Other Mother         Heart Cath     Heart Disease Father     Heart Failure Father     Alcohol Abuse Maternal Grandfather     Diabetes Paternal Grandfather     Other Maternal Aunt         All maternal aunts had heart disease       Social History     Tobacco Use    Smoking status: Former     Packs/day: 0.50     Years: 23.00     Pack years: 11.50     Types: Cigarettes     Start date: 1994     Quit date: 2017     Years since quittin.0    Smokeless tobacco: Never   Substance Use Topics    Alcohol use:  Yes Alcohol/week: 5.0 standard drinks     Types: 5 Shots of liquor per week     Comment: occasional       Current Outpatient Medications   Medication Sig Dispense Refill    lisinopril (PRINIVIL;ZESTRIL) 5 MG tablet Take 1 tablet by mouth daily 90 tablet 3    oxybutynin (DITROPAN-XL) 5 MG extended release tablet TAKE 1 TABLET BY MOUTH  DAILY 90 tablet 3    rosuvastatin (CRESTOR) 20 MG tablet TAKE 1 TABLET BY MOUTH  DAILY 90 tablet 3    aspirin 81 MG EC tablet Take 81 mg by mouth daily      amLODIPine (NORVASC) 10 MG tablet TAKE 1 TABLET BY MOUTH  DAILY 90 tablet 3    clorazepate (TRANXENE) 7.5 MG tablet TAKE 1 TABLET BY MOUTH EVERY DAY      hydroxychloroquine (PLAQUENIL) 200 MG tablet take 1 tablet by mouth once daily      cetirizine (ZYRTEC) 10 MG tablet Take 10 mg by mouth daily      Glucosamine 500 MG CAPS Take 2,000 tablets by mouth daily      Coenzyme Q10 (CO Q-10) 200 MG CAPS Take 1 capsule by mouth      NONFORMULARY Indications: Womens 1 a day 50 plus. NONFORMULARY Indications: Natures way hair skin and nails  2500 mcg 1 a day. vitamin B-12 (CYANOCOBALAMIN) 500 MCG tablet Take 500 mcg by mouth daily      Magnesium 400 MG CAPS Take  by mouth daily. fish oil-omega-3 fatty acids 1000 MG capsule Take 1 g by mouth 2 times daily. vitamin D (CHOLECALCIFEROL) 400 UNITS TABS tablet Take 1,000 Units by mouth daily       calcium-vitamin D (OSCAL) 250-125 MG-UNIT per tablet Take 1 tablet by mouth daily Vit D and Vit K 750 mg daily, takes 3 a day now      Ascorbic Acid (VITAMIN C) 500 MG tablet Take 500 mg by mouth daily. No current facility-administered medications for this visit.      Allergies   Allergen Reactions    Augmentin [Amoxicillin-Pot [facility] Maintenance   Topic Date Due    Flu vaccine (1) 08/01/2022    Depression Screen  04/06/2023    Annual Wellness Visit (AWV)  04/07/2023    Lipids  05/12/2023    Breast cancer screen  05/04/2024    Colorectal Cancer Screen  07/01/2024    DTaP/Tdap/Td vaccine (3 - Td or Tdap) 12/10/2028    DEXA (modify frequency per FRAX score)  Completed    Shingles vaccine  Completed    Pneumococcal 65+ years Vaccine  Completed    COVID-19 Vaccine  Completed    Hepatitis C screen  Completed    Hepatitis A vaccine  Aged Out    Hib vaccine  Aged Out    Meningococcal (ACWY) vaccine  Aged Out       Subjective:      Review of Systems   Constitutional:  Negative for activity change, appetite change, chills and fever. HENT:  Negative for ear pain and sore throat. Eyes: Negative. Respiratory:  Negative for cough, chest tightness, shortness of breath and wheezing. Cardiovascular:  Negative for chest pain, palpitations and leg swelling. Genitourinary:  Negative for difficulty urinating. Musculoskeletal:  Negative for arthralgias and joint swelling. Skin: Negative. Neurological:  Negative for dizziness and headaches. Psychiatric/Behavioral:  Negative for behavioral problems, decreased concentration, self-injury, sleep disturbance and suicidal ideas. The patient is nervous/anxious. Objective:     Physical Exam  Vitals and nursing note reviewed. Constitutional:       General: She is not in acute distress. Appearance: Normal appearance. She is well-developed. HENT:      Head: Normocephalic and atraumatic. Right Ear: Tympanic membrane and external ear normal.      Left Ear: Tympanic membrane and external ear normal.      Nose: No congestion. Mouth/Throat:      Mouth: Mucous membranes are moist.   Eyes:      General: No scleral icterus. Pupils: Pupils are equal, round, and reactive to light. Neck:      Vascular: No carotid bruit (neg luz elena). Cardiovascular:      Rate and Rhythm: Normal rate and regular rhythm. Heart sounds: Normal heart sounds. No murmur heard. Pulmonary:      Effort: Pulmonary effort is normal. No respiratory distress. Breath sounds: Normal breath sounds. No wheezing. Abdominal:      Palpations: Abdomen is soft. Tenderness: There is no abdominal tenderness. Musculoskeletal:         General: Normal range of motion. Cervical back: Normal range of motion and neck supple. Right lower leg: No edema. Left lower leg: No edema. Lymphadenopathy:      Cervical: No cervical adenopathy. Skin:     General: Skin is warm and dry. Neurological:      Mental Status: She is alert and oriented to person, place, and time. Psychiatric:         Behavior: Behavior normal.         Thought Content: Thought content normal.         Judgment: Judgment normal.      Comments: Good eye contact future oriented. /88   Pulse 85   Wt 157 lb (71.2 kg)   LMP 05/20/2012   SpO2 95%   BMI 24.59 kg/m²     Data:     Lab Results   Component Value Date/Time     05/05/2022 11:58 AM    K 4.1 05/05/2022 11:58 AM     05/05/2022 11:58 AM    CO2 26 05/05/2022 11:58 AM    BUN 15 05/05/2022 11:58 AM    CREATININE 0.87 05/05/2022 11:58 AM    GLUCOSE 97 05/05/2022 11:58 AM    PROT 7.5 01/12/2022 05:00 PM    LABALBU 4.5 01/12/2022 05:00 PM    LABALBU 4.3 10/19/2011 07:37 AM    BILITOT 0.34 01/12/2022 05:00 PM    ALKPHOS 77 01/12/2022 05:00 PM    AST 43 01/12/2022 05:00 PM    ALT 25 01/12/2022 05:00 PM     Lab Results   Component Value Date/Time    WBC 5.3 05/05/2022 11:58 AM    RBC 4.45 05/05/2022 11:58 AM    RBC 4.54 10/19/2011 07:37 AM    HGB 13.6 05/05/2022 11:58 AM    HCT 41.8 05/05/2022 11:58 AM    MCV 94.0 05/05/2022 11:58 AM    MCH 30.5 05/05/2022 11:58 AM    MCHC 32.5 05/05/2022 11:58 AM    RDW 14.8 05/05/2022 11:58 AM     05/05/2022 11:58 AM     10/19/2011 07:37 AM    MPV 11.5 01/12/2022 05:00 PM     Lab Results   Component Value Date/Time    TSH 2.06 05/12/2022 08:38 AM     Lab Results   Component Value Date/Time    CHOL 147 05/12/2022 08:38 AM    HDL 66 05/12/2022 08:38 AM          Assessment & Plan       1.  Essential hypertension  Stable   - lisinopril (PRINIVIL;ZESTRIL) 5 MG tablet; Take 1 tablet by mouth daily  Dispense: 90 tablet; Refill: 3    2. Anxiety  Agreeable to contract  Will continue tranxene     3. Osteopenia of lumbar spine  Calcium and vit D  Refuses biphosphonate afraid of side effects    4. Hx of discoid lupus erythematosus  On plaquenil     5. Vitamin D deficiency  Continue supplement    6. Vitamin B 12 deficiency  Continue supplement                 Completed Refills   Requested Prescriptions     Signed Prescriptions Disp Refills    lisinopril (PRINIVIL;ZESTRIL) 5 MG tablet 90 tablet 3     Sig: Take 1 tablet by mouth daily     Return in about 3 months (around 1/11/2023) for anxiety med  feb 2023, medicare May 2023 . Orders Placed This Encounter   Medications    lisinopril (PRINIVIL;ZESTRIL) 5 MG tablet     Sig: Take 1 tablet by mouth daily     Dispense:  90 tablet     Refill:  3     Requesting 1 year supply     No orders of the defined types were placed in this encounter. On this date 10/11/2022 I have spent 32 minutes reviewing previous notes, test results and face to face with the patient discussing the diagnosis and importance of compliance with the treatment plan as well as documenting on the day of the visit.      Electronically signed by JESSENIA Ramos CNP on 10/16/2022 at 7:31 PM

## 2022-10-16 ASSESSMENT — ENCOUNTER SYMPTOMS
COUGH: 0
WHEEZING: 0
CHEST TIGHTNESS: 0
SHORTNESS OF BREATH: 0
EYES NEGATIVE: 1
SORE THROAT: 0

## 2022-11-15 RX ORDER — CLORAZEPATE DIPOTASSIUM 7.5 MG/1
TABLET ORAL
Status: CANCELLED | OUTPATIENT
Start: 2022-11-15

## 2022-11-15 NOTE — TELEPHONE ENCOUNTER
Patient called in requesting refill of Clorazepate 7.5 MG. [facility] pharmacy in [address] [place] through [address], not insurance. Good Rx # [id number] if needed.      Last Rx: 3/1/2022  Last OV: 10/11/2022  Next OV: 1/11/2023

## 2022-11-16 DIAGNOSIS — F41.9 ANXIETY: Primary | ICD-10-CM

## 2022-11-17 RX ORDER — CLORAZEPATE DIPOTASSIUM 7.5 MG/1
7.5 TABLET ORAL NIGHTLY
Qty: 90 TABLET | Refills: 0 | Status: SHIPPED | OUTPATIENT
Start: 2022-11-16 | End: 2023-02-14

## 2023-01-11 ENCOUNTER — OFFICE VISIT (OUTPATIENT)
Dept: PRIMARY CARE CLINIC | Age: 68
End: 2023-01-11
Payer: MEDICARE

## 2023-01-11 VITALS
SYSTOLIC BLOOD PRESSURE: 138 MMHG | OXYGEN SATURATION: 95 % | HEIGHT: 67 IN | BODY MASS INDEX: 24.8 KG/M2 | HEART RATE: 72 BPM | DIASTOLIC BLOOD PRESSURE: 86 MMHG | WEIGHT: 158 LBS

## 2023-01-11 DIAGNOSIS — I10 ESSENTIAL HYPERTENSION: ICD-10-CM

## 2023-01-11 DIAGNOSIS — Z87.2 HX OF DISCOID LUPUS ERYTHEMATOSUS: ICD-10-CM

## 2023-01-11 DIAGNOSIS — F41.9 ANXIETY: Primary | ICD-10-CM

## 2023-01-11 DIAGNOSIS — M85.88 OSTEOPENIA OF LUMBAR SPINE: ICD-10-CM

## 2023-01-11 PROCEDURE — 1090F PRES/ABSN URINE INCON ASSESS: CPT | Performed by: NURSE PRACTITIONER

## 2023-01-11 PROCEDURE — 1123F ACP DISCUSS/DSCN MKR DOCD: CPT | Performed by: NURSE PRACTITIONER

## 2023-01-11 PROCEDURE — 3075F SYST BP GE 130 - 139MM HG: CPT | Performed by: NURSE PRACTITIONER

## 2023-01-11 PROCEDURE — 1036F TOBACCO NON-USER: CPT | Performed by: NURSE PRACTITIONER

## 2023-01-11 PROCEDURE — G8420 CALC BMI NORM PARAMETERS: HCPCS | Performed by: NURSE PRACTITIONER

## 2023-01-11 PROCEDURE — G8484 FLU IMMUNIZE NO ADMIN: HCPCS | Performed by: NURSE PRACTITIONER

## 2023-01-11 PROCEDURE — G8427 DOCREV CUR MEDS BY ELIG CLIN: HCPCS | Performed by: NURSE PRACTITIONER

## 2023-01-11 PROCEDURE — G8399 PT W/DXA RESULTS DOCUMENT: HCPCS | Performed by: NURSE PRACTITIONER

## 2023-01-11 PROCEDURE — 3079F DIAST BP 80-89 MM HG: CPT | Performed by: NURSE PRACTITIONER

## 2023-01-11 PROCEDURE — 3017F COLORECTAL CA SCREEN DOC REV: CPT | Performed by: NURSE PRACTITIONER

## 2023-01-11 PROCEDURE — 99212 OFFICE O/P EST SF 10 MIN: CPT | Performed by: NURSE PRACTITIONER

## 2023-01-11 RX ORDER — CLORAZEPATE DIPOTASSIUM 7.5 MG/1
7.5 TABLET ORAL NIGHTLY
Qty: 90 TABLET | Refills: 0 | Status: SHIPPED | OUTPATIENT
Start: 2023-01-11 | End: 2023-04-11

## 2023-01-11 ASSESSMENT — PATIENT HEALTH QUESTIONNAIRE - PHQ9
SUM OF ALL RESPONSES TO PHQ QUESTIONS 1-9: 0
1. LITTLE INTEREST OR PLEASURE IN DOING THINGS: 0
2. FEELING DOWN, DEPRESSED OR HOPELESS: 0
SUM OF ALL RESPONSES TO PHQ9 QUESTIONS 1 & 2: 0
SUM OF ALL RESPONSES TO PHQ QUESTIONS 1-9: 0

## 2023-01-11 ASSESSMENT — ENCOUNTER SYMPTOMS
BACK PAIN: 0
EYES NEGATIVE: 1
CONSTIPATION: 0
COUGH: 0
WHEEZING: 0
DIARRHEA: 0
SORE THROAT: 0

## 2023-01-11 NOTE — PATIENT INSTRUCTIONS
Phone: [phone]  Fax: [phone] Office Hours:  Monday: Lillie Matosshawn office location [phone] ([phone]) Offering additional late hours the first Monday of the month until 7 pm.   Tuesday: 8-5 Wednesday: 8-5 Thursday:  Additional hours offered 2 Thursdays a month. Please call to inquire those dates. Fridays: 7:30-4:30   SURVEY:    You may be receiving a survey from [employer] regarding your visit today. Please complete the survey to enable us to provide the highest quality of care to you and your family. If you cannot score us a very good on any question, please call the office to discuss how we could have made your experience a very good one. Thank you.

## 2023-01-11 NOTE — PROGRESS NOTES
[facility] PRIMARY CARE [address] [address]  Dept: [phone]  Dept Fax: [phone]    Last encounter 10/11/2022    Anxiety (3 month med check. Pt states medication is working well for her)       HPI:   Flaca Sethi is a 69 y.o. female who presentstoday for her medical conditions/complaints as noted below. Flaca Sethi is c/o of Anxiety (3 month med check. Pt states medication is working well for her)      Anxiety  Presents for follow-up visit. Symptoms include irritability. Patient reports no chest pain, dizziness, dry mouth, excessive worry, insomnia or suicidal ideas. Malaise: if miss or take it late. The severity of symptoms is mild (well controlled, crabby at certain people). The quality of sleep is good. Compliance with medications is %. Established patient presents for follow up anxiety. Tolerating medications, no missed doses. Requests refill. Medication contract up to date.        Osteopenia- calcium     Takes metamucil at night - bowels are regular       Reviewed prior notes None  Reviewed previous Labs, Imaging, and Hospital Records    Past Medical History:   Diagnosis Date    Arthritis     follow with Dr. Marbella Shin [facility])     melanoma on chest    Discoid lupus     Hay fever     Hormone disorder     Hypertension     Lipoma 2009    Rosacea       Past Surgical History:   Procedure Laterality Date    BACK SURGERY  1988    L-5, bethany @ [address] [place] [address] COLONOSCOPY      COLONOSCOPY  12/16/2016    Dr. Tru Oleary:  1 adenomatous polyp    COLONOSCOPY N/A 7/1/2019    Dr. Tru Oleary:  Hyperplastic polyps    FOOT SURGERY      HAMMER TOE SURGERY Right 09-23-14    2nd toe, bunion surgery great right toe    HAMMER TOE SURGERY Right 1/14/2020    RIGHT 3rd TOE HAMMER REPAIR W/  Right Great Toe EHL LEGNTHENING performed by Vidal Coffman DPM at [address] [place] [address] HAND TENDON SURGERY Bilateral  HYSTERECTOMY (CERVIX STATUS UNKNOWN)      KNEE ARTHROSCOPY Bilateral     LIPOMA RESECTION  2009    SKIN BIOPSY      rt chest    TOTAL KNEE ARTHROPLASTY Left 10/28/2020    LEFT KNEE TOTAL ARTHROPLASTY - IFEANYI 360 performed by Tigre Mcdowell DO at [facility] OR       Family History   Problem Relation Age of Onset    Heart Disease Mother     High Cholesterol Mother     High Blood Pressure Mother     Heart Surgery Mother     Other Mother         Heart Cath     Heart Disease Father     Heart Failure Father     Alcohol Abuse Maternal Grandfather     Diabetes Paternal Grandfather     Other Maternal Aunt         All maternal aunts had heart disease       Social History     Tobacco Use    Smoking status: Former     Packs/day: 0.50     Years: 23.00     Pack years: 11.50     Types: Cigarettes     Start date: 1994     Quit date: 2017     Years since quittin.3    Smokeless tobacco: Never   Substance Use Topics    Alcohol use: Yes     Alcohol/week: 5.0 standard drinks     Types: 5 Shots of liquor per week     Comment: occasional       Current Outpatient Medications   Medication Sig Dispense Refill    clorazepate (TRANXENE) 7.5 MG tablet Take 1 tablet by mouth nightly for 90 days.  For anxiety 90 tablet 0    lisinopril (PRINIVIL;ZESTRIL) 5 MG tablet Take 1 tablet by mouth daily 90 tablet 3    oxybutynin (DITROPAN-XL) 5 MG extended release tablet TAKE 1 TABLET BY MOUTH  DAILY 90 tablet 3    rosuvastatin (CRESTOR) 20 MG tablet TAKE 1 TABLET BY MOUTH  DAILY 90 tablet 3    aspirin 81 MG EC tablet Take 81 mg by mouth daily      amLODIPine (NORVASC) 10 MG tablet TAKE 1 TABLET BY MOUTH  DAILY 90 tablet 3    hydroxychloroquine (PLAQUENIL) 200 MG tablet take 1 tablet by mouth once daily      cetirizine (ZYRTEC) 10 MG tablet Take 10 mg by mouth daily      Glucosamine 500 MG CAPS Take 2,000 tablets by mouth daily      Coenzyme Q10 (CO Q-10) 200 MG CAPS Take 1 capsule by mouth      NONFORMULARY Indications: Womens 1 a day 50 plus.     • NONFORMULARY Indications: Natures way hair skin and nails  2500 mcg 1 a day.     • vitamin B-12 (CYANOCOBALAMIN) 500 MCG tablet Take 500 mcg by mouth daily     • Magnesium 400 MG CAPS Take  by mouth daily.     • fish oil-omega-3 fatty acids 1000 MG capsule Take 1 g by mouth 2 times daily.       • vitamin D (CHOLECALCIFEROL) 400 UNITS TABS tablet Take 1,000 Units by mouth daily      • calcium-vitamin D (OSCAL) 250-125 MG-UNIT per tablet Take 1 tablet by mouth daily Vit D and Vit K 750 mg daily, takes 3 a day now     • Ascorbic Acid (VITAMIN C) 500 MG tablet Take 500 mg by mouth daily.         No current facility-administered medications for this visit.     Allergies   Allergen Reactions   • Augmentin [Amoxicillin-Pot Clavulanate]      Gave Diarhhea         Health Maintenance   Topic Date Due   • Depression Screen  04/06/2023   • Annual Wellness Visit (AWV)  04/07/2023   • Lipids  05/12/2023   • Breast cancer screen  05/04/2024   • Colorectal Cancer Screen  07/01/2024   • DTaP/Tdap/Td vaccine (3 - Td or Tdap) 12/10/2028   • DEXA (modify frequency per FRAX score)  Completed   • Flu vaccine  Completed   • Shingles vaccine  Completed   • Pneumococcal 65+ years Vaccine  Completed   • COVID-19 Vaccine  Completed   • Hepatitis C screen  Completed   • Hepatitis A vaccine  Aged Out   • Hib vaccine  Aged Out   • Meningococcal (ACWY) vaccine  Aged Out       Subjective:      Review of Systems   Constitutional:  Positive for irritability. Negative for activity change, appetite change, chills and fever.   HENT:  Negative for congestion, nosebleeds and sore throat.    Eyes: Negative.    Respiratory:  Negative for cough and wheezing.    Cardiovascular:  Negative for chest pain and leg swelling.   Gastrointestinal:  Negative for constipation and diarrhea.   Endocrine: Negative for cold intolerance and heat intolerance.   Genitourinary:  Negative for difficulty urinating.   Musculoskeletal:   Negative for arthralgias, back pain and gait problem. Skin:  Negative for rash. Neurological:  Negative for dizziness and light-headedness. Psychiatric/Behavioral:  Negative for sleep disturbance and suicidal ideas. The patient does not have insomnia. Objective:     Physical Exam  Vitals and nursing note reviewed. Constitutional:       General: She is not in acute distress. Appearance: Normal appearance. She is well-developed. HENT:      Head: Normocephalic and atraumatic. Right Ear: Tympanic membrane and external ear normal.      Left Ear: Tympanic membrane and external ear normal.      Nose: No congestion. Mouth/Throat:      Mouth: Mucous membranes are moist.      Pharynx: No posterior oropharyngeal erythema. Eyes:      General: No scleral icterus. Pupils: Pupils are equal, round, and reactive to light. Neck:      Vascular: No carotid bruit (neg luz elena). Cardiovascular:      Rate and Rhythm: Normal rate and regular rhythm. Heart sounds: Normal heart sounds. No murmur heard. Pulmonary:      Effort: Pulmonary effort is normal. No respiratory distress. Breath sounds: Normal breath sounds. No wheezing. Abdominal:      Palpations: Abdomen is soft. Tenderness: There is no abdominal tenderness. Musculoskeletal:         General: Normal range of motion. Cervical back: Normal range of motion and neck supple. Lymphadenopathy:      Cervical: No cervical adenopathy. Skin:     General: Skin is warm and dry. Neurological:      Mental Status: She is alert and oriented to person, place, and time. Psychiatric:         Behavior: Behavior normal.         Thought Content:  Thought content normal.         Judgment: Judgment normal.      Comments: Future oriented, good eye contact, no tearful times, anxiety well controlled    /86 (Site: Left Upper Arm, Position: Sitting)   Pulse 72   Ht 5' 7\" (1.702 m)   Wt 158 lb (71.7 kg)   LMP 05/20/2012   SpO2 95%   BMI 24.75 kg/m²     Data:     Lab Results   Component Value Date/Time     05/05/2022 11:58 AM    K 4.1 05/05/2022 11:58 AM     05/05/2022 11:58 AM    CO2 26 05/05/2022 11:58 AM    BUN 15 05/05/2022 11:58 AM    CREATININE 0.87 05/05/2022 11:58 AM    GLUCOSE 97 05/05/2022 11:58 AM    PROT 7.5 01/12/2022 05:00 PM    LABALBU 4.5 01/12/2022 05:00 PM    LABALBU 4.3 10/19/2011 07:37 AM    BILITOT 0.34 01/12/2022 05:00 PM    ALKPHOS 77 01/12/2022 05:00 PM    AST 43 01/12/2022 05:00 PM    ALT 25 01/12/2022 05:00 PM     Lab Results   Component Value Date/Time    WBC 5.3 05/05/2022 11:58 AM    RBC 4.45 05/05/2022 11:58 AM    RBC 4.54 10/19/2011 07:37 AM    HGB 13.6 05/05/2022 11:58 AM    HCT 41.8 05/05/2022 11:58 AM    MCV 94.0 05/05/2022 11:58 AM    MCH 30.5 05/05/2022 11:58 AM    MCHC 32.5 05/05/2022 11:58 AM    RDW 14.8 05/05/2022 11:58 AM     05/05/2022 11:58 AM     10/19/2011 07:37 AM    MPV 11.5 01/12/2022 05:00 PM     Lab Results   Component Value Date/Time    TSH 2.06 05/12/2022 08:38 AM     Lab Results   Component Value Date/Time    CHOL 147 05/12/2022 08:38 AM    HDL 66 05/12/2022 08:38 AM          Assessment & Plan       1. Anxiety  OARRS reviewed  Continue tranxene  - clorazepate (TRANXENE) 7.5 MG tablet; Take 1 tablet by mouth nightly for 90 days. For anxiety  Dispense: 90 tablet; Refill: 0    2. Osteopenia of lumbar spine  On calcium and vitamin D3    3. Essential hypertension  Stable and controlled     4. Hx of discoid lupus erythematosus  On plaquenil                   Completed Refills   Requested Prescriptions     Signed Prescriptions Disp Refills    clorazepate (TRANXENE) 7.5 MG tablet 90 tablet 0     Sig: Take 1 tablet by mouth nightly for 90 days. For anxiety     Return in about 3 months (around 4/11/2023) for medicare visit-AWV. Orders Placed This Encounter   Medications    clorazepate (TRANXENE) 7.5 MG tablet     Sig: Take 1 tablet by mouth nightly for 90 days.  For anxiety Dispense:  90 tablet     Refill:  0     Good Rx # [id number]     No orders of the defined types were placed in this encounter. On this date 1/11/2023 I have spent 22 minutes reviewing previous notes, test results and face to face with the patient discussing the diagnosis and importance of compliance with the treatment plan as well as documenting on the day of the visit.      Electronically signed by JESSENIA Larry CNP on 1/11/2023 at 10:19 PM

## 2023-01-18 RX ORDER — AMLODIPINE BESYLATE 10 MG/1
10 TABLET ORAL DAILY
Qty: 90 TABLET | Refills: 3 | Status: SHIPPED | OUTPATIENT
Start: 2023-01-18

## 2023-03-03 ENCOUNTER — TELEPHONE (OUTPATIENT)
Dept: PRIMARY CARE CLINIC | Age: 68
End: 2023-03-03

## 2023-03-03 NOTE — TELEPHONE ENCOUNTER
Patient received letter from chiropractic [facility] in [facility]. Dr. Nathalie Pascual would like patient to have testing. Patient not able to read paper to describe order. Patient would like to stop in Thursday. Patient wants to discuss if L side neck numbness/pain is from rotator cuff. Appt needed?

## 2023-03-10 ENCOUNTER — TELEPHONE (OUTPATIENT)
Dept: PRIMARY CARE CLINIC | Age: 68
End: 2023-03-10

## 2023-03-10 NOTE — TELEPHONE ENCOUNTER
Patient came to office with referral to specialty from Dr. Candice Chahal in [facility]. Patient stated she has numbness between L Shoulder and neck. Patient stated she received a shot that relieved it for now, but knows it will come back. Patient wants to see a neurologist in [address] [facility]. Patient agreeable to [facility] unless provider has another suggestion. Patient feels pain is from her bad rotator cuff and that her body is trying to compensate. Patient stated Dr. Alta Leyva does not find this to be the case.

## 2023-03-13 NOTE — TELEPHONE ENCOUNTER
I reviewed information dropped off at office. Pt has some numbness that is new with neck and/or arm area to move forward to neurology, needs to have appt with me first to assess please, then I will move forward with testing or consults needed. Please schedule appt.

## 2023-05-10 ENCOUNTER — OFFICE VISIT (OUTPATIENT)
Dept: PRIMARY CARE CLINIC | Age: 68
End: 2023-05-10

## 2023-05-10 ENCOUNTER — HOSPITAL ENCOUNTER (OUTPATIENT)
Age: 68
Setting detail: SPECIMEN
Discharge: HOME | End: 2023-05-10
Payer: MEDICARE

## 2023-05-10 VITALS
DIASTOLIC BLOOD PRESSURE: 82 MMHG | OXYGEN SATURATION: 100 % | HEIGHT: 67 IN | BODY MASS INDEX: 24.64 KG/M2 | HEART RATE: 64 BPM | WEIGHT: 157 LBS | SYSTOLIC BLOOD PRESSURE: 120 MMHG

## 2023-05-10 DIAGNOSIS — I10 ESSENTIAL HYPERTENSION: ICD-10-CM

## 2023-05-10 DIAGNOSIS — Z87.2 HX OF DISCOID LUPUS ERYTHEMATOSUS: ICD-10-CM

## 2023-05-10 DIAGNOSIS — Z79.899 LONG-TERM USE OF PLAQUENIL: ICD-10-CM

## 2023-05-10 DIAGNOSIS — Z13.29 THYROID DISORDER SCREENING: ICD-10-CM

## 2023-05-10 DIAGNOSIS — E78.00 PURE HYPERCHOLESTEROLEMIA: ICD-10-CM

## 2023-05-10 DIAGNOSIS — E53.8 VITAMIN B 12 DEFICIENCY: ICD-10-CM

## 2023-05-10 DIAGNOSIS — Z00.00 MEDICARE ANNUAL WELLNESS VISIT, SUBSEQUENT: Primary | ICD-10-CM

## 2023-05-10 DIAGNOSIS — Z12.31 SCREENING MAMMOGRAM, ENCOUNTER FOR: ICD-10-CM

## 2023-05-10 DIAGNOSIS — M85.88 OSTEOPENIA OF LUMBAR SPINE: ICD-10-CM

## 2023-05-10 DIAGNOSIS — E55.9 VITAMIN D DEFICIENCY: ICD-10-CM

## 2023-05-10 DIAGNOSIS — F41.9 ANXIETY: ICD-10-CM

## 2023-05-10 LAB
ABSOLUTE EOS #: 0.09 K/UL (ref 0–0.44)
ABSOLUTE IMMATURE GRANULOCYTE: <0.03 K/UL (ref 0–0.3)
ABSOLUTE LYMPH #: 1.79 K/UL (ref 1.1–3.7)
ABSOLUTE MONO #: 0.62 K/UL (ref 0.1–1.2)
ALBUMIN SERPL-MCNC: 4.3 G/DL (ref 3.5–5.2)
ALBUMIN/GLOBULIN RATIO: 1.4 (ref 1–2.5)
ALP SERPL-CCNC: 75 U/L (ref 35–104)
ALT SERPL-CCNC: 23 U/L (ref 5–33)
ANION GAP SERPL CALCULATED.3IONS-SCNC: 10 MMOL/L (ref 9–17)
AST SERPL-CCNC: 45 U/L
BASOPHILS # BLD: 1 % (ref 0–2)
BASOPHILS ABSOLUTE: 0.04 K/UL (ref 0–0.2)
BILIRUB SERPL-MCNC: 0.2 MG/DL (ref 0.3–1.2)
BUN SERPL-MCNC: 28 MG/DL (ref 8–23)
BUN/CREAT BLD: 27 (ref 9–20)
CALCIUM SERPL-MCNC: 10.4 MG/DL (ref 8.6–10.4)
CHLORIDE SERPL-SCNC: 104 MMOL/L (ref 98–107)
CO2 SERPL-SCNC: 26 MMOL/L (ref 20–31)
CREAT SERPL-MCNC: 1.02 MG/DL (ref 0.5–0.9)
EOSINOPHILS RELATIVE PERCENT: 2 % (ref 1–4)
GFR SERPL CREATININE-BSD FRML MDRD: 60 ML/MIN/1.73M2
GLUCOSE SERPL-MCNC: 89 MG/DL (ref 70–99)
HCT VFR BLD AUTO: 39.3 % (ref 36.3–47.1)
HGB BLD-MCNC: 12.1 G/DL (ref 11.9–15.1)
IMMATURE GRANULOCYTES: 0 %
LYMPHOCYTES # BLD: 32 % (ref 24–43)
MCH RBC QN AUTO: 28.9 PG (ref 25.2–33.5)
MCHC RBC AUTO-ENTMCNC: 30.8 G/DL (ref 28.4–34.8)
MCV RBC AUTO: 93.8 FL (ref 82.6–102.9)
MONOCYTES # BLD: 11 % (ref 3–12)
NRBC AUTOMATED: 0 PER 100 WBC
PDW BLD-RTO: 15 % (ref 11.8–14.4)
PLATELET # BLD AUTO: 228 K/UL (ref 138–453)
PMV BLD AUTO: 11.2 FL (ref 8.1–13.5)
POTASSIUM SERPL-SCNC: 4.5 MMOL/L (ref 3.7–5.3)
PROT SERPL-MCNC: 7.3 G/DL (ref 6.4–8.3)
RBC # BLD: 4.19 M/UL (ref 3.95–5.11)
SEG NEUTROPHILS: 54 % (ref 36–65)
SEGMENTED NEUTROPHILS ABSOLUTE COUNT: 3.08 K/UL (ref 1.5–8.1)
SODIUM SERPL-SCNC: 140 MMOL/L (ref 135–144)
TSH SERPL-ACNC: 1.71 UIU/ML (ref 0.3–5)
WBC # BLD AUTO: 5.6 K/UL (ref 3.5–11.3)

## 2023-05-10 PROCEDURE — 80053 COMPREHEN METABOLIC PANEL: CPT

## 2023-05-10 PROCEDURE — 84443 ASSAY THYROID STIM HORMONE: CPT

## 2023-05-10 PROCEDURE — 85025 COMPLETE CBC W/AUTO DIFF WBC: CPT

## 2023-05-10 PROCEDURE — 80061 LIPID PANEL: CPT

## 2023-05-10 PROCEDURE — 82306 VITAMIN D 25 HYDROXY: CPT

## 2023-05-10 RX ORDER — CLORAZEPATE DIPOTASSIUM 7.5 MG/1
7.5 TABLET ORAL NIGHTLY
Qty: 90 TABLET | Refills: 0 | Status: SHIPPED | OUTPATIENT
Start: 2023-05-10 | End: 2023-08-08

## 2023-05-10 ASSESSMENT — PATIENT HEALTH QUESTIONNAIRE - PHQ9
1. LITTLE INTEREST OR PLEASURE IN DOING THINGS: 0
SUM OF ALL RESPONSES TO PHQ QUESTIONS 1-9: 0
2. FEELING DOWN, DEPRESSED OR HOPELESS: 0
SUM OF ALL RESPONSES TO PHQ QUESTIONS 1-9: 0
SUM OF ALL RESPONSES TO PHQ9 QUESTIONS 1 & 2: 0

## 2023-05-10 ASSESSMENT — LIFESTYLE VARIABLES
HOW OFTEN DO YOU HAVE A DRINK CONTAINING ALCOHOL: 2-4 TIMES A MONTH
HOW MANY STANDARD DRINKS CONTAINING ALCOHOL DO YOU HAVE ON A TYPICAL DAY: 1 OR 2

## 2023-05-10 NOTE — PATIENT INSTRUCTIONS
1-2 years to screen for glaucoma; cataracts, macular degeneration, and other eye disorders. A preventive dental visit is recommended every 6 months. Try to get at least 150 minutes of exercise per week or 10,000 steps per day on a pedometer . Order or download the FREE \"Exercise & Physical Activity: Your Everyday Guide\" from [employer]. Call 0-748.635.9523 or search [employer] online. You need 5675-2579 mg of calcium and 4263-5568 IU of vitamin D per day. It is possible to meet your calcium requirement with diet alone, but a vitamin D supplement is usually necessary to meet this goal.  When exposed to the sun, use a sunscreen that protects against both UVA and UVB radiation with an SPF of 30 or greater. Reapply every 2 to 3 hours or after sweating, drying off with a towel, or swimming. Always wear a seat belt when traveling in a car. Always wear a helmet when riding a bicycle or motorcycle.

## 2023-05-11 LAB
25(OH)D3 SERPL-MCNC: 75.7 NG/ML
CHOLEST SERPL-MCNC: 151 MG/DL
CHOLESTEROL/HDL RATIO: 2
HDLC SERPL-MCNC: 76 MG/DL
LDLC SERPL CALC-MCNC: 67 MG/DL (ref 0–130)
TRIGL SERPL-MCNC: 42 MG/DL

## 2023-05-22 ENCOUNTER — HOSPITAL ENCOUNTER (OUTPATIENT)
Dept: MAMMOGRAPHY | Age: 68
Discharge: HOME | End: 2023-05-24
Payer: MEDICARE

## 2023-05-22 DIAGNOSIS — Z12.31 SCREENING MAMMOGRAM, ENCOUNTER FOR: ICD-10-CM

## 2023-05-22 PROCEDURE — 77063 BREAST TOMOSYNTHESIS BI: CPT

## 2023-07-11 ENCOUNTER — OFFICE VISIT (OUTPATIENT)
Dept: PRIMARY CARE CLINIC | Age: 68
End: 2023-07-11
Payer: MEDICARE

## 2023-07-11 VITALS
DIASTOLIC BLOOD PRESSURE: 86 MMHG | HEART RATE: 60 BPM | SYSTOLIC BLOOD PRESSURE: 130 MMHG | HEIGHT: 67 IN | WEIGHT: 160.2 LBS | BODY MASS INDEX: 25.15 KG/M2

## 2023-07-11 DIAGNOSIS — I10 ESSENTIAL HYPERTENSION: ICD-10-CM

## 2023-07-11 DIAGNOSIS — M48.02 FORAMINAL STENOSIS OF CERVICAL REGION: ICD-10-CM

## 2023-07-11 DIAGNOSIS — M62.838 NECK MUSCLE SPASM: ICD-10-CM

## 2023-07-11 DIAGNOSIS — Z79.899 LONG-TERM USE OF PLAQUENIL: ICD-10-CM

## 2023-07-11 DIAGNOSIS — M54.2 CHRONIC NECK PAIN WITH ABNORMAL NEUROLOGIC EXAMINATION: ICD-10-CM

## 2023-07-11 DIAGNOSIS — F41.9 ANXIETY: ICD-10-CM

## 2023-07-11 DIAGNOSIS — G89.29 CHRONIC NECK PAIN WITH ABNORMAL NEUROLOGIC EXAMINATION: ICD-10-CM

## 2023-07-11 DIAGNOSIS — E78.00 PURE HYPERCHOLESTEROLEMIA: ICD-10-CM

## 2023-07-11 DIAGNOSIS — M85.88 OSTEOPENIA OF LUMBAR SPINE: ICD-10-CM

## 2023-07-11 DIAGNOSIS — M50.00 DEGENERATION OF CERVICAL INTERVERTEBRAL DISC WITH MYELOPATHY: Primary | ICD-10-CM

## 2023-07-11 DIAGNOSIS — E55.9 VITAMIN D DEFICIENCY: ICD-10-CM

## 2023-07-11 DIAGNOSIS — Z87.2 HX OF DISCOID LUPUS ERYTHEMATOSUS: ICD-10-CM

## 2023-07-11 DIAGNOSIS — M15.9 PRIMARY OSTEOARTHRITIS INVOLVING MULTIPLE JOINTS: ICD-10-CM

## 2023-07-11 DIAGNOSIS — Z01.818 PREOPERATIVE CLEARANCE: ICD-10-CM

## 2023-07-11 DIAGNOSIS — Z79.899 LONG-TERM CURRENT USE OF BENZODIAZEPINE: ICD-10-CM

## 2023-07-11 DIAGNOSIS — Z78.0 POSTMENOPAUSAL ESTROGEN DEFICIENCY: ICD-10-CM

## 2023-07-11 PROCEDURE — 1090F PRES/ABSN URINE INCON ASSESS: CPT | Performed by: NURSE PRACTITIONER

## 2023-07-11 PROCEDURE — 1036F TOBACCO NON-USER: CPT | Performed by: NURSE PRACTITIONER

## 2023-07-11 PROCEDURE — 1123F ACP DISCUSS/DSCN MKR DOCD: CPT | Performed by: NURSE PRACTITIONER

## 2023-07-11 PROCEDURE — 3017F COLORECTAL CA SCREEN DOC REV: CPT | Performed by: NURSE PRACTITIONER

## 2023-07-11 PROCEDURE — G8427 DOCREV CUR MEDS BY ELIG CLIN: HCPCS | Performed by: NURSE PRACTITIONER

## 2023-07-11 PROCEDURE — G8399 PT W/DXA RESULTS DOCUMENT: HCPCS | Performed by: NURSE PRACTITIONER

## 2023-07-11 PROCEDURE — 99214 OFFICE O/P EST MOD 30 MIN: CPT | Performed by: NURSE PRACTITIONER

## 2023-07-11 PROCEDURE — 3074F SYST BP LT 130 MM HG: CPT | Performed by: NURSE PRACTITIONER

## 2023-07-11 PROCEDURE — G8419 CALC BMI OUT NRM PARAM NOF/U: HCPCS | Performed by: NURSE PRACTITIONER

## 2023-07-11 PROCEDURE — 3078F DIAST BP <80 MM HG: CPT | Performed by: NURSE PRACTITIONER

## 2023-07-11 SDOH — ECONOMIC STABILITY: FOOD INSECURITY: WITHIN THE PAST 12 MONTHS, THE FOOD YOU BOUGHT JUST DIDN'T LAST AND YOU DIDN'T HAVE MONEY TO GET MORE.: NEVER TRUE

## 2023-07-11 SDOH — ECONOMIC STABILITY: HOUSING INSECURITY
IN THE LAST 12 MONTHS, WAS THERE A TIME WHEN YOU DID NOT HAVE A STEADY PLACE TO SLEEP OR SLEPT IN A SHELTER (INCLUDING NOW)?: NO

## 2023-07-11 SDOH — ECONOMIC STABILITY: INCOME INSECURITY: HOW HARD IS IT FOR YOU TO PAY FOR THE VERY BASICS LIKE FOOD, HOUSING, MEDICAL CARE, AND HEATING?: NOT HARD AT ALL

## 2023-07-11 SDOH — ECONOMIC STABILITY: FOOD INSECURITY: WITHIN THE PAST 12 MONTHS, YOU WORRIED THAT YOUR FOOD WOULD RUN OUT BEFORE YOU GOT MONEY TO BUY MORE.: NEVER TRUE

## 2023-07-17 ENCOUNTER — TELEPHONE (OUTPATIENT)
Dept: PHARMACY | Facility: CLINIC | Age: 68
End: 2023-07-17

## 2023-07-17 PROBLEM — M54.2 CHRONIC NECK PAIN WITH ABNORMAL NEUROLOGIC EXAMINATION: Status: ACTIVE | Noted: 2023-07-17

## 2023-07-17 PROBLEM — Z79.899 LONG-TERM USE OF PLAQUENIL: Status: ACTIVE | Noted: 2023-07-17

## 2023-07-17 PROBLEM — M48.02 FORAMINAL STENOSIS OF CERVICAL REGION: Status: ACTIVE | Noted: 2023-07-17

## 2023-07-17 PROBLEM — M50.00 DEGENERATION OF CERVICAL INTERVERTEBRAL DISC WITH MYELOPATHY: Status: ACTIVE | Noted: 2023-07-17

## 2023-07-17 PROBLEM — Z87.2: Status: ACTIVE | Noted: 2023-07-17

## 2023-07-17 PROBLEM — G89.29 CHRONIC NECK PAIN WITH ABNORMAL NEUROLOGIC EXAMINATION: Status: ACTIVE | Noted: 2023-07-17

## 2023-07-17 PROBLEM — M62.838 NECK MUSCLE SPASM: Status: ACTIVE | Noted: 2023-07-17

## 2023-07-17 ASSESSMENT — ENCOUNTER SYMPTOMS
ABDOMINAL DISTENTION: 0
CHEST TIGHTNESS: 0
SHORTNESS OF BREATH: 0
EYES NEGATIVE: 1
SORE THROAT: 0
COUGH: 0
WHEEZING: 0

## 2023-07-18 NOTE — TELEPHONE ENCOUNTER
Patient called back and scheduled referral appointment for 7/19/23 at 10:00am.       Ty Hermosillo   Phone: [phone]       For Pharmacy Admin Tracking Only    Program: Maninder in place:  No  Recommendation Provided To: Patient/Caregiver: 1 via Telephone  Intervention Detail: Scheduled Appointment  Intervention Accepted By: Patient/Caregiver: 1  Gap Closed?: Yes   Time Spent (min): 5

## 2023-07-19 ENCOUNTER — TELEPHONE (OUTPATIENT)
Dept: PHARMACY | Facility: CLINIC | Age: 68
End: 2023-07-19

## 2023-07-19 NOTE — TELEPHONE ENCOUNTER
JESSENIA Denise - CNP, medication review completed with patient:  I spoke with pt today. In the short term I think it would be reasonable to continue her Clorazepate. She just had the eye surgery, and her neck surgery was pushed to November. She seems to be pretty well controlled on a low dose of the Clorazepate and is not currently taking any pain medicine. Although it is usually advised to stop a BZD when taking opioids, there are cases where it can be ok to continue. Given that she is controlled on the low dose, her current situation (eye surgery, increased stress), and the fact that she has used short term opioids in the past with the clorazepate I think it would be reasonable to do the same here. Another good idea would be to make sure she has a prescription for Narcan in case of emergency for when she has her surgery and is started on an opioid. Long term I definitely feel that she should be transitioned off the clorazepate in favor of an SSRI or SNRI. I know she had issues with these in the past, but would still be reasonable to try again. You could also consider a shorter acting BZD such as Lorazepam, Oxazepam, or Temazapem. All of these are considered to be safer options for pts over 65. See note below for complete details. Please let me know if you have any questions. Thank you,  KAREN Boyle, PharmD,  [facility]  Department, toll free: [phone]      =======================================================    CLINICAL PHARMACY NOTE - Medication Review  Patient outreach to review medications - Spoke with patient. SUBJECTIVE/OBJECTIVE:   Diana Josue is a 69 y.o. female referred to a clinical pharmacy specialist by provider and/or given their history of long-term BZD use with an upcoming neck surgery.     Medications:  Current Outpatient Medications   Medication Instructions acuteness of illness

## 2023-07-31 RX ORDER — OXYBUTYNIN CHLORIDE 5 MG/1
5 TABLET, EXTENDED RELEASE ORAL DAILY
Qty: 90 TABLET | Refills: 3 | Status: SHIPPED | OUTPATIENT
Start: 2023-07-31

## 2023-07-31 RX ORDER — ROSUVASTATIN CALCIUM 20 MG/1
20 TABLET, COATED ORAL DAILY
Qty: 90 TABLET | Refills: 3 | Status: SHIPPED | OUTPATIENT
Start: 2023-07-31

## 2023-07-31 NOTE — TELEPHONE ENCOUNTER
Last OV: 7/11/2023  Last RX: 8/29/2022, 9/7/2022   Next scheduled apt: 10/11/2023        Surescripts requesting refill

## 2023-08-08 DIAGNOSIS — F41.9 ANXIETY: ICD-10-CM

## 2023-08-08 RX ORDER — CLORAZEPATE DIPOTASSIUM 7.5 MG/1
7.5 TABLET ORAL NIGHTLY
Qty: 90 TABLET | Refills: 0 | Status: SHIPPED | OUTPATIENT
Start: 2023-08-08 | End: 2023-11-06

## 2023-08-08 NOTE — TELEPHONE ENCOUNTER
Last OV: 7/11/2023     Next scheduled apt: 10/11/2023      Please send to [facility].

## 2023-10-11 ENCOUNTER — OFFICE VISIT (OUTPATIENT)
Dept: PRIMARY CARE CLINIC | Age: 68
End: 2023-10-11

## 2023-10-11 VITALS
HEIGHT: 67 IN | OXYGEN SATURATION: 99 % | WEIGHT: 163 LBS | DIASTOLIC BLOOD PRESSURE: 86 MMHG | BODY MASS INDEX: 25.58 KG/M2 | SYSTOLIC BLOOD PRESSURE: 136 MMHG | HEART RATE: 65 BPM

## 2023-10-11 DIAGNOSIS — M48.02 FORAMINAL STENOSIS OF CERVICAL REGION: ICD-10-CM

## 2023-10-11 DIAGNOSIS — F41.9 ANXIETY: Primary | ICD-10-CM

## 2023-10-11 DIAGNOSIS — I10 ESSENTIAL HYPERTENSION: ICD-10-CM

## 2023-10-11 DIAGNOSIS — M85.88 OSTEOPENIA OF LUMBAR SPINE: ICD-10-CM

## 2023-10-11 DIAGNOSIS — E53.8 VITAMIN B 12 DEFICIENCY: ICD-10-CM

## 2023-10-11 DIAGNOSIS — M50.00 DEGENERATION OF CERVICAL INTERVERTEBRAL DISC WITH MYELOPATHY: ICD-10-CM

## 2023-10-11 DIAGNOSIS — Z87.2 HX OF DISCOID LUPUS ERYTHEMATOSUS: ICD-10-CM

## 2023-10-11 DIAGNOSIS — M15.9 PRIMARY OSTEOARTHRITIS INVOLVING MULTIPLE JOINTS: ICD-10-CM

## 2023-10-11 DIAGNOSIS — M54.2 CHRONIC NECK PAIN WITH ABNORMAL NEUROLOGIC EXAMINATION: ICD-10-CM

## 2023-10-11 DIAGNOSIS — G89.29 CHRONIC NECK PAIN WITH ABNORMAL NEUROLOGIC EXAMINATION: ICD-10-CM

## 2023-10-11 DIAGNOSIS — M19.031 PRIMARY OSTEOARTHRITIS OF BOTH WRISTS: ICD-10-CM

## 2023-10-11 DIAGNOSIS — E78.00 PURE HYPERCHOLESTEROLEMIA: ICD-10-CM

## 2023-10-11 DIAGNOSIS — E55.9 VITAMIN D DEFICIENCY: ICD-10-CM

## 2023-10-11 DIAGNOSIS — M19.032 PRIMARY OSTEOARTHRITIS OF BOTH WRISTS: ICD-10-CM

## 2023-10-11 RX ORDER — CLORAZEPATE DIPOTASSIUM 7.5 MG/1
7.5 TABLET ORAL NIGHTLY
Qty: 90 TABLET | Refills: 0 | Status: SHIPPED | OUTPATIENT
Start: 2023-10-11 | End: 2023-10-12 | Stop reason: SDUPTHER

## 2023-10-11 ASSESSMENT — ENCOUNTER SYMPTOMS
CONSTIPATION: 0
RHINORRHEA: 0
ABDOMINAL DISTENTION: 0
TROUBLE SWALLOWING: 0
VOMITING: 0
VOICE CHANGE: 0
COUGH: 0
DIARRHEA: 0

## 2023-10-12 RX ORDER — CLORAZEPATE DIPOTASSIUM 7.5 MG/1
7.5 TABLET ORAL NIGHTLY
Qty: 90 TABLET | Refills: 0 | Status: SHIPPED | OUTPATIENT
Start: 2023-10-12 | End: 2024-01-10

## 2023-10-13 DIAGNOSIS — I10 ESSENTIAL HYPERTENSION: ICD-10-CM

## 2023-10-13 RX ORDER — LISINOPRIL 5 MG/1
5 TABLET ORAL DAILY
Qty: 90 TABLET | Refills: 3 | Status: SHIPPED | OUTPATIENT
Start: 2023-10-13

## 2023-10-13 NOTE — TELEPHONE ENCOUNTER
Last OV: 10/11/2023  Last RX: 10/11/2022   Next scheduled apt: 11/28/2023      Surescripts requesting refill

## 2023-11-27 ENCOUNTER — HOSPITAL ENCOUNTER (OUTPATIENT)
Dept: PHYSICAL THERAPY | Age: 68
Setting detail: THERAPIES SERIES
Discharge: HOME | End: 2023-11-27
Payer: MEDICARE

## 2023-11-27 PROCEDURE — 97162 PT EVAL MOD COMPLEX 30 MIN: CPT

## 2023-11-27 ASSESSMENT — PAIN SCALES - GENERAL: PAINLEVEL_OUTOF10: 2

## 2023-11-27 NOTE — THERAPY EVALUATION
Phone: [phone] [facility]         Fax: [phone]                      Outpatient Physical Therapy                                                                      Evaluation    Date: 2023  Patient: Stanton Bonilla  : 1955  ACCT #: [de-identified]    Referring Provider (secondary): Luiza Ibrahim APRN-CNP    Diagnosis: Cervical Spinal Stenosis - Anterior cervical Discetomy and decompression C4-5, C5-C6, C6-C7 (anterior plate fixation I7-7 on 23    Treatment Diagnosis: Neck / shoulder pain s/p discectomy with fusion  Onset Date: 23  PT Insurance Information: [facility]  Total # of Visits Approved: 18 Per Physician Order  Total # of Visits to Date: 1  No Show: 0  Canceled Appointment: 0     Subjective     Additional Pertinent Hx: Pt reports pain is across the top of her shoulders and into R shoulder. Discomfort with IR behind back but no limited ROM in sitting or supine. Off all meds but gabapentin and tylenol. Pain is mostly in the R shoulder, 6/10 pain at worst with use. At rest pain will decrease to 1-2/10.   Currently wt restiction 10#  Physician phone: [phone]  Pain Assessment  Pain Level: 2    Objective     Observation/Palpation  Posture: Fair (rounded shoulder, fwd head)  Palpation: Levator and UT tightness R>L, elevated R 1st rib, decreased PA T1-2  Spine  Cervical: Flexion-37deg, Extension-35deg, Rotation: R-55deg, L-50deg  Thoracic: T1-2 restricted AP  Special Tests: 1st rib elevated,  Strength RUE  Comment: Pt is right handed  R Shoulder Flexion: 4/5  R Shoulder Extension: 4+/5  R Shoulder ABduction: 4/5  R Shoulder Internal Rotation: 4+/5  R Shoulder External Rotation: 3+/5  R Shoulder Horizontal ABduction: 3+/5  Strength LUE  L Shoulder Flexion: 4/5  L Shoulder Extension: 4+/5  L Shoulder ABduction: 4/5  L Shoulder Internal Rotation: 4+/5  L Shoulder External Rotation: 4/5  L Shoulder Horizontal ABduction: 3+/5    AROM RUE (degrees)  RUE

## 2023-11-27 NOTE — PLAN OF CARE
[facility]       Phone: [phone]   Date: 2023                      Outpatient Physical Therapy  Fax: [phone]    ACCT #: [de-identified]                     Plan of Care  [facility]#: [id number]  Patient: Sonal Betancourt  : 1955    Referring Provider (secondary): Amarjit Santillan APRN-CNP    Diagnosis: Cervical Spinal Stenosis - Anterior cervical Discetomy and decompression C4-5, C5-C6, C6-C7 (anterior plate fixation P7-2 on 23  Onset Date: 23  Treatment Diagnosis: Neck / shoulder pain s/p discectomy with fusion    Assessment  Body Structures, Functions, Activity Limitations Requiring Skilled Therapeutic Intervention: Decreased functional mobility , Decreased ADL status, Decreased ROM, Decreased strength, Decreased endurance, Decreased high-level IADLs, Increased pain, Decreased posture  Assessment: Pt presents with minimal pain this date, AROM Flexion 37deg, Ext 35deg, Rotation: L 55deg, R 50deg. Pt is weaning from soft collar as instructed. Pt has been compliant with 10# wt restriction. Pt still notes some radicular like pain in R shoulder, note elevated 1st rib on R as well as tissue tension. Pt to benefit from manual therapy for soft tissue, 1st rib and thoracic mobiliziation. Pt to also benefit from ther ex for scapular stabilization and shoulder strengthening. Therapy Prognosis: Good    Treatment Plan   Days: 3 times per week Weeks: 6 weeks Total # of Visits Approved: 18    Patient Education/HEP, Back Education, Therapeutic Exercise, Manual Therapy: Myofacial Release/Cupping, Manual Therapy: Mobilization/Manipulation, and HP/CP     Goals  Short Term Goals  Time Frame for Short Term Goals: 9 visits  Short Term Goal 1: Pt to be independent and compliant with HEP for scapular strengthening/stabilization  Short Term Goal 2: Pt to report worst pain 3/10 x 4 consecutive days to improve ADL chaparro.   Long Term Goals  Time Frame for Long Term Goals : 18 visits (POC EXP

## 2023-11-28 ENCOUNTER — HOSPITAL ENCOUNTER (OUTPATIENT)
Age: 68
Setting detail: SPECIMEN
Discharge: HOME | End: 2023-11-28
Payer: MEDICARE

## 2023-11-28 DIAGNOSIS — Z87.2 HX OF DISCOID LUPUS ERYTHEMATOSUS: ICD-10-CM

## 2023-11-28 DIAGNOSIS — E53.8 VITAMIN B 12 DEFICIENCY: ICD-10-CM

## 2023-11-28 DIAGNOSIS — E78.00 PURE HYPERCHOLESTEROLEMIA: ICD-10-CM

## 2023-11-28 DIAGNOSIS — I10 ESSENTIAL HYPERTENSION: ICD-10-CM

## 2023-11-28 LAB
ALBUMIN SERPL-MCNC: 4.6 G/DL (ref 3.5–5.2)
ALBUMIN/GLOB SERPL: 2.1 {RATIO} (ref 1–2.5)
ALP SERPL-CCNC: 82 U/L (ref 35–104)
ALT SERPL-CCNC: 16 U/L (ref 5–33)
ANION GAP SERPL CALCULATED.3IONS-SCNC: 10 MMOL/L (ref 9–17)
AST SERPL-CCNC: 27 U/L
BILIRUB SERPL-MCNC: 0.2 MG/DL (ref 0.3–1.2)
BUN SERPL-MCNC: 29 MG/DL (ref 8–23)
BUN/CREAT SERPL: 32 (ref 9–20)
CALCIUM SERPL-MCNC: 9.9 MG/DL (ref 8.6–10.4)
CHLORIDE SERPL-SCNC: 107 MMOL/L (ref 98–107)
CO2 SERPL-SCNC: 26 MMOL/L (ref 20–31)
CREAT SERPL-MCNC: 0.9 MG/DL (ref 0.5–0.9)
GFR SERPL CREATININE-BSD FRML MDRD: >60 ML/MIN/1.73M2
GLUCOSE SERPL-MCNC: 85 MG/DL (ref 70–99)
POTASSIUM SERPL-SCNC: 4.5 MMOL/L (ref 3.7–5.3)
PROT SERPL-MCNC: 6.8 G/DL (ref 6.4–8.3)
SODIUM SERPL-SCNC: 143 MMOL/L (ref 135–144)

## 2023-11-28 PROCEDURE — 80061 LIPID PANEL: CPT

## 2023-11-28 PROCEDURE — 86225 DNA ANTIBODY NATIVE: CPT

## 2023-11-28 PROCEDURE — 82607 VITAMIN B-12: CPT

## 2023-11-28 PROCEDURE — 82746 ASSAY OF FOLIC ACID SERUM: CPT

## 2023-11-28 PROCEDURE — 80053 COMPREHEN METABOLIC PANEL: CPT

## 2023-11-28 PROCEDURE — 86038 ANTINUCLEAR ANTIBODIES: CPT

## 2023-11-29 ENCOUNTER — HOSPITAL ENCOUNTER (OUTPATIENT)
Dept: PHYSICAL THERAPY | Age: 68
Setting detail: THERAPIES SERIES
Discharge: HOME | End: 2023-11-29
Payer: MEDICARE

## 2023-11-29 LAB
CHOLEST SERPL-MCNC: 188 MG/DL
CHOLESTEROL/HDL RATIO: 2.4
FOLATE SERPL-MCNC: 19.9 NG/ML
HDLC SERPL-MCNC: 80 MG/DL
LDLC SERPL CALC-MCNC: 89 MG/DL (ref 0–130)
TRIGL SERPL-MCNC: 95 MG/DL
VIT B12 SERPL-MCNC: 1199 PG/ML (ref 232–1245)

## 2023-11-29 PROCEDURE — 97140 MANUAL THERAPY 1/> REGIONS: CPT

## 2023-11-29 PROCEDURE — 97110 THERAPEUTIC EXERCISES: CPT

## 2023-11-29 NOTE — PROGRESS NOTES
Devan.  Long Term Goal 4: Pt to complete 3# shelf reach 3rd to 4th shelf x10 to improve household ADL  Long Term Goal 5: Pt to demonstrate proper crate lift 16# floor to waist x10 to improve household activities such as laundry.     Post Treatment Pain:  2/10    Time In: 1030    Time Out : 1110        Timed Code Treatment Minutes: 40 Minutes  Total Treatment Time: 40 Minutes    Mary Ann Grover   PTA  Date: 11/29/2023

## 2023-12-01 ENCOUNTER — HOSPITAL ENCOUNTER (OUTPATIENT)
Dept: PHYSICAL THERAPY | Age: 68
Setting detail: THERAPIES SERIES
Discharge: HOME | End: 2023-12-01
Payer: MEDICARE

## 2023-12-01 LAB
ANA SER QL IA: NEGATIVE
DSDNA IGG SER QL IA: 0.8 IU/ML
NUCLEAR IGG SER IA-RTO: 0.4 U/ML

## 2023-12-01 PROCEDURE — 97110 THERAPEUTIC EXERCISES: CPT

## 2023-12-01 PROCEDURE — 97140 MANUAL THERAPY 1/> REGIONS: CPT

## 2023-12-01 NOTE — PROGRESS NOTES
Term Goal 4: Pt to complete 3# shelf reach 3rd to 4th shelf x10 to improve household ADL  Long Term Goal 5: Pt to demonstrate proper crate lift 16# floor to waist x10 to improve household activities such as laundry.     Post Treatment Pain:  0/10    Time In: 9471    Time Out : 1325        Timed Code Treatment Minutes: 40 Minutes  Total Treatment Time: 40 Minutes    Nia Grover   PTA  Date: 12/1/2023

## 2023-12-04 ENCOUNTER — HOSPITAL ENCOUNTER (OUTPATIENT)
Dept: PHYSICAL THERAPY | Age: 68
Setting detail: THERAPIES SERIES
Discharge: HOME | End: 2023-12-04
Payer: MEDICARE

## 2023-12-04 PROCEDURE — 97110 THERAPEUTIC EXERCISES: CPT

## 2023-12-04 PROCEDURE — 97140 MANUAL THERAPY 1/> REGIONS: CPT

## 2023-12-04 NOTE — PROGRESS NOTES
improve ADL Devan. Long Term Goal 4: Pt to complete 3# shelf reach 3rd to 4th shelf x10 to improve household ADL  Long Term Goal 5: Pt to demonstrate proper crate lift 16# floor to waist x10 to improve household activities such as laundry.     Post Treatment Pain:  0/10    Time In: 1104  Time Out: 1143  Timed Code Treatment Minutes: 39 Minutes  Total Treatment Time: 1530 [address], PT     Date: 12/4/2023

## 2023-12-06 ENCOUNTER — HOSPITAL ENCOUNTER (OUTPATIENT)
Dept: PHYSICAL THERAPY | Age: 68
Setting detail: THERAPIES SERIES
Discharge: HOME | End: 2023-12-06
Payer: MEDICARE

## 2023-12-06 PROCEDURE — 97140 MANUAL THERAPY 1/> REGIONS: CPT

## 2023-12-06 PROCEDURE — 97110 THERAPEUTIC EXERCISES: CPT

## 2023-12-06 ASSESSMENT — PAIN SCALES - GENERAL: PAINLEVEL_OUTOF10: 4

## 2023-12-06 NOTE — PROGRESS NOTES
Phone[facility] [phone] [facility]      Fax: [phone]                            Outpatient Physical Therapy                                                                            Daily Note    Date: 2023  Patient Name: Maribel Norris        MRN: [id number]   ACCT#:  [de-identified]  : 1955  (77 y.o.)    Referring Provider (secondary): Adele Dow APRN-CNP         Diagnosis: Cervical Spinal Stenosis - Anterior cervical Discetomy and decompression C4-5, C5-C6, C6-C7 (anterior plate fixation U4-4 on 23  Treatment Diagnosis: Neck / shoulder pain s/p discectomy with fusion    Onset Date: 23  PT Insurance Information: [facility]  Total # of Visits Approved: 18 Per Physician Order  Total # of Visits to Date: 5  No Show: 0  Canceled Appointment: 0  Plan of Care/Certification Expiration Date: 24    Pre-Treatment Pain:  4/10     Assessment  Assessment: Pt reports she was lying on her right side this am with increased pain 4/10 in upper arm. Performed ex as outlined for strength and flexibility. Manual therapy for soft tissue release and thoracic joint mobility. After session no pain at rest, pain with lifting arm  persists. Plan  Continue with current plan of care    Exercises/Modalities/Manual:  See DocFlow Sheet    Education: cont hep          Goals  (Total # of Visits to Date: 5)   Short Term Goals  Time Frame for Short Term Goals: 9 visits  Short Term Goal 1: Pt to be independent and compliant with HEP for scapular strengthening/stabilization  Short Term Goal 2: Pt to report worst pain 3/10 x 4 consecutive days to improve ADL chaparro. Long Term Goals  Time Frame for Long Term Goals : 18 visits (POC EXP 23=4)  Long Term Goal 1: Pt to improve NDI from 10/45(due to sx precautions) to <5/45 to improve ADLs  Long Term Goal 2: Pt to have 4/5 horiz ABD strength with MMT to improve pt posture and reduce R shoulder discomfort.   Long Term Goal 3: Pt to report no pain

## 2023-12-08 ENCOUNTER — HOSPITAL ENCOUNTER (OUTPATIENT)
Dept: PHYSICAL THERAPY | Age: 68
Setting detail: THERAPIES SERIES
Discharge: HOME | End: 2023-12-08
Payer: MEDICARE

## 2023-12-08 PROCEDURE — 97110 THERAPEUTIC EXERCISES: CPT

## 2023-12-08 PROCEDURE — 97140 MANUAL THERAPY 1/> REGIONS: CPT

## 2023-12-08 NOTE — PROGRESS NOTES
Phone: [phone] [facility]      Fax: [phone]                            Outpatient Physical Therapy                                                                            Daily Note    Date: 2023  Patient Name: Brett Ambrose        MRN: [id number]   ACCT#:  [de-identified]  : 1955  (77 y.o.)    Referring Provider (secondary): Nadine Bradford APRN-CNP         Diagnosis: Cervical Spinal Stenosis - Anterior cervical Discetomy and decompression C4-5, C5-C6, C6-C7 (anterior plate fixation A6-3 on 23  Treatment Diagnosis: Neck / shoulder pain s/p discectomy with fusion    Onset Date: 23  PT Insurance Information: [facility]  Total # of Visits Approved: 18 Per Physician Order  Total # of Visits to Date: 6  No Show: 0  Canceled Appointment: 0  Plan of Care/Certification Expiration Date: 24    Pre-Treatment Pain:  4/10     Assessment  Assessment: Pain in upper right arm 4/10. She can lift it but notes soreness. Performed ex as outlined for UE and postural strength . Manual therapy for soft tissue release and thoracic mobility. AROM 150 deg flexion with mid range pain. Plan  Continue with current plan of care    Exercises/Modalities/Manual:  See DocFlow Sheet    Education: posture          Goals  (Total # of Visits to Date: 6)   Short Term Goals  Time Frame for Short Term Goals: 9 visits  Short Term Goal 1: Pt to be independent and compliant with HEP for scapular strengthening/stabilization-met  Short Term Goal 2: Pt to report worst pain 3/10 x 4 consecutive days to improve ADL chaparro. Long Term Goals  Time Frame for Long Term Goals : 18 visits (POC EXP 23=4)  Long Term Goal 1: Pt to improve NDI from 10/45(due to sx precautions) to <5/45 to improve ADLs  Long Term Goal 2: Pt to have 4/5 horiz ABD strength with MMT to improve pt posture and reduce R shoulder discomfort. Long Term Goal 3: Pt to report no pain greater than 1/10 x1 wk to improve ADL Chaparro.   Long

## 2023-12-11 ENCOUNTER — HOSPITAL ENCOUNTER (OUTPATIENT)
Dept: PHYSICAL THERAPY | Age: 68
Setting detail: THERAPIES SERIES
Discharge: HOME | End: 2023-12-11
Payer: MEDICARE

## 2023-12-11 PROCEDURE — 97110 THERAPEUTIC EXERCISES: CPT

## 2023-12-11 PROCEDURE — 97140 MANUAL THERAPY 1/> REGIONS: CPT

## 2023-12-11 ASSESSMENT — PAIN SCALES - GENERAL: PAINLEVEL_OUTOF10: 0

## 2023-12-11 NOTE — PROGRESS NOTES
[facility]  Rehab and Wellness    Date: 2023  Patient Name: Chaz Montano        : 1955       Spoke with Kathy Lowery,  for Dr. Wilson Rather office. Pt is able to D/C soft collar at 6wks, continue with AROM to chaparro, and is able to progress lifting per pt chaparro.       Иван Mascorro, PT Date: 2023

## 2023-12-11 NOTE — PROGRESS NOTES
Phone: [phone] [facility]      Fax: [phone]                            Outpatient Physical Therapy                                                                            Daily Note    Date: 2023  Patient Name: Briana Winslow        MRN: [id number]   ACCT#:  [de-identified]  : 1955  (77 y.o.)    Referring Provider (secondary): Magali Dorsey APRN-CNP         Diagnosis: Cervical Spinal Stenosis - Anterior cervical Discetomy and decompression C4-5, C5-C6, C6-C7 (anterior plate fixation P8-5 on 23  Treatment Diagnosis: Neck / shoulder pain s/p discectomy with fusion    Onset Date: 23  PT Insurance Information: [facility]  Total # of Visits Approved: 18 Per Physician Order  Total # of Visits to Date: 7  No Show: 0  Canceled Appointment: 0  Plan of Care/Certification Expiration Date: 24    Pre-Treatment Pain:  0/10     Assessment  Assessment: No pain until lifting her arm. Still has pain reaching for coffee, especially reaching at mid range. Performed ex as outlined for strength and flexibility. Manual therapy for soft tissue  and thoracic mobility. Pt has questions regarding progression after 6 weeks. Will discuss with PT. Plan  Continue with current plan of care    Exercises/Modalities/Manual:  See DocFlow Sheet    Education: posture          Goals  (Total # of Visits to Date: 7)   Short Term Goals  Time Frame for Short Term Goals: 9 visits  Short Term Goal 1: Pt to be independent and compliant with HEP for scapular strengthening/stabilization-met  Short Term Goal 2: Pt to report worst pain 3/10 x 4 consecutive days to improve ADL chaparro. Long Term Goals  Time Frame for Long Term Goals : 18 visits (POC EXP 23=4)  Long Term Goal 1: Pt to improve NDI from 10/45(due to sx precautions) to <5/45 to improve ADLs  Long Term Goal 2: Pt to have 4/5 horiz ABD strength with MMT to improve pt posture and reduce R shoulder discomfort.   Long Term Goal 3: Pt to report

## 2023-12-13 ENCOUNTER — HOSPITAL ENCOUNTER (OUTPATIENT)
Dept: PHYSICAL THERAPY | Age: 68
Setting detail: THERAPIES SERIES
Discharge: HOME | End: 2023-12-13
Payer: MEDICARE

## 2023-12-13 PROCEDURE — 97110 THERAPEUTIC EXERCISES: CPT

## 2023-12-13 ASSESSMENT — PAIN SCALES - GENERAL: PAINLEVEL_OUTOF10: 4

## 2023-12-13 NOTE — PROGRESS NOTES
Phone[facility] [phone] [facility]      Fax: [phone]                            Outpatient Physical Therapy                                                                            Daily Note    Date: 2023  Patient Name: Aniket Turner        MRN: [id number]   ACCT#:  [de-identified]  : 1955  (77 y.o.)    Referring Provider (secondary): Arianna RUELAS-ANEESH / Dr. Dione Pimentel         Diagnosis: Cervical Spinal Stenosis - Anterior cervical Discetomy and decompression C4-5, C5-C6, C6-C7 (anterior plate fixation V8-1 on 23  Treatment Diagnosis: Neck / shoulder pain s/p discectomy with fusion    Onset Date: 23  PT Insurance Information: [facility]  Total # of Visits Approved: 18 Per Physician Order  Total # of Visits to Date: 8  No Show: 0  Canceled Appointment: 0  Plan of Care/Certification Expiration Date: 24    Pre-Treatment Pain:  3-4/10     Assessment  Assessment: Pain 3-4/10 upper arm , she notes it is from ex earlier this am. Performed ex as outlined for strength and flexibility. Cues for scap retraction. Will cont. Plan  Continue with current plan of care    Exercises/Modalities/Manual:  See DocFlow Sheet    Education: see assessment          Goals  (Total # of Visits to Date: 8)   Short Term Goals  Time Frame for Short Term Goals: 9 visits  Short Term Goal 1: Pt to be independent and compliant with HEP for scapular strengthening/stabilization-met  Short Term Goal 2: Pt to report worst pain 3/10 x 4 consecutive days to improve ADL devan. Long Term Goals  Time Frame for Long Term Goals : 18 visits (POC EXP 23=4)  Long Term Goal 1: Pt to improve NDI from 10/45(due to sx precautions) to <5/45 to improve ADLs  Long Term Goal 2: Pt to have 4/5 horiz ABD strength with MMT to improve pt posture and reduce R shoulder discomfort. Long Term Goal 3: Pt to report no pain greater than 1/10 x1 wk to improve ADL Devan.   Long Term Goal 4: Pt to complete 3# shelf reach 3rd

## 2023-12-15 ENCOUNTER — HOSPITAL ENCOUNTER (OUTPATIENT)
Dept: PHYSICAL THERAPY | Age: 68
Setting detail: THERAPIES SERIES
Discharge: HOME | End: 2023-12-15
Payer: MEDICARE

## 2023-12-15 PROCEDURE — 97110 THERAPEUTIC EXERCISES: CPT

## 2023-12-15 NOTE — PROGRESS NOTES
[facility]      Fax: [phone]                            Outpatient Physical Therapy                                                                            Daily Note    Date: 12/15/2023  Patient Name: Kaden Hernandez        MRN: [id number]   ACCT#:  [de-identified]  : 1955  (77 y.o.)    Referring Provider (secondary): Drake RUELAS-CNP / Dr. Eunice Dejesus         Diagnosis: Cervical Spinal Stenosis - Anterior cervical Discetomy and decompression C4-5, C5-C6, C6-C7 (anterior plate fixation R6-1 on 23  Treatment Diagnosis: Neck / shoulder pain s/p discectomy with fusion    Onset Date: 23  PT Insurance Information: [facility]  Total # of Visits Approved: 18 Per Physician Order  Total # of Visits to Date: 9  No Show: 0  Canceled Appointment: 0  Plan of Care/Certification Expiration Date: 24    Pre-Treatment Pain:  0/10     Assessment  Assessment: Pt reports that when she takes 800mg ibuprofin the R arm discomfort completely resolves. Pt educated that per physician she may D/C her soft collar and progress wt and activity per chaparro. Good chaparro to progression this date. Added serratus punches in supine due to scapular winging and continued R shoulder/levator dicomfort. D/C soft tissue mobs to cervical, only completed crossfriction to Levator. Post session pt reports no R Shoulder pain with donning coat. .    Plan  Continue with current plan of care. Exercises/Modalities/Manual:  See DocFlow Sheet    Education: see assessment  Levator tightness, scapular mobility    Goals  (Total # of Visits to Date: 5)   Short Term Goals  Time Frame for Short Term Goals: 9 visits  Short Term Goal 1: Pt to be independent and compliant with HEP for scapular strengthening/stabilization-met  Short Term Goal 2: Pt to report worst pain 3/10 x 4 consecutive days to improve ADL chaparro. -NOT MET    Long Term Goals  Time Frame for Long Term Goals : 18 visits (POC EXP 24)  Long

## 2023-12-15 NOTE — OP NOTE
[facility]          Phone: [phone]      Outpatient Physical Therapy  Fax: [phone]                                 10th Visit Report    Date: 12/15/2023  ACCT #: [de-identified]      Referring Provider (secondary): Abe RUELAS-ANEESH / Dr. Dina Aguila         Diagnosis: Cervical Spinal Stenosis - Anterior cervical Discetomy and decompression C4-5, C5-C6, C6-C7 (anterior plate fixation G3-6 on 11/1/23      Treatment Diagnosis: Neck / shoulder pain s/p discectomy with fusion    Authorization Period: Start: 11/27/2023 End:  12/15/2023  Onset Date: 11/01/23  PT Insurance Information: [employer]  Total # of Visits Approved: 18 Per Physician Order  Total # of Visits to Date: 9  No Show: 0  Canceled Appointment: 0    Current Treatment:  Patient Education/HEP, Therapeutic Exercise, and Manual Therapy: Myofacial Release/Cupping    Skilled Intervention:  Body Structures, Functions, Activity Limitations Requiring Skilled Therapeutic Intervention: Decreased functional mobility , Decreased ADL status, Decreased ROM, Decreased strength, Decreased endurance, Decreased high-level IADLs, Increased pain, Decreased posture  Assessment: Pt reports that when she takes 800mg ibuprofin the R arm discomfort completely resolves. Pt educated that per physician she may D/C her soft collar and progress wt and activity per chaparro. Good chaparro to progression this date. Added serratus punches in supine due to scapular winging and continued R shoulder/levator dicomfort. D/C soft tissue mobs to cervical, only completed crossfriction to Levator. Post session pt reports no R Shoulder pain with donning coat. .  Therapy Prognosis: Good  Complete Daily Tasks Safely and Return to Prior Level of Function    Expectations for Improvement/Time Frame:  cont x3 wks toward LTGs    Plan  Continue with current plan of care    Goals  Short Term Goals  Time Frame for Short Term Goals: 9 visits  Short Term

## 2023-12-26 ENCOUNTER — HOSPITAL ENCOUNTER (OUTPATIENT)
Dept: PHYSICAL THERAPY | Age: 68
Setting detail: THERAPIES SERIES
Discharge: HOME | End: 2023-12-26
Payer: MEDICARE

## 2023-12-26 PROCEDURE — 97110 THERAPEUTIC EXERCISES: CPT

## 2023-12-26 NOTE — PROGRESS NOTES
Phone: [phone] [facility]      Fax: [phone]                            Outpatient Physical Therapy                                                                            Daily Note    Date: 2023  Patient Name: Chong Kaminski        MRN: [id number]   ACCT#:  [de-identified]  : 1955  (77 y.o.)    Referring Provider (secondary): Chidi RUELAS-CNP / Dr. Wright Shows         Diagnosis: Cervical Spinal Stenosis - Anterior cervical Discetomy and decompression C4-5, C5-C6, C6-C7 (anterior plate fixation H4-7 on 23  Treatment Diagnosis: Neck / shoulder pain s/p discectomy with fusion    Onset Date: 23  PT Insurance Information: [facility]  Total # of Visits Approved: 18 Per Physician Order  Total # of Visits to Date: 12  No Show: 0  Canceled Appointment: 0  Plan of Care/Certification Expiration Date: 24    Pre-Treatment Pain:  0/10     Assessment  Assessment: Pt with no c/o of pain this date, just \"aware\" of R levator. Pt reports good chaparro to increased resistance of ther ex this date. Pt pleased with progression. Pt plans to return to the gym tomorrow. Pt Levator tension improving and less tension noted this session vs the last this therapist treated pt. Will cont with remaining visits or until pt feels comfortable with gym transition for HEP. Plan  Continue with current plan of care    Exercises/Modalities/Manual:  See DocFlow Sheet    Education: progression of resistance          Goals  (Total # of Visits to Date: 15)   Short Term Goals  Time Frame for Short Term Goals: 9 visits  Short Term Goal 1: Pt to be independent and compliant with HEP for scapular strengthening/stabilization-met  Short Term Goal 2: Pt to report worst pain 3/10 x 4 consecutive days to improve ADL chaparro. -NOT MET    Long Term Goals  Time Frame for Long Term Goals : 18 visits (POC EXP 24)  Long Term Goal 1: Pt to improve NDI from 10/45(due to sx precautions) to <5/45 to improve

## 2023-12-28 ENCOUNTER — HOSPITAL ENCOUNTER (OUTPATIENT)
Dept: PHYSICAL THERAPY | Age: 68
Setting detail: THERAPIES SERIES
Discharge: HOME | End: 2023-12-28
Payer: MEDICARE

## 2023-12-28 PROCEDURE — 97110 THERAPEUTIC EXERCISES: CPT

## 2023-12-28 NOTE — PROGRESS NOTES
strength with MMT to improve pt posture and reduce R shoulder discomfort. Long Term Goal 3: Pt to report no pain greater than 1/10 x1 wk to improve ADL Devan. Long Term Goal 4: Pt to complete 3# shelf reach 3rd to 4th shelf x10 to improve household ADL  Long Term Goal 5: Pt to demonstrate proper crate lift 16# floor to waist x10 to improve household activities such as laundry. -MET    Post Treatment Pain:  0/10    Time In: 5434  Time Out: 1030  Timed Code Treatment Minutes: 40 Minutes  Total Treatment Time: [address], PT     Date: 12/28/2023

## 2023-12-29 ENCOUNTER — HOSPITAL ENCOUNTER (OUTPATIENT)
Dept: PHYSICAL THERAPY | Age: 68
Setting detail: THERAPIES SERIES
Discharge: HOME | End: 2023-12-29
Payer: MEDICARE

## 2023-12-29 PROCEDURE — 97110 THERAPEUTIC EXERCISES: CPT

## 2023-12-29 NOTE — PROGRESS NOTES
Phone: [address] [facility]      Fax: [phone]                            Outpatient Physical Therapy                                                                            Daily Note    Date: 2023  Patient Name: Ransom Romberg        MRN: [id number]   ACCT#:  [de-identified]  : 1955  (77 y.o.)    Referring Provider (secondary): Loye Kawasaki APRN-CNP / Dr. Etienne Christian         Diagnosis: Cervical Spinal Stenosis - Anterior cervical Discetomy and decompression C4-5, C5-C6, C6-C7 (anterior plate fixation R6-9 on 23  Treatment Diagnosis: Neck / shoulder pain s/p discectomy with fusion    Onset Date: 23  PT Insurance Information: [facility]  Total # of Visits Approved: 18 Per Physician Order  Total # of Visits to Date: 14  No Show: 0  Canceled Appointment: 0  Plan of Care/Certification Expiration Date: 24    Pre-Treatment Pain:  0/10     Assessment  Assessment: Pt reports she completed a machine like the hoist this AM with good chaparro, did not duplicate these ex's at PT. Pt with improved tissue mobility of levator. Plan to cont x3 visits then d/c to HEP/Gym. Plan  Continue with current plan of care    Exercises/Modalities/Manual:  See DocFlow Sheet    Education: Cont x3 visits then d/c to HEP      Goals  (Total # of Visits to Date: 15)   Short Term Goals  Time Frame for Short Term Goals: 9 visits  Short Term Goal 1: Pt to be independent and compliant with HEP for scapular strengthening/stabilization-met  Short Term Goal 2: Pt to report worst pain 3/10 x 4 consecutive days to improve ADL chaparro. -NOT MET    Long Term Goals  Time Frame for Long Term Goals : 18 visits (POC EXP 24)  Long Term Goal 1: Pt to improve NDI from 10/45(due to sx precautions) to <5/45 to improve ADLs  Long Term Goal 2: Pt to have 4/5 horiz ABD strength with MMT to improve pt posture and reduce R shoulder discomfort.   Long Term Goal 3: Pt to report no pain greater than 1/10 x1 wk to improve

## 2024-01-02 ENCOUNTER — HOSPITAL ENCOUNTER (OUTPATIENT)
Dept: PHYSICAL THERAPY | Age: 69
Setting detail: THERAPIES SERIES
Discharge: HOME | End: 2024-01-02
Payer: MEDICARE

## 2024-01-02 PROCEDURE — 97110 THERAPEUTIC EXERCISES: CPT

## 2024-01-02 NOTE — PROGRESS NOTES
Phone: [phone]                 [facility]      Fax: [phone]                            Outpatient Physical Therapy                                                                            Daily Note    Date: 2024  Patient Name: Dai Sanchez        MRN: [id number]   ACCT#:  [id number]  : 1955  (68 y.o.)    Referring Provider (secondary): Rhea RUELAS-ANEESH / Dr. Zuniga         Diagnosis: Cervical Spinal Stenosis - Anterior cervical Discetomy and decompression C4-5, C5-C6, C6-C7 (anterior plate fixation C4-7 on 23  Treatment Diagnosis: Neck / shoulder pain s/p discectomy with fusion    Onset Date: 23  PT Insurance Information: [facility]  Total # of Visits Approved: 18 Per Physician Order  Total # of Visits to Date: 15  No Show: 0  Canceled Appointment: 0  Plan of Care/Certification Expiration Date: 24    Pre-Treatment Pain:  0/10     Assessment  Assessment: Pt reports ex at the gym is going well. She wears her cervical collar for a few ex's. She sleeps with the collar on. Primary c/o stiffiness. Progressed serratus punches to 5#. Held overhead row d/t pt notes it causes discomfort. Anticipate DC at end of week.    Plan  Continue with current plan of care    Exercises/Modalities/Manual:  See DocFlow Sheet    Education: holding overhead row          Goals  (Total # of Visits to Date: 15)   Short Term Goals  Time Frame for Short Term Goals: 9 visits  Short Term Goal 1: Pt to be independent and compliant with HEP for scapular strengthening/stabilization-met  Short Term Goal 2: Pt to report worst pain 3/10 x 4 consecutive days to improve ADL chaparro.-NOT MET    Long Term Goals  Time Frame for Long Term Goals : 18 visits (POC EXP 24)  Long Term Goal 1: Pt to improve NDI from 10/45(due to sx precautions) to <5/45 to improve ADLs  Long Term Goal 2: Pt to have 4/5 horiz ABD strength with MMT to improve pt posture and reduce R shoulder discomfort.  Long Term Goal 3: Pt to

## 2024-01-04 ENCOUNTER — HOSPITAL ENCOUNTER (OUTPATIENT)
Dept: PHYSICAL THERAPY | Age: 69
Setting detail: THERAPIES SERIES
Discharge: HOME | End: 2024-01-04
Payer: MEDICARE

## 2024-01-04 PROCEDURE — 97110 THERAPEUTIC EXERCISES: CPT

## 2024-01-04 NOTE — PROGRESS NOTES
Phone: [phone]                 [facility]      Fax: [phone]                            Outpatient Physical Therapy                                                                            Daily Note    Date: 2024  Patient Name: Dai Sanchez        MRN: [id number]   ACCT#:  [id number]  : 1955  (68 y.o.)    Referring Provider (secondary): Rhea RUELAS-ANEESH / Dr. Zuniga         Diagnosis: Cervical Spinal Stenosis - Anterior cervical Discetomy and decompression C4-5, C5-C6, C6-C7 (anterior plate fixation C4-7 on 23  Treatment Diagnosis: Neck / shoulder pain s/p discectomy with fusion    Onset Date: 23  PT Insurance Information: [facility]  Total # of Visits Approved: 18 Per Physician Order  Total # of Visits to Date: 16  No Show: 0  Canceled Appointment: 0  Plan of Care/Certification Expiration Date: 24    Pre-Treatment Pain:  0/10     Assessment  Assessment: Pt requests DC to home ex. She is consistently going to the gym with no difficulty. Denies pain. NDI 0/45. Performed ex as outlined with good form. DC at this time.    Plan  Discharge    Exercises/Modalities/Manual:  See DocFlow Sheet    Education: cont with hep          Goals  (Total # of Visits to Date: 16)   Short Term Goals  Time Frame for Short Term Goals: 9 visits  Short Term Goal 1: Pt to be independent and compliant with HEP for scapular strengthening/stabilization-met  Short Term Goal 2: Pt to report worst pain 3/10 x 4 consecutive days to improve ADL devan.-NOT MET    Long Term Goals  Time Frame for Long Term Goals : 18 visits (POC EXP 24)  Long Term Goal 1: Pt to improve NDI from 10/45(due to sx precautions) to <5/45 to improve ADLs-met  Long Term Goal 2: Pt to have 4/5 horiz ABD strength with MMT to improve pt posture and reduce R shoulder discomfort.-met  Long Term Goal 3: Pt to report no pain greater than 1/10 x1 wk to improve ADL Devan.-met  Long Term Goal 4: Pt to complete 3# shelf reach 3rd to

## 2024-01-04 NOTE — DISCHARGE SUMMARY
Phone: [phone]                 [facility]             Fax: [phone]                            Outpatient Physical Therapy                                                                    Discharge Summary    Patient: Dai Sanchez  : 1955  ACCT #: [id number]   Referring Provider (secondary): Rhea BURGESS / Dr. Zuniga      Diagnosis: Cervical Spinal Stenosis - Anterior cervical Discetomy and decompression C4-5, C5-C6, C6-C7 (anterior plate fixation C4-7 on 23    Date Treatment Initiated: 23  Date of Last Treatment: 24    PT Visit Information  Onset Date: 23  PT Insurance Information: [facility]  Total # of Visits Approved: 18  Total # of Visits to Date: 16  Plan of Care/Certification Expiration Date: 24  No Show: 0  Progress Note Due Date: 02/15/23  Canceled Appointment: 0  Referring Provider (secondary): Rhea BURGESS / Dr. Zuniga    Frequency/Duration  Days: 3 times per week  Weeks: 6 weeks    Treatment Received  Patient Education/HEP, Back Education, Therapeutic Exercise, Manual Therapy: Myofacial Release/Cupping, and Manual Therapy: Mobilization/Manipulation    Assessment  AROM cervical mobility WNL. Pt requests DC to home ex. She is consistently going to the gym with no difficulty. Denies pain. NDI 0/45.   DC at this time.    Goals  Short Term Goals  Time Frame for Short Term Goals: 9 visits  Short Term Goal 1: Pt to be independent and compliant with HEP for scapular strengthening/stabilization-met  Short Term Goal 2: Pt to report worst pain 3/10 x 4 consecutive days to improve ADL chaparro.-NOT MET    Long Term Goals  Time Frame for Long Term Goals : 18 visits (POC EXP 24)  Long Term Goal 1: Pt to improve NDI from 10/45(due to sx precautions) to <5/45 to improve ADLs-met  Long Term Goal 2: Pt to have 4/5 horiz ABD strength with MMT to improve pt posture and reduce R shoulder discomfort.-met  Long Term Goal 3: Pt to report no pain

## 2024-01-05 ENCOUNTER — APPOINTMENT (OUTPATIENT)
Dept: PHYSICAL THERAPY | Age: 69
End: 2024-01-05
Payer: MEDICARE

## 2024-01-12 ENCOUNTER — HOSPITAL ENCOUNTER (OUTPATIENT)
Age: 69
Setting detail: SPECIMEN
Discharge: HOME | End: 2024-01-12
Payer: MEDICARE

## 2024-01-12 ENCOUNTER — OFFICE VISIT (OUTPATIENT)
Dept: PRIMARY CARE CLINIC | Age: 69
End: 2024-01-12
Payer: MEDICARE

## 2024-01-12 VITALS
HEART RATE: 64 BPM | BODY MASS INDEX: 26.06 KG/M2 | HEIGHT: 67 IN | OXYGEN SATURATION: 97 % | SYSTOLIC BLOOD PRESSURE: 138 MMHG | DIASTOLIC BLOOD PRESSURE: 86 MMHG | WEIGHT: 166 LBS

## 2024-01-12 DIAGNOSIS — M15.9 PRIMARY OSTEOARTHRITIS INVOLVING MULTIPLE JOINTS: ICD-10-CM

## 2024-01-12 DIAGNOSIS — R30.0 DYSURIA: ICD-10-CM

## 2024-01-12 DIAGNOSIS — Z87.2 HX OF DISCOID LUPUS ERYTHEMATOSUS: ICD-10-CM

## 2024-01-12 DIAGNOSIS — N39.41 URGE INCONTINENCE OF URINE: ICD-10-CM

## 2024-01-12 DIAGNOSIS — F41.9 ANXIETY: Primary | ICD-10-CM

## 2024-01-12 DIAGNOSIS — N30.01 ACUTE CYSTITIS WITH HEMATURIA: ICD-10-CM

## 2024-01-12 DIAGNOSIS — M19.031 PRIMARY OSTEOARTHRITIS OF BOTH WRISTS: ICD-10-CM

## 2024-01-12 DIAGNOSIS — M19.032 PRIMARY OSTEOARTHRITIS OF BOTH WRISTS: ICD-10-CM

## 2024-01-12 DIAGNOSIS — I10 ESSENTIAL HYPERTENSION: ICD-10-CM

## 2024-01-12 LAB
BILIRUBIN, POC: NEGATIVE
BLOOD URINE, POC: ABNORMAL
CLARITY, POC: ABNORMAL
COLOR, POC: YELLOW
GLUCOSE URINE, POC: NEGATIVE
KETONES, POC: NEGATIVE
LEUKOCYTE EST, POC: ABNORMAL
NITRITE, POC: POSITIVE
PH, POC: 6.5
PROTEIN, POC: NEGATIVE
SPECIFIC GRAVITY, POC: 1.03
UROBILINOGEN, POC: 0.2

## 2024-01-12 PROCEDURE — 87086 URINE CULTURE/COLONY COUNT: CPT

## 2024-01-12 PROCEDURE — 81002 URINALYSIS NONAUTO W/O SCOPE: CPT | Performed by: NURSE PRACTITIONER

## 2024-01-12 RX ORDER — IBUPROFEN 800 MG/1
800 TABLET ORAL EVERY 12 HOURS PRN
Qty: 180 TABLET | Refills: 0 | Status: CANCELLED | OUTPATIENT
Start: 2024-01-12

## 2024-01-12 RX ORDER — NITROFURANTOIN 25; 75 MG/1; MG/1
100 CAPSULE ORAL 2 TIMES DAILY
Qty: 14 CAPSULE | Refills: 0 | Status: SHIPPED | OUTPATIENT
Start: 2024-01-12 | End: 2024-01-19

## 2024-01-12 RX ORDER — CLORAZEPATE DIPOTASSIUM 7.5 MG/1
7.5 TABLET ORAL NIGHTLY
Qty: 90 TABLET | Refills: 0 | Status: SHIPPED | OUTPATIENT
Start: 2024-01-12 | End: 2024-04-11

## 2024-01-12 RX ORDER — MELOXICAM 15 MG/1
15 TABLET ORAL DAILY PRN
Qty: 30 TABLET | Refills: 0 | Status: SHIPPED | OUTPATIENT
Start: 2024-01-12

## 2024-01-12 ASSESSMENT — PATIENT HEALTH QUESTIONNAIRE - PHQ9
SUM OF ALL RESPONSES TO PHQ QUESTIONS 1-9: 0
SUM OF ALL RESPONSES TO PHQ9 QUESTIONS 1 & 2: 0
SUM OF ALL RESPONSES TO PHQ QUESTIONS 1-9: 0
1. LITTLE INTEREST OR PLEASURE IN DOING THINGS: 0
2. FEELING DOWN, DEPRESSED OR HOPELESS: 0

## 2024-01-12 NOTE — PATIENT INSTRUCTIONS
Phone: [phone]  Fax: [phone]    [place] Office Hours:  Monday: [place] office location 8-5 ([phone]) Offering additional late hours the first Monday of the month until 7 pm.   Tuesday: 8-5 Wednesday: 8-5 Thursday:  Additional hours offered 2 Thursdays a month. Please call to inquire those dates.   Fridays: 7:30-4:30   SURVEY:    You may be receiving a survey from [facility] regarding your visit today.    Please complete the survey to enable us to provide the highest quality of care to you and your family.    If you cannot score us a very good on any question, please call the office to discuss how we could have made your experience a very good one.    Thank you.

## 2024-01-12 NOTE — PROGRESS NOTES
[facility] [place]  [address] [place] [address]  Dept: [phone]  Dept Fax: [phone]    Last encounter 10/11/2023    Anxiety (3 month med check- Clorazepate. Pt states medication is working well for her) and Medication Problem (Pt states the oxybutynin is not working for her)       HPI:   Dai Sanchez is a 68 y.o. female who presentstoday for her medical conditions/complaints as noted below.  Dai Sanchez is c/o of Anxiety (3 month med check- Clorazepate. Pt states medication is working well for her) and Medication Problem (Pt states the oxybutynin is not working for her)      HPI  Established patient      Recent labs reviewed.    68 year old female with Lupus, osteoarthritis, anxiety, recent change in urinary incontinence worsening , ditropan no longer working.     Hyperlipidemia on statin     HTN on lisinopril, amlodipine.     Anxiety - no depression on clorazepate.   OARRS report ran.   Urinary incontinence- dribbling, leaking sometimes , double pad   Would     Reviewed prior notes Orthopedics  Reviewed previous Labs, Imaging, and Hospital Records    Past Medical History:   Diagnosis Date    Arthritis     follow with Dr. Stovall    Cancer (HCC)     melanoma on chest    Cervical spondylosis 2023    DDD (degenerative disc disease), cervical 2023    C4-5,C5-6, C6-7    Discoid lupus     Hay fever     Hormone disorder     Hypertension     Lipoma 2009    Retinal detachment with retinal defect of left eye 07/2023    Dr. Quinton Biswas       Past Surgical History:   Procedure Laterality Date    ANTERIOR CERVICAL DISCECTOMY W/ FUSION  11/01/2023    ant fusion C4-C7 with cage, ant cervical discectomy and decompression C4-C5, C5-C6 and C6-C7  Dr. Bryan Zuniga at [facility]    BACK SURGERY  1988    L-5, bethany @ [facility]    BLADDER SURGERY      COLONOSCOPY      COLONOSCOPY  12/16/2016    Dr. Quesada:  1 adenomatous polyp

## 2024-01-15 LAB
MICROORGANISM SPEC CULT: ABNORMAL
SPECIMEN DESCRIPTION: ABNORMAL

## 2024-01-23 ENCOUNTER — TELEPHONE (OUTPATIENT)
Dept: PRIMARY CARE CLINIC | Age: 69
End: 2024-01-23

## 2024-01-23 NOTE — TELEPHONE ENCOUNTER
Pt called in stating Myrbetriq is causing her to be constipated. She is asking if she can switch back to the med she used in the past called tolterodine (detrol). It was d/c originally because insurance wouldn't cover it, but pt was also unaware she could use Goodrx card. She's also requesting a dose increase from previous script of 2mg. If agreeable, please send tolterodine to [facility]

## 2024-01-24 NOTE — TELEPHONE ENCOUNTER
Pt has option to take lower dose of myrbetriq if it has worked well samples were 50 mg daily dose, does also come in 25, or I can switch bath to detrol at higher dose.     Let me know her thoughts. Cannot cut the myrbetriq pill in 1/2

## 2024-01-26 NOTE — TELEPHONE ENCOUNTER
Called Michelle and discussed options. She would like to got back to the [facility]. She said she knows that this works.

## 2024-01-29 RX ORDER — TOLTERODINE 4 MG/1
4 CAPSULE, EXTENDED RELEASE ORAL DAILY
Qty: 90 CAPSULE | Refills: 1 | Status: SHIPPED | OUTPATIENT
Start: 2024-01-29

## 2024-04-17 ENCOUNTER — OFFICE VISIT (OUTPATIENT)
Dept: PRIMARY CARE CLINIC | Age: 69
End: 2024-04-17

## 2024-04-17 ENCOUNTER — TELEPHONE (OUTPATIENT)
Dept: PRIMARY CARE CLINIC | Age: 69
End: 2024-04-17

## 2024-04-17 VITALS
OXYGEN SATURATION: 98 % | HEIGHT: 67 IN | DIASTOLIC BLOOD PRESSURE: 80 MMHG | WEIGHT: 168 LBS | SYSTOLIC BLOOD PRESSURE: 130 MMHG | HEART RATE: 72 BPM | BODY MASS INDEX: 26.37 KG/M2

## 2024-04-17 DIAGNOSIS — Z87.2 HX OF DISCOID LUPUS ERYTHEMATOSUS: ICD-10-CM

## 2024-04-17 DIAGNOSIS — E78.00 PURE HYPERCHOLESTEROLEMIA: ICD-10-CM

## 2024-04-17 DIAGNOSIS — F41.9 ANXIETY: ICD-10-CM

## 2024-04-17 DIAGNOSIS — F41.9 ANXIETY: Primary | ICD-10-CM

## 2024-04-17 RX ORDER — IBUPROFEN 800 MG/1
800 TABLET ORAL DAILY PRN
Qty: 90 TABLET | Refills: 0 | Status: SHIPPED | OUTPATIENT
Start: 2024-04-17

## 2024-04-17 RX ORDER — FUROSEMIDE 20 MG/1
20 TABLET ORAL
Qty: 20 TABLET | Refills: 0 | Status: SHIPPED | OUTPATIENT
Start: 2024-04-17

## 2024-04-17 RX ORDER — CLORAZEPATE DIPOTASSIUM 7.5 MG/1
7.5 TABLET ORAL NIGHTLY
Qty: 90 TABLET | Refills: 0 | Status: CANCELLED | OUTPATIENT
Start: 2024-04-17 | End: 2024-07-16

## 2024-04-17 RX ORDER — CLORAZEPATE DIPOTASSIUM 7.5 MG/1
7.5 TABLET ORAL NIGHTLY
Qty: 90 TABLET | Refills: 0 | Status: SHIPPED | OUTPATIENT
Start: 2024-04-17 | End: 2024-04-17 | Stop reason: SDUPTHER

## 2024-04-17 RX ORDER — CLORAZEPATE DIPOTASSIUM 7.5 MG/1
7.5 TABLET ORAL NIGHTLY
Qty: 90 TABLET | Refills: 0 | Status: SHIPPED | OUTPATIENT
Start: 2024-04-17 | End: 2024-07-16

## 2024-04-17 NOTE — PROGRESS NOTES
05/10/2023 06:16 PM    RDW 15.0 05/10/2023 06:16 PM     05/10/2023 06:16 PM     10/19/2011 07:37 AM    MPV 11.2 05/10/2023 06:16 PM     Lab Results   Component Value Date/Time    TSH 1.71 05/10/2023 06:16 PM     Lab Results   Component Value Date/Time    CHOL 188 11/28/2023 10:15 AM    HDL 80 11/28/2023 10:15 AM          Assessment & Plan       1. Anxiety    - clorazepate (TRANXENE) 7.5 MG tablet; Take 1 tablet by mouth nightly for 90 days. For anxiety  Dispense: 90 tablet; Refill: 0    2. Pure hypercholesterolemia  Continue statin    3. Hx of discoid lupus erythematosus  On plaquenil                  Completed Refills   Requested Prescriptions     Signed Prescriptions Disp Refills    ibuprofen (ADVIL;MOTRIN) 800 MG tablet 90 tablet 0     Sig: Take 1 tablet by mouth daily as needed for Pain (bilateral shoulder and ankle osteoarthritis pain)    clorazepate (TRANXENE) 7.5 MG tablet 90 tablet 0     Sig: Take 1 tablet by mouth nightly for 90 days. For anxiety    furosemide (LASIX) 20 MG tablet 20 tablet 0     Sig: Take 1 tablet by mouth every 14 days PRN if luz elena leg swelling or weight gain 3# overnight or 5 # in a week     No follow-ups on file.  Orders Placed This Encounter   Medications    ibuprofen (ADVIL;MOTRIN) 800 MG tablet     Sig: Take 1 tablet by mouth daily as needed for Pain (bilateral shoulder and ankle osteoarthritis pain)     Dispense:  90 tablet     Refill:  0    DISCONTD: clorazepate (TRANXENE) 7.5 MG tablet     Sig: Take 1 tablet by mouth nightly for 90 days. For anxiety     Dispense:  90 tablet     Refill:  0    clorazepate (TRANXENE) 7.5 MG tablet     Sig: Take 1 tablet by mouth nightly for 90 days. For anxiety     Dispense:  90 tablet     Refill:  0    furosemide (LASIX) 20 MG tablet     Sig: Take 1 tablet by mouth every 14 days PRN if luz elena leg swelling or weight gain 3# overnight or 5 # in a week     Dispense:  20 tablet     Refill:  0     No orders of the defined types were placed in

## 2024-04-17 NOTE — PATIENT INSTRUCTIONS
THANK YOU FOR TRUSTING US WITH YOUR HEALTHCARE !!    The associates caring for you today were:    Family Practice Provider: Kelly RUELAS-ANEESH    Clinical Team Nurse: Pippa Tobar LPN    Clinical Team Medical Assistant: Sheri Chavez MA    Our goal is to provide you with EXCEPTIONAL patient care, and we strive to do this for EVERY patient, EVERY visit. Since we care about you and your experience, you may receive a patient satisfaction survey from Ryann Wahl by postal mail/email/text. We truly welcome your feedback and ask that you complete the survey to let us know how we are doing.    Important Numbers:  [facility] phone [phone]   [place] Office Nurse/MA Line: [phone]  Fax  [phone]   Central [phone]  Billing questions: [phone]  Medical Records Request: [phone]    Manage your healthcare  with [facility]. To activate your account, visit [email or web address]   DIGITAL scheduling available now through my chart.  Schedule your next appointment at your convenience through your my chart.       [place] Office Hours:  Monday: [place] office location 8-5 ([phone]) Offering additional late hours the first Monday of the month until 7 pm.   Tuesday: 8: :  812 Noon, closed  of the month   : 7:30-4:30   SURVEY:    You may be receiving a survey from Ryann Wahl regarding your visit today.    Please complete the survey to enable us to provide the highest quality of care to you and your family.    If you cannot score us a very good on any question, please call the office to discuss how we could have made your experience a very good one.    Thank you.

## 2024-04-17 NOTE — TELEPHONE ENCOUNTER
This prescriptions was supposed to go to [facility] She was also asking about the lasix that you were going to order for her also going to [facility].

## 2024-05-16 RX ORDER — AMLODIPINE BESYLATE 10 MG/1
10 TABLET ORAL DAILY
Qty: 90 TABLET | Refills: 3 | Status: SHIPPED | OUTPATIENT
Start: 2024-05-16

## 2024-05-16 NOTE — TELEPHONE ENCOUNTER
Last OV: 4/17/2024  Last RX: 12/20/2023   Next scheduled apt: 7/19/2024      Surescripts requesting refill

## 2024-06-18 RX ORDER — IBUPROFEN 800 MG/1
800 TABLET ORAL DAILY PRN
Qty: 90 TABLET | Refills: 3 | Status: SHIPPED | OUTPATIENT
Start: 2024-06-18

## 2024-06-18 NOTE — TELEPHONE ENCOUNTER
Last OV: 4/17/2024  Last RX: 4/17/2024   Next scheduled apt: 7/19/2024      Surescripts requesting refill

## 2024-06-28 RX ORDER — ROSUVASTATIN CALCIUM 20 MG/1
20 TABLET, COATED ORAL DAILY
Qty: 90 TABLET | Refills: 3 | Status: SHIPPED | OUTPATIENT
Start: 2024-06-28

## 2024-06-28 NOTE — TELEPHONE ENCOUNTER
Last OV 4/17/24    Next OV 7/19/24    Requesting refill on rosuvastatin thru surescripts.  Rx pending

## 2024-07-17 DIAGNOSIS — I10 ESSENTIAL HYPERTENSION: ICD-10-CM

## 2024-07-18 RX ORDER — LISINOPRIL 5 MG/1
5 TABLET ORAL DAILY
Qty: 90 TABLET | Refills: 3 | Status: SHIPPED | OUTPATIENT
Start: 2024-07-18

## 2024-07-18 NOTE — TELEPHONE ENCOUNTER
Last OV: 4/17/2024  Last RX: 10/13/2024   Next scheduled apt: 7/19/2024    Surescripts requesting refill

## 2024-07-19 ENCOUNTER — HOSPITAL ENCOUNTER (OUTPATIENT)
Age: 69
Setting detail: SPECIMEN
Discharge: HOME | End: 2024-07-19
Payer: MEDICARE

## 2024-07-19 ENCOUNTER — OFFICE VISIT (OUTPATIENT)
Dept: PRIMARY CARE CLINIC | Age: 69
End: 2024-07-19

## 2024-07-19 VITALS
BODY MASS INDEX: 25.9 KG/M2 | WEIGHT: 165 LBS | SYSTOLIC BLOOD PRESSURE: 120 MMHG | DIASTOLIC BLOOD PRESSURE: 80 MMHG | OXYGEN SATURATION: 99 % | HEART RATE: 65 BPM | HEIGHT: 67 IN

## 2024-07-19 DIAGNOSIS — E55.9 VITAMIN D DEFICIENCY: ICD-10-CM

## 2024-07-19 DIAGNOSIS — R32 URINARY INCONTINENCE, UNSPECIFIED TYPE: ICD-10-CM

## 2024-07-19 DIAGNOSIS — Z00.00 MEDICARE ANNUAL WELLNESS VISIT, SUBSEQUENT: Primary | ICD-10-CM

## 2024-07-19 DIAGNOSIS — Z86.010 HX OF COLONIC POLYP: ICD-10-CM

## 2024-07-19 DIAGNOSIS — Z13.29 SCREENING FOR THYROID DISORDER: ICD-10-CM

## 2024-07-19 DIAGNOSIS — Z13.0 SCREENING FOR IRON DEFICIENCY ANEMIA: ICD-10-CM

## 2024-07-19 DIAGNOSIS — I10 ESSENTIAL HYPERTENSION: ICD-10-CM

## 2024-07-19 DIAGNOSIS — E78.00 PURE HYPERCHOLESTEROLEMIA: ICD-10-CM

## 2024-07-19 DIAGNOSIS — Z12.31 SCREENING MAMMOGRAM, ENCOUNTER FOR: ICD-10-CM

## 2024-07-19 DIAGNOSIS — F41.9 ANXIETY: ICD-10-CM

## 2024-07-19 DIAGNOSIS — Z12.11 ENCOUNTER FOR SCREENING COLONOSCOPY: ICD-10-CM

## 2024-07-19 LAB
ALBUMIN SERPL-MCNC: 4.2 G/DL (ref 3.5–5.2)
ALBUMIN/GLOB SERPL: 1.6 {RATIO} (ref 1–2.5)
ALP SERPL-CCNC: 64 U/L (ref 35–104)
ALT SERPL-CCNC: 21 U/L (ref 5–33)
ANION GAP SERPL CALCULATED.3IONS-SCNC: 11 MMOL/L (ref 9–17)
AST SERPL-CCNC: 41 U/L
BASOPHILS # BLD: 0.06 K/UL (ref 0–0.2)
BASOPHILS NFR BLD: 1 % (ref 0–2)
BILIRUB SERPL-MCNC: 0.3 MG/DL (ref 0.3–1.2)
BUN SERPL-MCNC: 18 MG/DL (ref 8–23)
BUN/CREAT SERPL: 15 (ref 9–20)
CALCIUM SERPL-MCNC: 9.8 MG/DL (ref 8.6–10.4)
CHLORIDE SERPL-SCNC: 103 MMOL/L (ref 98–107)
CO2 SERPL-SCNC: 26 MMOL/L (ref 20–31)
CREAT SERPL-MCNC: 1.2 MG/DL (ref 0.5–0.9)
EOSINOPHIL # BLD: 0.05 K/UL (ref 0–0.44)
EOSINOPHILS RELATIVE PERCENT: 1 % (ref 1–4)
ERYTHROCYTE [DISTWIDTH] IN BLOOD BY AUTOMATED COUNT: 14.4 % (ref 11.8–14.4)
GFR, ESTIMATED: 49 ML/MIN/1.73M2
GLUCOSE SERPL-MCNC: 87 MG/DL (ref 70–99)
HCT VFR BLD AUTO: 41.3 % (ref 36.3–47.1)
HGB BLD-MCNC: 13.5 G/DL (ref 11.9–15.1)
IMM GRANULOCYTES # BLD AUTO: <0.03 K/UL (ref 0–0.3)
IMM GRANULOCYTES NFR BLD: 0 %
LYMPHOCYTES NFR BLD: 1.26 K/UL (ref 1.1–3.7)
LYMPHOCYTES RELATIVE PERCENT: 29 % (ref 24–43)
MCH RBC QN AUTO: 31 PG (ref 25.2–33.5)
MCHC RBC AUTO-ENTMCNC: 32.7 G/DL (ref 28.4–34.8)
MCV RBC AUTO: 94.7 FL (ref 82.6–102.9)
MONOCYTES NFR BLD: 0.45 K/UL (ref 0.1–1.2)
MONOCYTES NFR BLD: 11 % (ref 3–12)
NEUTROPHILS NFR BLD: 58 % (ref 36–65)
NEUTS SEG NFR BLD: 2.46 K/UL (ref 1.5–8.1)
NRBC BLD-RTO: 0 PER 100 WBC
PLATELET # BLD AUTO: 170 K/UL (ref 138–453)
PMV BLD AUTO: 11.9 FL (ref 8.1–13.5)
POTASSIUM SERPL-SCNC: 4.8 MMOL/L (ref 3.7–5.3)
PROT SERPL-MCNC: 6.9 G/DL (ref 6.4–8.3)
RBC # BLD AUTO: 4.36 M/UL (ref 3.95–5.11)
SODIUM SERPL-SCNC: 140 MMOL/L (ref 135–144)
TSH SERPL DL<=0.05 MIU/L-ACNC: 1.57 UIU/ML (ref 0.3–5)
WBC OTHER # BLD: 4.3 K/UL (ref 3.5–11.3)

## 2024-07-19 PROCEDURE — 80053 COMPREHEN METABOLIC PANEL: CPT

## 2024-07-19 PROCEDURE — 85025 COMPLETE CBC W/AUTO DIFF WBC: CPT

## 2024-07-19 PROCEDURE — 80061 LIPID PANEL: CPT

## 2024-07-19 PROCEDURE — 82306 VITAMIN D 25 HYDROXY: CPT

## 2024-07-19 PROCEDURE — 84443 ASSAY THYROID STIM HORMONE: CPT

## 2024-07-19 RX ORDER — TOLTERODINE 4 MG/1
4 CAPSULE, EXTENDED RELEASE ORAL DAILY
Qty: 90 CAPSULE | Refills: 3 | OUTPATIENT
Start: 2024-07-19

## 2024-07-19 RX ORDER — CLORAZEPATE DIPOTASSIUM 7.5 MG/1
7.5 TABLET ORAL DAILY
Qty: 90 TABLET | Refills: 0 | Status: SHIPPED | OUTPATIENT
Start: 2024-07-19 | End: 2024-10-17

## 2024-07-19 RX ORDER — CLORAZEPATE DIPOTASSIUM 7.5 MG/1
TABLET ORAL
Status: CANCELLED | OUTPATIENT
Start: 2024-07-19

## 2024-07-19 RX ORDER — TOLTERODINE 4 MG/1
4 CAPSULE, EXTENDED RELEASE ORAL DAILY
Qty: 90 CAPSULE | Refills: 1 | Status: SHIPPED | OUTPATIENT
Start: 2024-07-19

## 2024-07-19 RX ORDER — TOLTERODINE 4 MG/1
4 CAPSULE, EXTENDED RELEASE ORAL DAILY
Qty: 90 CAPSULE | Refills: 1 | Status: CANCELLED | OUTPATIENT
Start: 2024-07-19

## 2024-07-19 RX ORDER — CLORAZEPATE DIPOTASSIUM 7.5 MG/1
TABLET ORAL
COMMUNITY
Start: 2024-02-11 | End: 2024-07-19 | Stop reason: SDUPTHER

## 2024-07-19 SDOH — ECONOMIC STABILITY: FOOD INSECURITY: WITHIN THE PAST 12 MONTHS, YOU WORRIED THAT YOUR FOOD WOULD RUN OUT BEFORE YOU GOT MONEY TO BUY MORE.: NEVER TRUE

## 2024-07-19 SDOH — ECONOMIC STABILITY: FOOD INSECURITY: WITHIN THE PAST 12 MONTHS, THE FOOD YOU BOUGHT JUST DIDN'T LAST AND YOU DIDN'T HAVE MONEY TO GET MORE.: NEVER TRUE

## 2024-07-19 SDOH — ECONOMIC STABILITY: INCOME INSECURITY: HOW HARD IS IT FOR YOU TO PAY FOR THE VERY BASICS LIKE FOOD, HOUSING, MEDICAL CARE, AND HEATING?: NOT VERY HARD

## 2024-07-19 ASSESSMENT — PATIENT HEALTH QUESTIONNAIRE - PHQ9
SUM OF ALL RESPONSES TO PHQ QUESTIONS 1-9: 0
SUM OF ALL RESPONSES TO PHQ QUESTIONS 1-9: 0
2. FEELING DOWN, DEPRESSED OR HOPELESS: NOT AT ALL
SUM OF ALL RESPONSES TO PHQ QUESTIONS 1-9: 0
SUM OF ALL RESPONSES TO PHQ9 QUESTIONS 1 & 2: 0
SUM OF ALL RESPONSES TO PHQ QUESTIONS 1-9: 0
1. LITTLE INTEREST OR PLEASURE IN DOING THINGS: NOT AT ALL

## 2024-07-19 ASSESSMENT — LIFESTYLE VARIABLES
HOW MANY STANDARD DRINKS CONTAINING ALCOHOL DO YOU HAVE ON A TYPICAL DAY: 1 OR 2
HOW OFTEN DO YOU HAVE A DRINK CONTAINING ALCOHOL: 2-3 TIMES A WEEK

## 2024-07-19 NOTE — PROGRESS NOTES
without deformity,   Neck: supple and non-tender without mass, no thyromegaly or thyroid nodules, no cervical lymphadenopathy  Pulmonary/Chest: clear to auscultation bilaterally- no wheezes, rales or rhonchi, normal air movement, no respiratory distress  Cardiovascular: normal rate, regular rhythm, normal S1 and S2, no murmurs, rubs, clicks, or gallops, distal pulses intact, no carotid bruits  Abdomen: soft, non-tender, non-distended, normal bowel sounds, no masses or organomegaly  Extremities: no cyanosis, clubbing or edema  Musculoskeletal: normal range of motion, no joint swelling, deformity or tenderness  Neurologic:  gait, coordination and speech normal            Allergies   Allergen Reactions    Augmentin [Amoxicillin-Pot Clavulanate]      Gave Diarhhea       Prior to Visit Medications    Medication Sig Taking? Authorizing Provider   clorazepate (TRANXENE) 7.5 MG tablet Take 1 tablet by mouth daily for 90 days. For anxiety Yes Kelly Rodríguez APRN - CNP   tolterodine (DETROL LA) 4 MG extended release capsule Take 1 capsule by mouth daily Yes Kelly Rodríguez APRN - CNP   lisinopril (PRINIVIL;ZESTRIL) 5 MG tablet TAKE 1 TABLET BY MOUTH DAILY Yes Kelly Rodríguez APRN - CNP   rosuvastatin (CRESTOR) 20 MG tablet TAKE 1 TABLET BY MOUTH DAILY Yes Kelly Rodríguez APRN - CNP   amLODIPine (NORVASC) 10 MG tablet TAKE 1 TABLET BY MOUTH DAILY Yes Kelly Rodríguez APRN - CNP   aspirin 81 MG EC tablet Take 1 tablet by mouth daily Yes Rosemarie Armijo MD   hydroxychloroquine (PLAQUENIL) 200 MG tablet take 1 tablet by mouth once daily Yes Rosemarie Armijo MD   cetirizine (ZYRTEC) 10 MG tablet Take 1 tablet by mouth daily Yes Rosemarie Armijo MD   Glucosamine 500 MG CAPS Take 2,000 tablets by mouth daily Yes Rosemarie Armijo MD   Coenzyme Q10 (CO Q-10) 200 MG CAPS Take 1 capsule by mouth Yes Rosemarie Armijo MD   NONFORMULARY Indications: Womens 1 a day 50 plus. Yes Rosemarie Armijo MD   vitamin

## 2024-07-19 NOTE — PATIENT INSTRUCTIONS
THANK YOU FOR TRUSTING US WITH YOUR HEALTHCARE !!    The associates caring for you today were:    Family Practice Provider: Kelly RUELAS-ANEESH    Clinical Team Nurse: Pippa Tobar LPN    Clinical Team Medical Assistant: Sheri Chavez MA    Our goal is to provide you with EXCEPTIONAL patient care, and we strive to do this for EVERY patient, EVERY visit. Since we care about you and your experience, you may receive a patient satisfaction survey from Ryann Wahl by postal mail/email/text. We truly welcome your feedback and ask that you complete the survey to let us know how we are doing.    Important Numbers:  [facility] phone [phone]   [place] Office Nurse/MA Line: [phone]  Fax  [phone]   Central [phone]  Billing questions: [phone]  Medical Records Request: [phone]    Manage your healthcare  with [facility]. To activate your account, visit [email or web address]   DIGITAL scheduling available now through my chart.  Schedule your next appointment at your convenience through your my chart.       [place] Office Hours:  Monday: [place] office location 8-5 ([phone]) Offering additional late hours the first Monday of the month until 7 pm.   Tuesday: 8: :  8-12 Noon, closed  of the month   : 7:30-4:30   SURVEY:    You may be receiving a survey from Ryann Wahl regarding your visit today.    Please complete the survey to enable us to provide the highest quality of care to you and your family.    If you cannot score us a very good on any question, please call the office to discuss how we could have made your experience a very good one.    Thank you.        A Healthy Heart: Care Instructions  Overview     Coronary artery disease, also called heart disease, occurs when a substance called plaque builds up in the vessels that supply oxygen-rich

## 2024-07-20 LAB
25(OH)D3 SERPL-MCNC: 57.8 NG/ML (ref 30–100)
CHOLEST SERPL-MCNC: 160 MG/DL (ref 0–199)
CHOLESTEROL/HDL RATIO: 2
HDLC SERPL-MCNC: 76 MG/DL
LDLC SERPL CALC-MCNC: 70 MG/DL (ref 0–100)
TRIGL SERPL-MCNC: 66 MG/DL
VLDLC SERPL CALC-MCNC: 13 MG/DL

## 2024-07-26 ENCOUNTER — TELEPHONE (OUTPATIENT)
Dept: FAMILY MEDICINE CLINIC | Age: 69
End: 2024-07-26

## 2024-07-26 ENCOUNTER — HOSPITAL ENCOUNTER (OUTPATIENT)
Dept: MAMMOGRAPHY | Age: 69
End: 2024-07-26
Payer: MEDICARE

## 2024-07-26 DIAGNOSIS — Z12.31 SCREENING MAMMOGRAM, ENCOUNTER FOR: ICD-10-CM

## 2024-07-26 PROCEDURE — 77063 BREAST TOMOSYNTHESIS BI: CPT

## 2024-07-26 NOTE — TELEPHONE ENCOUNTER
SURGERY SCHEDULING FORM   [place] [address] [address] [place], [place]    Phone *[phone]   Surgical Scheduling Direct Line Phone *[phone] Fax *[phone]      Dai Sanchez 1955 female         Home Phone: [phone]      Cell Phone:    Telephone Information:   Mobile [phone]          Surgeon: GABRIELLA Quesada MD                 Surgery Date: 9/23             Time: 730a    Procedure: colonoscopy    Diagnosis: screening colonoscopy     Important Medical History:  In [facility]    Special Inst/Equip: Regular    CPT Codes:    03324  Latex Allergy: No     Cardiac Device:  No    Anesthesia:  General          Admission Type:  Same Day                        Admit Prior to Day of Surgery: No              Preadmission Testing:  Phone Call          PAT Date and Time:     Need Preop Cardiac Clearance: No    Does Patient have Cardiologist/physician?     no    : Cyn                         Date: 7/26    Insurance Company Name: [employer]

## 2024-08-21 ENCOUNTER — LAB (OUTPATIENT)
Dept: PRIMARY CARE CLINIC | Age: 69
End: 2024-08-21
Payer: MEDICARE

## 2024-08-21 ENCOUNTER — TELEPHONE (OUTPATIENT)
Dept: PRIMARY CARE CLINIC | Age: 69
End: 2024-08-21

## 2024-08-21 ENCOUNTER — HOSPITAL ENCOUNTER (OUTPATIENT)
Age: 69
Setting detail: SPECIMEN
Discharge: HOME | End: 2024-08-21
Payer: MEDICARE

## 2024-08-21 DIAGNOSIS — I10 ESSENTIAL HYPERTENSION: ICD-10-CM

## 2024-08-21 DIAGNOSIS — I10 ESSENTIAL HYPERTENSION: Primary | ICD-10-CM

## 2024-08-21 LAB
ANION GAP SERPL CALCULATED.3IONS-SCNC: 10 MMOL/L (ref 9–17)
BUN SERPL-MCNC: 19 MG/DL (ref 8–23)
BUN/CREAT SERPL: 17 (ref 9–20)
CALCIUM SERPL-MCNC: 9.9 MG/DL (ref 8.6–10.4)
CHLORIDE SERPL-SCNC: 105 MMOL/L (ref 98–107)
CO2 SERPL-SCNC: 27 MMOL/L (ref 20–31)
CREAT SERPL-MCNC: 1.1 MG/DL (ref 0.5–0.9)
GFR, ESTIMATED: 54 ML/MIN/1.73M2
GLUCOSE SERPL-MCNC: 86 MG/DL (ref 70–99)
POTASSIUM SERPL-SCNC: 4.2 MMOL/L (ref 3.7–5.3)
SODIUM SERPL-SCNC: 142 MMOL/L (ref 135–144)

## 2024-08-21 PROCEDURE — 36415 COLL VENOUS BLD VENIPUNCTURE: CPT | Performed by: NURSE PRACTITIONER

## 2024-08-21 PROCEDURE — 80048 BASIC METABOLIC PNL TOTAL CA: CPT

## 2024-08-21 NOTE — PROGRESS NOTES
Pt in office for labs. Venipuncture successful in Right antecubital space. Attempted 1 time(s). Pt tolerated well.      Pt had excessive bleeding at venipuncture site during venipuncture. Immediately removed needle at the sign of blood at site. Applied pressure until bleeding stopped. Applied gauze, bandage, and wrapped site with coban. Advised pt to leave coban in place for 15-30 minutes. Pt voiced understanding.

## 2024-08-21 NOTE — TELEPHONE ENCOUNTER
Pt had a couple questions regarding the most recent abnormal labs.     Pt states before her last blood draw, she drank a Celsius drink. Wanted to know if this could have skewed her results.     Pt also questions if tolterodine could cause the abnormality with her kidneys. Please advise.

## 2024-08-23 NOTE — TELEPHONE ENCOUNTER
Inform pt the tolterodine can cause the body to feel dryer or influence dehydration but usually our thirst reflex (dry mouth) will make most people drink enough water.     Lab Results   Component Value Date/Time     08/21/2024 11:19 AM    K 4.2 08/21/2024 11:19 AM     08/21/2024 11:19 AM    CO2 27 08/21/2024 11:19 AM    BUN 19 08/21/2024 11:19 AM    CREATININE 1.1 08/21/2024 11:19 AM    GLUCOSE 86 08/21/2024 11:19 AM    CALCIUM 9.9 08/21/2024 11:19 AM    LABGLOM 54 08/21/2024 11:19 AM    LABGLOM >60 11/28/2023 10:15 AM      With the BUN being within range of 18-23 she was not dehydrated with this blood draw.     The celsius energy drinks vary in caffeine content with the original ones contained 200 milligrams of caffeine per 8.4 ounce can. Current products range from 100-300 mg daily   A typical cup of coffee 8 ounces is 95 milligrams of caffeine. (Red bull has 80 mg )     Caffeine can cause potential effects such as shakiness, headaches, fast heart rate and anxiety . Avoid excessive caffeine intake it also acts as a natural diuretic.     Celcius has no added sugar but do have artificial sweeteners specifically sucralose     If using celcius as preworkout it can potentially improve performance a bit and also pain tolerance. Research on this drink studies are small . Most healthy adults celcius is okay in moderation.     I would say your kidney function has transitioned a little and that is normal to decrease around age 68 because about 20% of the original kidney filters typically don't work anymore with age related changes, diseases can also influence that.     Your average creatinine is 1.0 which is 1/10 pt higher than normal   GFR  is 54 which 60 is best. This is best view of true kidney function.     So be careful pay attention. Protect those kidneys by watching what meds and OTC products you use. Stay well hydrated.     No med changes advised   Kelly

## 2024-09-09 ENCOUNTER — HOSPITAL ENCOUNTER (OUTPATIENT)
Age: 69
Discharge: HOME | End: 2024-09-09
Payer: MEDICARE

## 2024-09-09 ENCOUNTER — OFFICE VISIT (OUTPATIENT)
Dept: FAMILY MEDICINE CLINIC | Age: 69
End: 2024-09-09
Payer: MEDICARE

## 2024-09-09 VITALS
SYSTOLIC BLOOD PRESSURE: 138 MMHG | DIASTOLIC BLOOD PRESSURE: 86 MMHG | HEIGHT: 67 IN | BODY MASS INDEX: 26.06 KG/M2 | HEART RATE: 60 BPM | WEIGHT: 166 LBS

## 2024-09-09 DIAGNOSIS — Z12.11 COLON CANCER SCREENING: Primary | ICD-10-CM

## 2024-09-09 DIAGNOSIS — Z01.818 PRE-OP TESTING: ICD-10-CM

## 2024-09-09 PROCEDURE — 3017F COLORECTAL CA SCREEN DOC REV: CPT | Performed by: INTERNAL MEDICINE

## 2024-09-09 PROCEDURE — G8427 DOCREV CUR MEDS BY ELIG CLIN: HCPCS | Performed by: INTERNAL MEDICINE

## 2024-09-09 PROCEDURE — 99213 OFFICE O/P EST LOW 20 MIN: CPT | Performed by: INTERNAL MEDICINE

## 2024-09-09 PROCEDURE — G8419 CALC BMI OUT NRM PARAM NOF/U: HCPCS | Performed by: INTERNAL MEDICINE

## 2024-09-09 PROCEDURE — 3075F SYST BP GE 130 - 139MM HG: CPT | Performed by: INTERNAL MEDICINE

## 2024-09-09 PROCEDURE — G8399 PT W/DXA RESULTS DOCUMENT: HCPCS | Performed by: INTERNAL MEDICINE

## 2024-09-09 PROCEDURE — 1036F TOBACCO NON-USER: CPT | Performed by: INTERNAL MEDICINE

## 2024-09-09 PROCEDURE — 1123F ACP DISCUSS/DSCN MKR DOCD: CPT | Performed by: INTERNAL MEDICINE

## 2024-09-09 PROCEDURE — 3079F DIAST BP 80-89 MM HG: CPT | Performed by: INTERNAL MEDICINE

## 2024-09-09 PROCEDURE — 1090F PRES/ABSN URINE INCON ASSESS: CPT | Performed by: INTERNAL MEDICINE

## 2024-09-09 PROCEDURE — 93005 ELECTROCARDIOGRAM TRACING: CPT

## 2024-09-09 RX ORDER — SODIUM, POTASSIUM,MAG SULFATES 17.5-3.13G
SOLUTION, RECONSTITUTED, ORAL ORAL
Qty: 1 EACH | Refills: 0 | Status: SHIPPED | OUTPATIENT
Start: 2024-09-09

## 2024-09-09 ASSESSMENT — ENCOUNTER SYMPTOMS
BLOOD IN STOOL: 0
SHORTNESS OF BREATH: 0
DIARRHEA: 0
CONSTIPATION: 0
SORE THROAT: 0
RHINORRHEA: 0
CHEST TIGHTNESS: 0
COUGH: 0
ABDOMINAL PAIN: 0
NAUSEA: 0

## 2024-09-10 LAB
EKG ATRIAL RATE: 49 BPM
EKG P AXIS: 50 DEGREES
EKG P-R INTERVAL: 170 MS
EKG Q-T INTERVAL: 428 MS
EKG QRS DURATION: 78 MS
EKG QTC CALCULATION (BAZETT): 386 MS
EKG R AXIS: 9 DEGREES
EKG T AXIS: 53 DEGREES
EKG VENTRICULAR RATE: 49 BPM

## 2024-09-10 PROCEDURE — 93010 ELECTROCARDIOGRAM REPORT: CPT | Performed by: INTERNAL MEDICINE

## 2024-09-20 ENCOUNTER — ANESTHESIA EVENT (OUTPATIENT)
Dept: ENDOSCOPY | Age: 69
End: 2024-09-20
Payer: MEDICARE

## 2024-09-20 ENCOUNTER — PREP FOR PROCEDURE (OUTPATIENT)
Dept: FAMILY MEDICINE CLINIC | Age: 69
End: 2024-09-20

## 2024-09-20 RX ORDER — SODIUM CHLORIDE 0.9 % (FLUSH) 0.9 %
5-40 SYRINGE (ML) INJECTION PRN
Status: CANCELLED | OUTPATIENT
Start: 2024-09-20

## 2024-09-20 RX ORDER — SODIUM CHLORIDE 9 MG/ML
25 INJECTION, SOLUTION INTRAVENOUS PRN
Status: CANCELLED | OUTPATIENT
Start: 2024-09-20

## 2024-09-20 RX ORDER — SODIUM CHLORIDE 0.9 % (FLUSH) 0.9 %
5-40 SYRINGE (ML) INJECTION EVERY 12 HOURS SCHEDULED
Status: CANCELLED | OUTPATIENT
Start: 2024-09-20

## 2024-09-20 RX ORDER — SODIUM CHLORIDE, SODIUM LACTATE, POTASSIUM CHLORIDE, CALCIUM CHLORIDE 600; 310; 30; 20 MG/100ML; MG/100ML; MG/100ML; MG/100ML
INJECTION, SOLUTION INTRAVENOUS CONTINUOUS
Status: CANCELLED | OUTPATIENT
Start: 2024-09-20

## 2024-09-23 ENCOUNTER — ANESTHESIA (OUTPATIENT)
Dept: ENDOSCOPY | Age: 69
End: 2024-09-23
Payer: MEDICARE

## 2024-09-23 ENCOUNTER — HOSPITAL ENCOUNTER (OUTPATIENT)
Age: 69
Setting detail: OUTPATIENT SURGERY
Discharge: HOME | End: 2024-09-23
Attending: INTERNAL MEDICINE | Admitting: INTERNAL MEDICINE
Payer: MEDICARE

## 2024-09-23 VITALS
BODY MASS INDEX: 26.35 KG/M2 | WEIGHT: 167.9 LBS | OXYGEN SATURATION: 96 % | HEART RATE: 54 BPM | SYSTOLIC BLOOD PRESSURE: 115 MMHG | DIASTOLIC BLOOD PRESSURE: 59 MMHG | RESPIRATION RATE: 15 BRPM | HEIGHT: 67 IN | TEMPERATURE: 96.8 F

## 2024-09-23 DIAGNOSIS — Z12.11 ENCOUNTER FOR SCREENING COLONOSCOPY: ICD-10-CM

## 2024-09-23 PROCEDURE — 3700000001 HC ADD 15 MINUTES (ANESTHESIA): Performed by: INTERNAL MEDICINE

## 2024-09-23 PROCEDURE — 2580000003 HC RX 258: Performed by: INTERNAL MEDICINE

## 2024-09-23 PROCEDURE — 6360000002 HC RX W HCPCS: Performed by: NURSE ANESTHETIST, CERTIFIED REGISTERED

## 2024-09-23 PROCEDURE — 3609010600 HC COLONOSCOPY POLYPECTOMY SNARE/COLD BIOPSY: Performed by: INTERNAL MEDICINE

## 2024-09-23 PROCEDURE — 7100000011 HC PHASE II RECOVERY - ADDTL 15 MIN: Performed by: INTERNAL MEDICINE

## 2024-09-23 PROCEDURE — 2500000003 HC RX 250 WO HCPCS: Performed by: NURSE ANESTHETIST, CERTIFIED REGISTERED

## 2024-09-23 PROCEDURE — 7100000010 HC PHASE II RECOVERY - FIRST 15 MIN: Performed by: INTERNAL MEDICINE

## 2024-09-23 PROCEDURE — 3700000000 HC ANESTHESIA ATTENDED CARE: Performed by: INTERNAL MEDICINE

## 2024-09-23 PROCEDURE — 88305 TISSUE EXAM BY PATHOLOGIST: CPT

## 2024-09-23 PROCEDURE — 2709999900 HC NON-CHARGEABLE SUPPLY: Performed by: INTERNAL MEDICINE

## 2024-09-23 RX ORDER — SODIUM CHLORIDE 0.9 % (FLUSH) 0.9 %
5-40 SYRINGE (ML) INJECTION PRN
Status: DISCONTINUED | OUTPATIENT
Start: 2024-09-23 | End: 2024-09-23 | Stop reason: HOSPADM

## 2024-09-23 RX ORDER — PROPOFOL 10 MG/ML
INJECTION, EMULSION INTRAVENOUS
Status: DISCONTINUED | OUTPATIENT
Start: 2024-09-23 | End: 2024-09-23 | Stop reason: SDUPTHER

## 2024-09-23 RX ORDER — SODIUM CHLORIDE, SODIUM LACTATE, POTASSIUM CHLORIDE, CALCIUM CHLORIDE 600; 310; 30; 20 MG/100ML; MG/100ML; MG/100ML; MG/100ML
INJECTION, SOLUTION INTRAVENOUS CONTINUOUS
Status: DISCONTINUED | OUTPATIENT
Start: 2024-09-23 | End: 2024-09-23 | Stop reason: HOSPADM

## 2024-09-23 RX ORDER — MIDAZOLAM HYDROCHLORIDE 1 MG/ML
INJECTION INTRAMUSCULAR; INTRAVENOUS
Status: DISCONTINUED | OUTPATIENT
Start: 2024-09-23 | End: 2024-09-23 | Stop reason: SDUPTHER

## 2024-09-23 RX ORDER — FENTANYL CITRATE 50 UG/ML
INJECTION, SOLUTION INTRAMUSCULAR; INTRAVENOUS
Status: DISCONTINUED | OUTPATIENT
Start: 2024-09-23 | End: 2024-09-23 | Stop reason: SDUPTHER

## 2024-09-23 RX ORDER — SODIUM CHLORIDE 9 MG/ML
25 INJECTION, SOLUTION INTRAVENOUS PRN
Status: DISCONTINUED | OUTPATIENT
Start: 2024-09-23 | End: 2024-09-23 | Stop reason: HOSPADM

## 2024-09-23 RX ORDER — LIDOCAINE HYDROCHLORIDE 10 MG/ML
INJECTION, SOLUTION EPIDURAL; INFILTRATION; INTRACAUDAL; PERINEURAL
Status: DISCONTINUED | OUTPATIENT
Start: 2024-09-23 | End: 2024-09-23 | Stop reason: SDUPTHER

## 2024-09-23 RX ORDER — SODIUM CHLORIDE 0.9 % (FLUSH) 0.9 %
5-40 SYRINGE (ML) INJECTION EVERY 12 HOURS SCHEDULED
Status: DISCONTINUED | OUTPATIENT
Start: 2024-09-23 | End: 2024-09-23 | Stop reason: HOSPADM

## 2024-09-23 RX ADMIN — PROPOFOL 100 MG: 10 INJECTION, EMULSION INTRAVENOUS at 07:11

## 2024-09-23 RX ADMIN — PROPOFOL 40 MG: 10 INJECTION, EMULSION INTRAVENOUS at 07:16

## 2024-09-23 RX ADMIN — PROPOFOL 20 MG: 10 INJECTION, EMULSION INTRAVENOUS at 07:18

## 2024-09-23 RX ADMIN — PROPOFOL 30 MG: 10 INJECTION, EMULSION INTRAVENOUS at 07:29

## 2024-09-23 RX ADMIN — SODIUM CHLORIDE, POTASSIUM CHLORIDE, SODIUM LACTATE AND CALCIUM CHLORIDE: 600; 310; 30; 20 INJECTION, SOLUTION INTRAVENOUS at 06:34

## 2024-09-23 RX ADMIN — FENTANYL CITRATE 50 MCG: 50 INJECTION, SOLUTION INTRAMUSCULAR; INTRAVENOUS at 07:07

## 2024-09-23 RX ADMIN — PROPOFOL 75 MCG/KG/MIN: 10 INJECTION, EMULSION INTRAVENOUS at 07:11

## 2024-09-23 RX ADMIN — FENTANYL CITRATE 50 MCG: 50 INJECTION, SOLUTION INTRAMUSCULAR; INTRAVENOUS at 07:11

## 2024-09-23 RX ADMIN — LIDOCAINE HYDROCHLORIDE 50 MG: 10 INJECTION, SOLUTION EPIDURAL; INFILTRATION; INTRACAUDAL; PERINEURAL at 07:11

## 2024-09-23 RX ADMIN — MIDAZOLAM 2 MG: 1 INJECTION INTRAMUSCULAR; INTRAVENOUS at 07:07

## 2024-09-23 ASSESSMENT — PAIN - FUNCTIONAL ASSESSMENT: PAIN_FUNCTIONAL_ASSESSMENT: 0-10

## 2024-09-25 LAB — SURGICAL PATHOLOGY REPORT: NORMAL

## 2024-10-01 ENCOUNTER — HOSPITAL ENCOUNTER (EMERGENCY)
Age: 69
Discharge: ANOTHER HOSPITAL | End: 2024-10-02
Attending: EMERGENCY MEDICINE
Payer: MEDICARE

## 2024-10-01 ENCOUNTER — APPOINTMENT (OUTPATIENT)
Dept: CT IMAGING | Age: 69
End: 2024-10-01
Payer: MEDICARE

## 2024-10-01 DIAGNOSIS — S36.029A SPLEEN HEMATOMA, INITIAL ENCOUNTER: Primary | ICD-10-CM

## 2024-10-01 DIAGNOSIS — R10.9 LEFT FLANK PAIN: ICD-10-CM

## 2024-10-01 DIAGNOSIS — K66.1 INTRAPERITONEAL BLEEDING: ICD-10-CM

## 2024-10-01 LAB
-: NORMAL
ALBUMIN SERPL-MCNC: 4.2 G/DL (ref 3.5–5.2)
ALP SERPL-CCNC: 57 U/L (ref 35–104)
ALT SERPL-CCNC: 16 U/L (ref 5–33)
ANION GAP SERPL CALCULATED.3IONS-SCNC: 10 MMOL/L (ref 9–17)
AST SERPL-CCNC: 30 U/L
BASOPHILS # BLD: 0.07 K/UL (ref 0–0.2)
BASOPHILS NFR BLD: 1 % (ref 0–2)
BILIRUB SERPL-MCNC: 0.4 MG/DL (ref 0.3–1.2)
BILIRUB UR QL STRIP: NEGATIVE
BUN SERPL-MCNC: 26 MG/DL (ref 8–23)
BUN/CREAT SERPL: 17 (ref 9–20)
CALCIUM SERPL-MCNC: 10.2 MG/DL (ref 8.6–10.4)
CHLORIDE SERPL-SCNC: 99 MMOL/L (ref 98–107)
CLARITY UR: CLEAR
CO2 SERPL-SCNC: 24 MMOL/L (ref 20–31)
COLOR UR: YELLOW
COMMENT: ABNORMAL
CREAT SERPL-MCNC: 1.5 MG/DL (ref 0.5–0.9)
D DIMER PPP FEU-MCNC: 0.84 UG/ML FEU (ref 0–0.59)
EOSINOPHIL # BLD: 0.01 K/UL (ref 0–0.4)
EOSINOPHILS RELATIVE PERCENT: 0 % (ref 0–5)
ERYTHROCYTE [DISTWIDTH] IN BLOOD BY AUTOMATED COUNT: 14.3 % (ref 12.1–15.2)
GFR, ESTIMATED: 37 ML/MIN/1.73M2
GLUCOSE SERPL-MCNC: 137 MG/DL (ref 70–99)
GLUCOSE UR STRIP-MCNC: NEGATIVE MG/DL
HCT VFR BLD AUTO: 35.2 % (ref 36–46)
HGB BLD-MCNC: 11.7 G/DL (ref 12–16)
HGB UR QL STRIP.AUTO: NEGATIVE
IMM GRANULOCYTES # BLD AUTO: 0.02 K/UL (ref 0–0.3)
IMM GRANULOCYTES NFR BLD: 0 % (ref 0–5)
INR PPP: 1
KETONES UR STRIP-MCNC: NEGATIVE MG/DL
LEUKOCYTE ESTERASE UR QL STRIP: ABNORMAL
LIPASE SERPL-CCNC: 41 U/L (ref 13–60)
LYMPHOCYTES NFR BLD: 1.38 K/UL (ref 1–4.8)
LYMPHOCYTES RELATIVE PERCENT: 14 % (ref 15–40)
MCH RBC QN AUTO: 31.1 PG (ref 26–34)
MCHC RBC AUTO-ENTMCNC: 33.2 G/DL (ref 31–37)
MCV RBC AUTO: 93.6 FL (ref 80–100)
MONOCYTES NFR BLD: 0.66 K/UL (ref 0–1)
MONOCYTES NFR BLD: 7 % (ref 4–8)
NEUTROPHILS NFR BLD: 78 % (ref 47–75)
NEUTS SEG NFR BLD: 7.65 K/UL (ref 2.5–7)
NITRITE UR QL STRIP: NEGATIVE
PH UR STRIP: 5 [PH] (ref 5–8)
PLATELET # BLD AUTO: 204 K/UL (ref 140–450)
PMV BLD AUTO: 10.8 FL (ref 6–12)
POTASSIUM SERPL-SCNC: 4 MMOL/L (ref 3.7–5.3)
PROT SERPL-MCNC: 6.8 G/DL (ref 6.4–8.3)
PROT UR STRIP-MCNC: ABNORMAL MG/DL
PROTHROMBIN TIME: 12.8 SEC (ref 11.5–14.2)
RBC # BLD AUTO: 3.76 M/UL (ref 4–5.2)
RBC #/AREA URNS HPF: NORMAL /HPF (ref 0–2)
SODIUM SERPL-SCNC: 133 MMOL/L (ref 135–144)
SP GR UR STRIP: 1.01 (ref 1–1.03)
UROBILINOGEN UR STRIP-ACNC: NORMAL EU/DL (ref 0–1)
WBC #/AREA URNS HPF: NORMAL /HPF
WBC OTHER # BLD: 9.8 K/UL (ref 3.5–11)

## 2024-10-01 PROCEDURE — 85025 COMPLETE CBC W/AUTO DIFF WBC: CPT

## 2024-10-01 PROCEDURE — 93005 ELECTROCARDIOGRAM TRACING: CPT | Performed by: EMERGENCY MEDICINE

## 2024-10-01 PROCEDURE — 74177 CT ABD & PELVIS W/CONTRAST: CPT

## 2024-10-01 PROCEDURE — 85379 FIBRIN DEGRADATION QUANT: CPT

## 2024-10-01 PROCEDURE — 83690 ASSAY OF LIPASE: CPT

## 2024-10-01 PROCEDURE — 80053 COMPREHEN METABOLIC PANEL: CPT

## 2024-10-01 PROCEDURE — 81001 URINALYSIS AUTO W/SCOPE: CPT

## 2024-10-01 PROCEDURE — 87086 URINE CULTURE/COLONY COUNT: CPT

## 2024-10-01 PROCEDURE — 6360000002 HC RX W HCPCS: Performed by: EMERGENCY MEDICINE

## 2024-10-01 PROCEDURE — 85610 PROTHROMBIN TIME: CPT

## 2024-10-01 PROCEDURE — 99285 EMERGENCY DEPT VISIT HI MDM: CPT

## 2024-10-01 PROCEDURE — 6360000004 HC RX CONTRAST MEDICATION: Performed by: EMERGENCY MEDICINE

## 2024-10-01 PROCEDURE — 96374 THER/PROPH/DIAG INJ IV PUSH: CPT

## 2024-10-01 PROCEDURE — 2580000003 HC RX 258: Performed by: EMERGENCY MEDICINE

## 2024-10-01 RX ORDER — KETOROLAC TROMETHAMINE 15 MG/ML
15 INJECTION, SOLUTION INTRAMUSCULAR; INTRAVENOUS ONCE
Status: COMPLETED | OUTPATIENT
Start: 2024-10-01 | End: 2024-10-01

## 2024-10-01 RX ORDER — IOPAMIDOL 755 MG/ML
75 INJECTION, SOLUTION INTRAVASCULAR
Status: COMPLETED | OUTPATIENT
Start: 2024-10-01 | End: 2024-10-01

## 2024-10-01 RX ORDER — 0.9 % SODIUM CHLORIDE 0.9 %
1000 INTRAVENOUS SOLUTION INTRAVENOUS ONCE
Status: COMPLETED | OUTPATIENT
Start: 2024-10-01 | End: 2024-10-01

## 2024-10-01 RX ADMIN — IOPAMIDOL 75 ML: 755 INJECTION, SOLUTION INTRAVENOUS at 18:21

## 2024-10-01 RX ADMIN — SODIUM CHLORIDE 1000 ML: 9 INJECTION, SOLUTION INTRAVENOUS at 17:44

## 2024-10-01 RX ADMIN — KETOROLAC TROMETHAMINE 15 MG: 15 INJECTION, SOLUTION INTRAMUSCULAR; INTRAVENOUS at 18:07

## 2024-10-01 ASSESSMENT — PAIN DESCRIPTION - LOCATION
LOCATION: ABDOMEN
LOCATION: ABDOMEN

## 2024-10-01 ASSESSMENT — PAIN SCALES - GENERAL
PAINLEVEL_OUTOF10: 10
PAINLEVEL_OUTOF10: 10
PAINLEVEL_OUTOF10: 0

## 2024-10-01 ASSESSMENT — PAIN - FUNCTIONAL ASSESSMENT
PAIN_FUNCTIONAL_ASSESSMENT: 0-10
PAIN_FUNCTIONAL_ASSESSMENT: ACTIVITIES ARE NOT PREVENTED
PAIN_FUNCTIONAL_ASSESSMENT: ACTIVITIES ARE NOT PREVENTED

## 2024-10-01 ASSESSMENT — PAIN DESCRIPTION - DESCRIPTORS
DESCRIPTORS: ACHING;SHARP
DESCRIPTORS: ACHING;SHARP

## 2024-10-01 ASSESSMENT — PAIN DESCRIPTION - ORIENTATION
ORIENTATION: LEFT;UPPER
ORIENTATION: LEFT;UPPER

## 2024-10-01 ASSESSMENT — PAIN DESCRIPTION - ONSET: ONSET: ON-GOING

## 2024-10-01 ASSESSMENT — PAIN DESCRIPTION - FREQUENCY: FREQUENCY: CONTINUOUS

## 2024-10-01 ASSESSMENT — PAIN DESCRIPTION - PAIN TYPE: TYPE: ACUTE PAIN

## 2024-10-01 NOTE — ED PROVIDER NOTES
tablet Take 1 tablet by mouth daily         ALLERGIES    Allergies   Allergen Reactions    Augmentin [Amoxicillin-Pot Clavulanate]      Gave Diarhhea         FAMILY HISTORY    Family History   Problem Relation Age of Onset    Heart Disease Mother     High Cholesterol Mother     High Blood Pressure Mother     Heart Surgery Mother     Other Mother         Heart Cath     Heart Disease Father     Heart Failure Father     Alcohol Abuse Maternal Grandfather     Diabetes Paternal Grandfather     Other Maternal Aunt         All maternal aunts had heart disease    Breast Cancer Maternal Aunt        SOCIAL HISTORY    Social History     Socioeconomic History    Marital status:      Spouse name: None    Number of children: None    Years of education: None    Highest education level: None   Tobacco Use    Smoking status: Former     Current packs/day: 0.00     Average packs/day: 0.5 packs/day for 23.0 years (11.5 ttl pk-yrs)     Types: Cigarettes     Start date: 1994     Quit date: 2017     Years since quittin.0    Smokeless tobacco: Never   Vaping Use    Vaping status: Never Used   Substance and Sexual Activity    Alcohol use: Yes     Alcohol/week: 5.0 standard drinks of alcohol     Types: 5 Shots of liquor per week     Comment: occasional     Drug use: Not Currently     Social Determinants of Health     Financial Resource Strain: Low Risk  (2024)    Overall Financial Resource Strain (CARDIA)     Difficulty of Paying Living Expenses: Not very hard   Food Insecurity: No Food Insecurity (2024)    Hunger Vital Sign     Worried About Running Out of Food in the Last Year: Never true     Ran Out of Food in the Last Year: Never true   Transportation Needs: Unknown (2024)    PRAPARE - Transportation     Lack of Transportation (Non-Medical): No   Physical Activity: Sufficiently Active (2024)    Exercise Vital Sign     Days of Exercise per Week: 3 days     Minutes of Exercise per Session: 120 min

## 2024-10-01 NOTE — PROGRESS NOTES
Ticket to ride completed. The following information was reported off:  Name  Allergies  Orientation Level  Destination  Safety Issues  Code Status  Oxygen Requirements  Special needs including mobility, language, communication

## 2024-10-02 VITALS
OXYGEN SATURATION: 94 % | DIASTOLIC BLOOD PRESSURE: 72 MMHG | BODY MASS INDEX: 27.68 KG/M2 | HEIGHT: 66 IN | WEIGHT: 172.2 LBS | SYSTOLIC BLOOD PRESSURE: 133 MMHG | RESPIRATION RATE: 16 BRPM | TEMPERATURE: 97.8 F | HEART RATE: 75 BPM

## 2024-10-02 LAB
MICROORGANISM SPEC CULT: NORMAL
SPECIMEN DESCRIPTION: NORMAL

## 2024-10-03 LAB
EKG ATRIAL RATE: 65 BPM
EKG P AXIS: 52 DEGREES
EKG P-R INTERVAL: 166 MS
EKG Q-T INTERVAL: 428 MS
EKG QRS DURATION: 70 MS
EKG QTC CALCULATION (BAZETT): 445 MS
EKG R AXIS: -10 DEGREES
EKG T AXIS: 25 DEGREES
EKG VENTRICULAR RATE: 65 BPM

## 2024-10-03 PROCEDURE — 93010 ELECTROCARDIOGRAM REPORT: CPT | Performed by: INTERNAL MEDICINE

## 2024-10-08 ENCOUNTER — TELEPHONE (OUTPATIENT)
Dept: PRIMARY CARE CLINIC | Age: 69
End: 2024-10-08

## 2024-10-08 ENCOUNTER — OFFICE VISIT (OUTPATIENT)
Dept: PRIMARY CARE CLINIC | Age: 69
End: 2024-10-08
Payer: MEDICARE

## 2024-10-08 VITALS
HEIGHT: 66 IN | DIASTOLIC BLOOD PRESSURE: 70 MMHG | OXYGEN SATURATION: 95 % | HEART RATE: 75 BPM | WEIGHT: 165.7 LBS | SYSTOLIC BLOOD PRESSURE: 120 MMHG | BODY MASS INDEX: 26.63 KG/M2

## 2024-10-08 DIAGNOSIS — K66.1 HEMOPERITONEUM: ICD-10-CM

## 2024-10-08 DIAGNOSIS — Z09 HOSPITAL DISCHARGE FOLLOW-UP: ICD-10-CM

## 2024-10-08 DIAGNOSIS — S36.029D SPLEEN HEMATOMA, SUBSEQUENT ENCOUNTER: Primary | ICD-10-CM

## 2024-10-08 DIAGNOSIS — E87.6 LOW BLOOD POTASSIUM: ICD-10-CM

## 2024-10-08 DIAGNOSIS — F41.9 ANXIETY: ICD-10-CM

## 2024-10-08 DIAGNOSIS — R10.12 LEFT UPPER QUADRANT PAIN: ICD-10-CM

## 2024-10-08 PROCEDURE — 86663 EPSTEIN-BARR ANTIBODY: CPT

## 2024-10-08 PROCEDURE — 86665 EPSTEIN-BARR CAPSID VCA: CPT

## 2024-10-08 PROCEDURE — 85025 COMPLETE CBC W/AUTO DIFF WBC: CPT

## 2024-10-08 PROCEDURE — 86664 EPSTEIN-BARR NUCLEAR ANTIGEN: CPT

## 2024-10-08 PROCEDURE — 80053 COMPREHEN METABOLIC PANEL: CPT

## 2024-10-08 RX ORDER — OXYCODONE HYDROCHLORIDE 5 MG/1
5 TABLET ORAL EVERY 6 HOURS PRN
COMMUNITY
Start: 2024-10-05 | End: 2024-10-08

## 2024-10-08 RX ORDER — HYDROCODONE BITARTRATE AND ACETAMINOPHEN 5; 325 MG/1; MG/1
1 TABLET ORAL EVERY 12 HOURS PRN
Qty: 6 TABLET | Refills: 0 | Status: SHIPPED | OUTPATIENT
Start: 2024-10-08 | End: 2024-10-13

## 2024-10-08 NOTE — TELEPHONE ENCOUNTER
Care Transitions Initial Follow Up Call    Outreach made within 2 business days of discharge: Yes    Patient: Dai Sanchez Patient : 1955   MRN: [id number]  Reason for Admission: Spleen hematoma  Discharge Date: 10/2/24       Spoke with: Patient    Discharge department/facility: [facility] Interactive Patient Contact:  Was patient able to fill all prescriptions: Yes  Was patient instructed to bring all medications to the follow-up visit: Yes  Is patient taking all medications as directed in the discharge summary? Yes  Does patient understand their discharge instructions: Yes  Does patient have questions or concerns that need addressed prior to 7-14 day follow up office visit: no    Additional needs identified to be addressed with provider  No needs identified             Scheduled appointment with PCP within 7-14 days    Follow Up  Future Appointments   Date Time Provider Department Center   10/8/2024 10:20 AM Kelly Rodríguez APRN - CNP nw [facility] DEP   2025  9:20 AM Kelly Rodríguez APRN - CNP nw [facility] DEP       Pippa Tobar LPN

## 2024-10-08 NOTE — PROGRESS NOTES
hydroxychloroquine (PLAQUENIL) 200 MG tablet take 1 tablet by mouth once daily      cetirizine (ZYRTEC) 10 MG tablet Take 1 tablet by mouth daily      Glucosamine 500 MG CAPS Take 2,000 tablets by mouth daily      Coenzyme Q10 (CO Q-10) 200 MG CAPS Take 1 capsule by mouth      NONFORMULARY Indications: Womens 1 a day 50 plus.      vitamin B-12 (CYANOCOBALAMIN) 500 MCG tablet Take 1 tablet by mouth daily      Magnesium 400 MG CAPS Take  by mouth daily.      vitamin D (CHOLECALCIFEROL) 400 UNITS TABS tablet Take 2.5 tablets by mouth daily      Ascorbic Acid (VITAMIN C) 500 MG tablet Take 1 tablet by mouth daily          Medications patient taking as of now reconciled against medications ordered at time of hospital discharge: Yes    A comprehensive review of systems was negative except for what was noted in the HPI.    Objective:    /70 (Site: Left Upper Arm, Position: Sitting)   Pulse 75   Ht 1.676 m (5' 6\")   Wt 75.2 kg (165 lb 11.2 oz) Comment: Pt reported  LMP 05/20/2012   SpO2 95%   BMI 26.74 kg/m²   General Appearance: alert and oriented to person, place and time, well developed and well- nourished, in no acute distress  Skin: warm and dry, no rash or erythema  Head: normocephalic and atraumatic  Eyes: pupils equal, round, and reactive to light, extraocular eye movements intact, conjunctivae normal  ENT: tympanic membrane, external ear and ear canal normal bilaterally, nose without deformity, nasal mucosa and turbinates normal without polyps  Neck: supple and non-tender without mass, no thyromegaly or thyroid nodules, no cervical lymphadenopathy  Pulmonary/Chest: clear to auscultation bilaterally- no wheezes, rales or rhonchi, normal air movement, no respiratory distress  Cardiovascular: normal rate, regular rhythm, normal S1 and S2, no murmurs, rubs, clicks, or gallops, distal pulses intact, no carotid bruits  Abdomen: soft, non-tender, non-distended, normal bowel sounds, did not palpate abdomen due

## 2024-10-08 NOTE — TELEPHONE ENCOUNTER
Pt has future appointments scheduled     Derm- 10/18/24    [facility]/ Ortho- 11/12/24    Dr. Mars- 11/14/24

## 2024-10-08 NOTE — PATIENT INSTRUCTIONS
THANK YOU FOR TRUSTING US WITH YOUR HEALTHCARE !!    The associates caring for you today were:    Family Practice Provider: Kelly RUELAS-ANEESH    Clinical Team Nurse: Pippa Tobar LPN    Clinical Team Medical Assistant: Sheri Chavez MA    Our goal is to provide you with EXCEPTIONAL patient care, and we strive to do this for EVERY patient, EVERY visit. Since we care about you and your experience, you may receive a patient satisfaction survey from Ryann Wahl by postal mail/email/text. We truly welcome your feedback and ask that you complete the survey to let us know how we are doing.    Important Numbers:  [facility] phone [phone]   [place] Office Nurse/MA Line: [phone]  Fax  [phone]   Central [phone]  Billing questions: [phone]  Medical Records Request: [phone]    Manage your healthcare  with [facility]. To activate your account, visit [email or web address]   DIGITAL scheduling available now through my chart.  Schedule your next appointment at your convenience through your my chart.       [place] Office Hours:  Monday: [place] office location 8-5 ([phone]) Offering additional late hours the first Monday of the month until 7 pm.   Tuesday: 8: :  812 Noon, closed  of the month   : 7:30-4:30   SURVEY:    You may be receiving a survey from Ryann Wahl regarding your visit today.    Please complete the survey to enable us to provide the highest quality of care to you and your family.    If you cannot score us a very good on any question, please call the office to discuss how we could have made your experience a very good one.    Thank you.

## 2024-10-09 ENCOUNTER — HOSPITAL ENCOUNTER (OUTPATIENT)
Age: 69
Setting detail: SPECIMEN
Discharge: HOME | End: 2024-10-09

## 2024-10-09 DIAGNOSIS — S36.029D SPLEEN HEMATOMA, SUBSEQUENT ENCOUNTER: ICD-10-CM

## 2024-10-09 DIAGNOSIS — E87.6 LOW BLOOD POTASSIUM: ICD-10-CM

## 2024-10-09 DIAGNOSIS — K66.1 HEMOPERITONEUM: ICD-10-CM

## 2024-10-09 LAB
ALBUMIN SERPL-MCNC: 3.8 G/DL (ref 3.5–5.2)
ALBUMIN/GLOB SERPL: 1.2 {RATIO} (ref 1–2.5)
ALP SERPL-CCNC: 54 U/L (ref 35–104)
ALT SERPL-CCNC: 14 U/L (ref 10–35)
ANION GAP SERPL CALCULATED.3IONS-SCNC: 13 MMOL/L (ref 9–16)
AST SERPL-CCNC: 28 U/L (ref 10–35)
BASOPHILS # BLD: 0.06 K/UL (ref 0–0.2)
BASOPHILS NFR BLD: 1 % (ref 0–2)
BILIRUB SERPL-MCNC: 0.3 MG/DL (ref 0–1.2)
BUN SERPL-MCNC: 17 MG/DL (ref 8–23)
BUN/CREAT SERPL: 15 (ref 9–20)
CALCIUM SERPL-MCNC: 9.7 MG/DL (ref 8.6–10.4)
CHLORIDE SERPL-SCNC: 99 MMOL/L (ref 98–107)
CO2 SERPL-SCNC: 24 MMOL/L (ref 20–31)
CREAT SERPL-MCNC: 1.1 MG/DL (ref 0.5–0.9)
EOSINOPHIL # BLD: 0.03 K/UL (ref 0–0.44)
EOSINOPHILS RELATIVE PERCENT: 0 % (ref 1–4)
ERYTHROCYTE [DISTWIDTH] IN BLOOD BY AUTOMATED COUNT: 14.3 % (ref 11.8–14.4)
GFR, ESTIMATED: 56 ML/MIN/1.73M2
GLUCOSE SERPL-MCNC: 89 MG/DL (ref 74–99)
HCT VFR BLD AUTO: 36.6 % (ref 36.3–47.1)
HGB BLD-MCNC: 11.6 G/DL (ref 11.9–15.1)
IMM GRANULOCYTES # BLD AUTO: <0.03 K/UL (ref 0–0.3)
IMM GRANULOCYTES NFR BLD: 0 %
LYMPHOCYTES NFR BLD: 0.96 K/UL (ref 1.1–3.7)
LYMPHOCYTES RELATIVE PERCENT: 12 % (ref 24–43)
MCH RBC QN AUTO: 31 PG (ref 25.2–33.5)
MCHC RBC AUTO-ENTMCNC: 31.7 G/DL (ref 28.4–34.8)
MCV RBC AUTO: 97.9 FL (ref 82.6–102.9)
MONOCYTES NFR BLD: 0.74 K/UL (ref 0.1–1.2)
MONOCYTES NFR BLD: 10 % (ref 3–12)
NEUTROPHILS NFR BLD: 77 % (ref 36–65)
NEUTS SEG NFR BLD: 5.98 K/UL (ref 1.5–8.1)
NRBC BLD-RTO: 0 PER 100 WBC
PLATELET # BLD AUTO: 282 K/UL (ref 138–453)
PMV BLD AUTO: 11.1 FL (ref 8.1–13.5)
POTASSIUM SERPL-SCNC: 4.3 MMOL/L (ref 3.7–5.3)
PROT SERPL-MCNC: 7 G/DL (ref 6.6–8.7)
RBC # BLD AUTO: 3.74 M/UL (ref 3.95–5.11)
SODIUM SERPL-SCNC: 136 MMOL/L (ref 136–145)
WBC OTHER # BLD: 7.8 K/UL (ref 3.5–11.3)

## 2024-10-10 LAB
EBV EA-D IGG SER-ACNC: 58 U/ML
EBV INTERPRETATION: ABNORMAL
EBV NA IGG SER IA-ACNC: 301 U/ML
EBV VCA IGG SER-ACNC: 932 U/ML
EBV VCA IGM SER-ACNC: 28 U/ML

## 2024-10-13 RX ORDER — CLORAZEPATE DIPOTASSIUM 7.5 MG/1
7.5 TABLET ORAL DAILY
Qty: 90 TABLET | Refills: 0 | Status: SHIPPED | OUTPATIENT
Start: 2024-10-13 | End: 2025-01-11

## 2024-10-29 ENCOUNTER — OFFICE VISIT (OUTPATIENT)
Dept: PRIMARY CARE CLINIC | Age: 69
End: 2024-10-29
Payer: MEDICARE

## 2024-10-29 ENCOUNTER — HOSPITAL ENCOUNTER (OUTPATIENT)
Dept: GENERAL RADIOLOGY | Age: 69
Discharge: HOME | End: 2024-10-31
Payer: MEDICARE

## 2024-10-29 ENCOUNTER — HOSPITAL ENCOUNTER (OUTPATIENT)
Age: 69
Discharge: HOME | End: 2024-10-31
Payer: MEDICARE

## 2024-10-29 ENCOUNTER — HOSPITAL ENCOUNTER (OUTPATIENT)
Age: 69
Setting detail: SPECIMEN
Discharge: HOME | End: 2024-10-29
Payer: MEDICARE

## 2024-10-29 ENCOUNTER — HOSPITAL ENCOUNTER (OUTPATIENT)
Age: 69
Discharge: HOME | End: 2024-10-29
Payer: MEDICARE

## 2024-10-29 VITALS
SYSTOLIC BLOOD PRESSURE: 110 MMHG | WEIGHT: 159.2 LBS | HEIGHT: 66 IN | HEART RATE: 82 BPM | OXYGEN SATURATION: 98 % | DIASTOLIC BLOOD PRESSURE: 70 MMHG | BODY MASS INDEX: 25.58 KG/M2

## 2024-10-29 DIAGNOSIS — R05.1 ACUTE COUGH: ICD-10-CM

## 2024-10-29 DIAGNOSIS — S36.029D SPLEEN HEMATOMA, SUBSEQUENT ENCOUNTER: ICD-10-CM

## 2024-10-29 DIAGNOSIS — I10 ESSENTIAL HYPERTENSION: ICD-10-CM

## 2024-10-29 DIAGNOSIS — R01.1 HEART MURMUR: ICD-10-CM

## 2024-10-29 DIAGNOSIS — M25.512 ACUTE PAIN OF LEFT SHOULDER: ICD-10-CM

## 2024-10-29 DIAGNOSIS — R39.15 URINARY URGENCY: ICD-10-CM

## 2024-10-29 DIAGNOSIS — R06.02 SHORTNESS OF BREATH: ICD-10-CM

## 2024-10-29 DIAGNOSIS — I35.0 NONRHEUMATIC AORTIC VALVE STENOSIS: ICD-10-CM

## 2024-10-29 DIAGNOSIS — J18.9 WALKING PNEUMONIA: ICD-10-CM

## 2024-10-29 DIAGNOSIS — J90 PLEURAL EFFUSION, LEFT: Primary | ICD-10-CM

## 2024-10-29 DIAGNOSIS — F41.9 ANXIETY: ICD-10-CM

## 2024-10-29 DIAGNOSIS — E27.8 LEFT ADRENAL MASS (HCC): ICD-10-CM

## 2024-10-29 PROBLEM — S36.029A SPLEEN HEMATOMA: Status: ACTIVE | Noted: 2024-10-29

## 2024-10-29 LAB
BASOPHILS # BLD: 0.09 K/UL (ref 0–0.2)
BASOPHILS NFR BLD: 1 % (ref 0–2)
BILIRUBIN, POC: NEGATIVE
BLOOD URINE, POC: NORMAL
CLARITY, POC: NORMAL
COLOR, POC: NORMAL
EOSINOPHIL # BLD: 0.22 K/UL (ref 0–0.4)
EOSINOPHILS RELATIVE PERCENT: 3 % (ref 0–5)
ERYTHROCYTE [DISTWIDTH] IN BLOOD BY AUTOMATED COUNT: 13.3 % (ref 12.1–15.2)
GLUCOSE URINE, POC: NEGATIVE MG/DL
HCT VFR BLD AUTO: 36.8 % (ref 36–46)
HGB BLD-MCNC: 11.7 G/DL (ref 12–16)
IMM GRANULOCYTES # BLD AUTO: 0.03 K/UL (ref 0–0.3)
IMM GRANULOCYTES NFR BLD: 0 % (ref 0–5)
KETONES, POC: NEGATIVE MG/DL
LEUKOCYTE EST, POC: NORMAL
LYMPHOCYTES NFR BLD: 1.33 K/UL (ref 1–4.8)
LYMPHOCYTES RELATIVE PERCENT: 16 % (ref 15–40)
MCH RBC QN AUTO: 29.3 PG (ref 26–34)
MCHC RBC AUTO-ENTMCNC: 31.8 G/DL (ref 31–37)
MCV RBC AUTO: 92.2 FL (ref 80–100)
MONOCYTES NFR BLD: 0.61 K/UL (ref 0–1)
MONOCYTES NFR BLD: 7 % (ref 4–8)
NEUTROPHILS NFR BLD: 73 % (ref 47–75)
NEUTS SEG NFR BLD: 6.03 K/UL (ref 2.5–7)
NITRITE, POC: NEGATIVE
PH, POC: 6
PLATELET # BLD AUTO: 344 K/UL (ref 140–450)
PMV BLD AUTO: 9.3 FL (ref 6–12)
PROTEIN, POC: >=300 MG/DL
RBC # BLD AUTO: 3.99 M/UL (ref 4–5.2)
SPECIFIC GRAVITY, POC: >=1.03
UROBILINOGEN, POC: 0.2 MG/DL
WBC OTHER # BLD: 8.3 K/UL (ref 3.5–11)

## 2024-10-29 PROCEDURE — 81002 URINALYSIS NONAUTO W/O SCOPE: CPT | Performed by: NURSE PRACTITIONER

## 2024-10-29 PROCEDURE — 87086 URINE CULTURE/COLONY COUNT: CPT

## 2024-10-29 PROCEDURE — 36415 COLL VENOUS BLD VENIPUNCTURE: CPT

## 2024-10-29 PROCEDURE — 85025 COMPLETE CBC W/AUTO DIFF WBC: CPT

## 2024-10-29 PROCEDURE — 87186 SC STD MICRODIL/AGAR DIL: CPT

## 2024-10-29 PROCEDURE — 71046 X-RAY EXAM CHEST 2 VIEWS: CPT

## 2024-10-29 PROCEDURE — 87077 CULTURE AEROBIC IDENTIFY: CPT

## 2024-10-29 PROCEDURE — 73030 X-RAY EXAM OF SHOULDER: CPT

## 2024-10-29 RX ORDER — AZITHROMYCIN 250 MG/1
TABLET, FILM COATED ORAL
Qty: 1 PACKET | Refills: 0 | Status: SHIPPED | OUTPATIENT
Start: 2024-10-29 | End: 2024-11-08

## 2024-10-29 NOTE — PROGRESS NOTES
needed:     Answer:   Yes     Order Specific Question:   Reason for exam:     Answer:   large left pleural effusion, no injury but hx remote spleenic hematoma   • CT ABDOMEN PELVIS W WO CONTRAST Additional Contrast? Radiologist Recommendation (adrenal protocol without, then with with delayed images to make sure washes out properly)     Standing Status:   Future     Standing Expiration Date:   10/29/2025     Order Specific Question:   Additional Contrast?     Answer:   Radiologist Recommendation     Comments:   adrenal protocol without, then with with delayed images to make sure washes out properly     Order Specific Question:   STAT Creatinine as needed:     Answer:   No     Order Specific Question:   Reason for exam:     Answer:   f/u abnormal CT with LEFT adrenal mass to rule out malignancy versus adenoma   • CBC with Auto Differential     Standing Status:   Future     Number of Occurrences:   1     Standing Expiration Date:   10/29/2025   • C-Reactive Protein     Standing Status:   Future     Standing Expiration Date:   10/30/2025   • Sedimentation Rate     Standing Status:   Future     Standing Expiration Date:   10/30/2025   • POCT Urinalysis no Micro         Patient Instructions   THANK YOU FOR TRUSTING US WITH YOUR HEALTHCARE !!    The associates caring for you today were:    Family Practice Provider: Kelly RUELAS-ANEESH    Clinical Team Nurse: Pippa Tobar LPN    Clinical Team Medical Assistant: Sheri Chavez MA    Our goal is to provide you with EXCEPTIONAL patient care, and we strive to do this for EVERY patient, EVERY visit. Since we care about you and your experience, you may receive a patient satisfaction survey from Ryann Wahl by postal mail/email/text. We truly welcome your feedback and ask that you complete the survey to let us know how we are doing.    Important Numbers:  [facility] phone [phone]   [employer] Office Nurse/MA Line:

## 2024-10-29 NOTE — PATIENT INSTRUCTIONS
THANK YOU FOR TRUSTING US WITH YOUR HEALTHCARE !!    The associates caring for you today were:    Family Practice Provider: Kelly RUELAS-ANEESH    Clinical Team Nurse: Pippa Tobar LPN    Clinical Team Medical Assistant: Sheri Chavez MA    Our goal is to provide you with EXCEPTIONAL patient care, and we strive to do this for EVERY patient, EVERY visit. Since we care about you and your experience, you may receive a patient satisfaction survey from Ryann Wahl by postal mail/email/text. We truly welcome your feedback and ask that you complete the survey to let us know how we are doing.    Important Numbers:  [facility] phone [phone]   [place] Office Nurse/MA Line: [phone]  Fax  [phone]   Central [phone]  Billing questions: [phone]  Medical Records Request: [phone]    Manage your healthcare  with [facility]. To activate your account, visit [email or web address]   DIGITAL scheduling available now through my chart.  Schedule your next appointment at your convenience through your my chart.       [place] Office Hours:  Monday: [place] office location 8-5 ([phone]) Offering additional late hours the first Monday of the month until 7 pm.   Tuesday: 8: :  812 Noon, closed  of the month   : 7:30-4:30   SURVEY:    You may be receiving a survey from Ryann Wahl regarding your visit today.    Please complete the survey to enable us to provide the highest quality of care to you and your family.    If you cannot score us a very good on any question, please call the office to discuss how we could have made your experience a very good one.    Thank you.

## 2024-10-30 ENCOUNTER — HOSPITAL ENCOUNTER (OUTPATIENT)
Dept: CT IMAGING | Age: 69
Discharge: HOME | End: 2024-11-01
Payer: MEDICARE

## 2024-10-30 DIAGNOSIS — S36.029D SPLEEN HEMATOMA, SUBSEQUENT ENCOUNTER: ICD-10-CM

## 2024-10-30 DIAGNOSIS — I35.0 NONRHEUMATIC AORTIC VALVE STENOSIS: ICD-10-CM

## 2024-10-30 DIAGNOSIS — E27.8 LEFT ADRENAL MASS (HCC): ICD-10-CM

## 2024-10-30 DIAGNOSIS — J18.9 WALKING PNEUMONIA: ICD-10-CM

## 2024-10-30 DIAGNOSIS — J90 PLEURAL EFFUSION, LEFT: ICD-10-CM

## 2024-10-30 PROCEDURE — 71260 CT THORAX DX C+: CPT

## 2024-10-30 PROCEDURE — 6360000004 HC RX CONTRAST MEDICATION: Performed by: NURSE PRACTITIONER

## 2024-10-30 PROCEDURE — 74178 CT ABD&PLV WO CNTR FLWD CNTR: CPT

## 2024-10-30 RX ORDER — IOPAMIDOL 755 MG/ML
75 INJECTION, SOLUTION INTRAVASCULAR
Status: COMPLETED | OUTPATIENT
Start: 2024-10-30 | End: 2024-10-30

## 2024-10-30 RX ADMIN — IOPAMIDOL 75 ML: 755 INJECTION, SOLUTION INTRAVENOUS at 12:28

## 2024-10-30 ASSESSMENT — ENCOUNTER SYMPTOMS
SORE THROAT: 0
CHEST TIGHTNESS: 0
WHEEZING: 0
SHORTNESS OF BREATH: 1
COUGH: 1
EYES NEGATIVE: 1
ABDOMINAL DISTENTION: 0
RHINORRHEA: 0

## 2024-10-31 ENCOUNTER — TELEPHONE (OUTPATIENT)
Dept: PRIMARY CARE CLINIC | Age: 69
End: 2024-10-31

## 2024-10-31 ENCOUNTER — APPOINTMENT (OUTPATIENT)
Dept: ULTRASOUND IMAGING | Age: 69
End: 2024-10-31
Payer: MEDICARE

## 2024-10-31 ENCOUNTER — HOSPITAL ENCOUNTER (EMERGENCY)
Age: 69
Discharge: HOME | End: 2024-10-31
Payer: MEDICARE

## 2024-10-31 VITALS
WEIGHT: 159 LBS | HEART RATE: 76 BPM | BODY MASS INDEX: 25.66 KG/M2 | RESPIRATION RATE: 21 BRPM | TEMPERATURE: 97.8 F | OXYGEN SATURATION: 96 % | SYSTOLIC BLOOD PRESSURE: 132 MMHG | DIASTOLIC BLOOD PRESSURE: 88 MMHG

## 2024-10-31 DIAGNOSIS — J90 PLEURAL EFFUSION: Primary | ICD-10-CM

## 2024-10-31 LAB
ALBUMIN SERPL-MCNC: 3.7 G/DL (ref 3.5–5.2)
ALBUMIN/GLOB SERPL: 1.1 {RATIO} (ref 1–2.5)
ALP SERPL-CCNC: 80 U/L (ref 35–104)
ALT SERPL-CCNC: 17 U/L (ref 10–35)
ANION GAP SERPL CALCULATED.3IONS-SCNC: 13 MMOL/L (ref 9–16)
AST SERPL-CCNC: 26 U/L (ref 10–35)
BASOPHILS # BLD: 0.13 K/UL (ref 0–0.2)
BASOPHILS NFR BLD: 2 % (ref 0–2)
BILIRUB SERPL-MCNC: <0.2 MG/DL (ref 0–1.2)
BNP SERPL-MCNC: 260 PG/ML (ref 0–125)
BUN SERPL-MCNC: 30 MG/DL (ref 8–23)
BUN/CREAT SERPL: 38 (ref 9–20)
CALCIUM SERPL-MCNC: 9.7 MG/DL (ref 8.6–10.4)
CHLORIDE SERPL-SCNC: 105 MMOL/L (ref 98–107)
CO2 SERPL-SCNC: 23 MMOL/L (ref 20–31)
CREAT SERPL-MCNC: 0.8 MG/DL (ref 0.5–0.9)
EKG ATRIAL RATE: 82 BPM
EKG P AXIS: 25 DEGREES
EKG P-R INTERVAL: 146 MS
EKG Q-T INTERVAL: 364 MS
EKG QRS DURATION: 80 MS
EKG QTC CALCULATION (BAZETT): 425 MS
EKG R AXIS: 4 DEGREES
EKG T AXIS: 53 DEGREES
EKG VENTRICULAR RATE: 82 BPM
EOSINOPHIL # BLD: 0.23 K/UL (ref 0–0.44)
EOSINOPHILS RELATIVE PERCENT: 3 % (ref 1–4)
ERYTHROCYTE [DISTWIDTH] IN BLOOD BY AUTOMATED COUNT: 13.4 % (ref 11.8–14.4)
GFR, ESTIMATED: 77 ML/MIN/1.73M2
GLUCOSE SERPL-MCNC: 94 MG/DL (ref 74–99)
HCT VFR BLD AUTO: 35.8 % (ref 36.3–47.1)
HGB BLD-MCNC: 11.6 G/DL (ref 11.9–15.1)
IMM GRANULOCYTES # BLD AUTO: <0.03 K/UL (ref 0–0.3)
IMM GRANULOCYTES NFR BLD: 0 %
LYMPHOCYTES NFR BLD: 1.66 K/UL (ref 1.1–3.7)
LYMPHOCYTES RELATIVE PERCENT: 21 % (ref 24–43)
MAGNESIUM SERPL-MCNC: 2 MG/DL (ref 1.6–2.4)
MCH RBC QN AUTO: 29.6 PG (ref 25.2–33.5)
MCHC RBC AUTO-ENTMCNC: 32.4 G/DL (ref 28.4–34.8)
MCV RBC AUTO: 91.3 FL (ref 82.6–102.9)
MONOCYTES NFR BLD: 0.74 K/UL (ref 0.1–1.2)
MONOCYTES NFR BLD: 9 % (ref 3–12)
NEUTROPHILS NFR BLD: 65 % (ref 36–65)
NEUTS SEG NFR BLD: 5.32 K/UL (ref 1.5–8.1)
NRBC BLD-RTO: 0 PER 100 WBC
PLATELET # BLD AUTO: 351 K/UL (ref 138–453)
PMV BLD AUTO: 10.1 FL (ref 8.1–13.5)
POTASSIUM SERPL-SCNC: 3.7 MMOL/L (ref 3.7–5.3)
PROT SERPL-MCNC: 7.1 G/DL (ref 6.6–8.7)
RBC # BLD AUTO: 3.92 M/UL (ref 3.95–5.11)
SODIUM SERPL-SCNC: 141 MMOL/L (ref 136–145)
TROPONIN I SERPL HS-MCNC: 18 NG/L (ref 0–14)
TROPONIN I SERPL HS-MCNC: 18 NG/L (ref 0–14)
WBC OTHER # BLD: 8.1 K/UL (ref 3.5–11.3)

## 2024-10-31 PROCEDURE — 99284 EMERGENCY DEPT VISIT MOD MDM: CPT

## 2024-10-31 PROCEDURE — 83880 ASSAY OF NATRIURETIC PEPTIDE: CPT

## 2024-10-31 PROCEDURE — 93005 ELECTROCARDIOGRAM TRACING: CPT

## 2024-10-31 PROCEDURE — 36415 COLL VENOUS BLD VENIPUNCTURE: CPT

## 2024-10-31 PROCEDURE — 83735 ASSAY OF MAGNESIUM: CPT

## 2024-10-31 PROCEDURE — 84484 ASSAY OF TROPONIN QUANT: CPT

## 2024-10-31 PROCEDURE — 80053 COMPREHEN METABOLIC PANEL: CPT

## 2024-10-31 PROCEDURE — 76604 US EXAM CHEST: CPT

## 2024-10-31 PROCEDURE — 93010 ELECTROCARDIOGRAM REPORT: CPT | Performed by: INTERNAL MEDICINE

## 2024-10-31 PROCEDURE — 85025 COMPLETE CBC W/AUTO DIFF WBC: CPT

## 2024-10-31 ASSESSMENT — PAIN DESCRIPTION - LOCATION: LOCATION: SHOULDER

## 2024-10-31 ASSESSMENT — PAIN DESCRIPTION - ORIENTATION: ORIENTATION: LEFT

## 2024-10-31 ASSESSMENT — PAIN - FUNCTIONAL ASSESSMENT: PAIN_FUNCTIONAL_ASSESSMENT: 0-10

## 2024-10-31 ASSESSMENT — PAIN SCALES - GENERAL: PAINLEVEL_OUTOF10: 4

## 2024-10-31 NOTE — ED PROVIDER NOTES
[facility] ED  EMERGENCY DEPARTMENT ENCOUNTER        Pt Name: Dai Sanchez  MRN: [id number]  Birthdate 1955  Date of evaluation: 10/31/2024  Provider: Renetta Marcos MD  PCP: Kelly Rodríguez APRN - CNP  Note Started: 4:11 PM EDT 10/31/24    CHIEF COMPLAINT       Chief Complaint   Patient presents with    Abnormal CT     New left pleural effusion. With shortness of breath ongoing for approx 1 month.            HISTORY OF PRESENT ILLNESS: 1 or more Elements     Dai Sanchez is a 69 y.o. female who presents from outpatient after getting a CT this showing significant left pleural effusions with loculations.  Patient has been complaining of shortness of breath ongoing for the past month.  Denies any oxygen requirement.  She was sent to PCP so she could be transferred to a facility that has pulmonology and IR capabilities.  Patient is currently complaining of shortness of breath but denies any other symptoms including no fever, chills, n/v, headache, dizziness, vision changes, neck tenderness or stiffness, weakness, cp, palpitations, leg swelling/tenderness cough, abd pain, dysuria, hematuria, diarrhea, constipation, bloody stools.    Nursing Notes were all reviewed and agreed with or any disagreements were addressed in the HPI.    ROS:   Pertinent positives and negatives are stated within HPI, all other systems reviewed and are negative.      --------------------------------------------- PAST HISTORY ---------------------------------------------  Past Medical History:  has a past medical history of Arthritis, Cancer (HCC), Cervical spondylosis, DDD (degenerative disc disease), cervical, Discoid lupus, Hay fever, Hormone disorder, Hypertension, Lipoma, Retinal detachment with retinal defect of left eye, and Rosacea.    Past Surgical History:  has a past surgical history that includes back surgery (1988); Foot surgery; Hysterectomy; Bladder surgery; lipoma resection (2009); Colonoscopy (09/23/2024);  1.10 - 3.70 k/uL    Monocytes Absolute 0.74 0.10 - 1.20 k/uL    Eosinophils Absolute 0.23 0.00 - 0.44 k/uL    Basophils Absolute 0.13 0.00 - 0.20 k/uL    Immature Granulocytes Absolute <0.03 0.00 - 0.30 k/uL   Magnesium   Result Value Ref Range    Magnesium 2.0 1.6 - 2.4 mg/dL   Troponin   Result Value Ref Range    Troponin, High Sensitivity 18 (H) 0 - 14 ng/L   Comprehensive Metabolic Panel   Result Value Ref Range    Sodium 141 136 - 145 mmol/L    Potassium 3.7 3.7 - 5.3 mmol/L    Chloride 105 98 - 107 mmol/L    CO2 23 20 - 31 mmol/L    Anion Gap 13 9 - 16 mmol/L    Glucose 94 74 - 99 mg/dL    BUN 30 (H) 8 - 23 mg/dL    Creatinine 0.8 0.50 - 0.90 mg/dL    Est, Glom Filt Rate 77 >60 mL/min/1.73m2    BUN/Creatinine Ratio 38 (H) 9 - 20    Calcium 9.7 8.6 - 10.4 mg/dL    Total Protein 7.1 6.6 - 8.7 g/dL    Albumin 3.7 3.5 - 5.2 g/dL    Albumin/Globulin Ratio 1.1 1.0 - 2.5    Total Bilirubin <0.2 0.00 - 1.20 mg/dL    Alkaline Phosphatase 80 35 - 104 U/L    ALT 17 10 - 35 U/L    AST 26 10 - 35 U/L   Brain Natriuretic Peptide   Result Value Ref Range    NT Pro- (H) 0 - 125 pg/mL   Troponin   Result Value Ref Range    Troponin, High Sensitivity 18 (H) 0 - 14 ng/L   EKG 12 Lead   Result Value Ref Range    Ventricular Rate 82 BPM    Atrial Rate 82 BPM    P-R Interval 146 ms    QRS Duration 80 ms    Q-T Interval 364 ms    QTc Calculation (Bazett) 425 ms    P Axis 25 degrees    R Axis 4 degrees    T Axis 53 degrees       RADIOLOGY:   Non-plain film images such as CT, Ultrasound and MRI are read by the radiologist. Plain radiographic images are visualized and preliminarily interpreted by the ED Provider with the below findings:        Interpretation per the Radiologist below, if available at the time of this note:    US CHEST INCLUDING MEDIASTINUM    (Results Pending)     CT CHEST W CONTRAST    Result Date: 10/30/2024  EXAMINATION: CT CHEST W CONTRAST. HISTORY: Pleural effusion, left. COMPARISON: CT abdomen and pelvis

## 2024-10-31 NOTE — ED NOTES
[facility] called with [facility] on line to speak with Dr. Marcos

## 2024-10-31 NOTE — TELEPHONE ENCOUNTER
layering hematocrit level or active bleeding.    The pelvic small bowel loops and urinary bladder are unremarkable.  Prior hysterectomy is noted.  There is mild colonic diverticulosis.    No new osseous findings are seen.  Images on Order [id number]    Scan on 10/2/2024: CT Angiogram Abdomen Pelvis        Exam End: 10/02/24 05:30    Specimen Collected: 10/02/24 05:32 Last Resulted: 10/02/24 05:42   Received From: [facility]      Follow up appt post [facility] [place]   Contains abnormal data Sejal-Barr virus VCA antibody panel 10/8/2024   Order: [id number]  Status: Final result       Visible to patient: No (not released)       Next appt: 11/13/2024 at 09:20 AM in Primary Care (Kelly Rodríguez, JESSENIA - CNP)    1 Result Note      Component  Ref Range & Units 10/8/24 1146   EBV VCA,IgG  <100 U/mL 932 High    EBV Nuclear Ag Ab  <100 U/mL 301 High    EBV EA IgG  <100 U/mL 58   EBV VCA,IgM  <100 U/mL 28   EBV Interp. (NOTE)   Comment:  Reference Range:      Negative                 <100  U/mL      Positive                 >120  U/mL      Equivocal              100-120 U/mL      Guidelines for the Interpretation of Sejal-Valle Viral Serologies                                        Antibodies  Clinical Situation          IgG-VCA    EBNA      EA    IgM-VCA  _______________________________________________________________  No past infection             -         -        -       -  Acute infection               +         -        +       +  Convalescent phase            +         +       +/-     +/-  Past infection                +         +        -       -  Chronic or reactivated        +         +        +       -      infection      +  = Antibody present    - = Antibody absent    Reference: Clinical Diagnosis and Managem        Please see 10/29/24 OV for additional details      Kelly RUELAS CNP

## 2024-11-01 LAB
MICROORGANISM SPEC CULT: ABNORMAL
SERVICE CMNT-IMP: ABNORMAL
SPECIMEN DESCRIPTION: ABNORMAL

## 2024-11-05 ENCOUNTER — TELEPHONE (OUTPATIENT)
Dept: PRIMARY CARE CLINIC | Age: 69
End: 2024-11-05

## 2024-11-05 NOTE — TELEPHONE ENCOUNTER
Care Transitions Initial Follow Up Call    Outreach made within 2 business days of discharge: Yes    Patient: Dai Sanchez Patient : 1955   MRN: [id number]  Reason for Admission: Pleural effusion       Discharge Date: 10/31/24       Spoke with: Patient    Discharge department/facility: [facility] Interactive Patient Contact:  Was patient able to fill all prescriptions: Yes  Was patient instructed to bring all medications to the follow-up visit: Yes  Is patient taking all medications as directed in the discharge summary? Yes  Does patient understand their discharge instructions: Yes  Does patient have questions or concerns that need addressed prior to 7-14 day follow up office visit: no    Additional needs identified to be addressed with provider  No needs identified             Scheduled appointment with PCP within 7-14 days    Follow Up  Future Appointments   Date Time Provider Department Center   2024  9:20 AM Kelly Rodríguez APRN - CNP nw [facility] DEP   2024 10:00 AM MW ECHO 1 [facility] DENNIS [facility] Rad   2025  9:20 AM Kelly Rodríguez APRN - CNP nw [facility] DEP       Pippa Tobar LPN

## 2024-11-08 ENCOUNTER — HOSPITAL ENCOUNTER (OUTPATIENT)
Dept: GENERAL RADIOLOGY | Age: 69
Discharge: HOME | End: 2024-11-10
Payer: MEDICARE

## 2024-11-08 ENCOUNTER — OFFICE VISIT (OUTPATIENT)
Dept: PRIMARY CARE CLINIC | Age: 69
End: 2024-11-08

## 2024-11-08 ENCOUNTER — HOSPITAL ENCOUNTER (OUTPATIENT)
Age: 69
Discharge: HOME | End: 2024-11-10
Payer: MEDICARE

## 2024-11-08 VITALS
HEART RATE: 65 BPM | WEIGHT: 154.8 LBS | SYSTOLIC BLOOD PRESSURE: 114 MMHG | HEIGHT: 66 IN | OXYGEN SATURATION: 97 % | DIASTOLIC BLOOD PRESSURE: 72 MMHG | BODY MASS INDEX: 24.88 KG/M2

## 2024-11-08 DIAGNOSIS — Z98.890 S/P THORACENTESIS: ICD-10-CM

## 2024-11-08 DIAGNOSIS — I10 ESSENTIAL HYPERTENSION: ICD-10-CM

## 2024-11-08 DIAGNOSIS — E27.8 LEFT ADRENAL MASS (HCC): ICD-10-CM

## 2024-11-08 DIAGNOSIS — S36.029D SPLEEN HEMATOMA, SUBSEQUENT ENCOUNTER: ICD-10-CM

## 2024-11-08 DIAGNOSIS — J90 PLEURAL EFFUSION, LEFT: ICD-10-CM

## 2024-11-08 DIAGNOSIS — Z09 HOSPITAL DISCHARGE FOLLOW-UP: ICD-10-CM

## 2024-11-08 DIAGNOSIS — Z98.890 S/P THORACENTESIS: Primary | ICD-10-CM

## 2024-11-08 PROCEDURE — 71046 X-RAY EXAM CHEST 2 VIEWS: CPT

## 2024-11-08 RX ORDER — FLUCONAZOLE 150 MG/1
150 TABLET ORAL DAILY
Qty: 2 TABLET | Refills: 0 | Status: SHIPPED | OUTPATIENT
Start: 2024-11-08

## 2024-11-08 NOTE — PROGRESS NOTES
[place] [id number]     Flow Cytometry Interpretive  Specimen: Body Fluid - Specimen from pleura obtained by thoracentesis (specimen)  Component 9 d ago   FLOW INTERPRETATION   Pleural fluid, flow cytometry:     No monotypic B-cell or abnormal T-cell population identified, see comment.              Comment:  Flow cytometric immunophenotypic analysis is performed on the pleural fluid using fluorescently labeled antibodies to the following antigens: CD3, CD4, CD5, CD7, CD8, CD13, CD16, CD19, CD34, CD45, CD56, kappa surface immunoglobulin light chains, and lambda surface immunoglobulin light chains.    Leukocyte subsets are gated based on CD45 and side-scatter properties.  Lymphocytes represent 92.6% of total events.  Of the lymphocytes, 91.3% are T-cells and 6.7% are B-cells.  The T-cells have a CD4:CD8 ratio of 8.2.      The T-cells have a normal immunophenotype.  The B-cells are polytypic and have a normal immunophenotype.        Reviewed by Pathologist: IRWIN Rendon MD   Resulting [facility]   Narrative  Performed by [facility]    ABORH Verification  Specimen: Blood - Blood specimen (specimen)  Component 1 mo ago Comments   ABORh O Positive    Verification of ABORH ABO/Rh Verification Patient's ABO/Rh is verified.   Resulting Agency [facility]    Specimen Collected: 10/02/24 04:17    Performed by: [facility]          7 d ago    Culture >100,000 CFU/mL Pseudomonas fluorescens Abnormal    Resulting [facility]   Susceptibility    Organism Antibiotic Method Susceptibility   Pseudomonas fluorescens Cefepime AMY <=1 mcg/mL: Susceptible   Pseudomonas fluorescens Ceftriaxone AMY <=1 mcg/mL: Susceptible   Pseudomonas fluorescens Ciprofloxacin AMY 0.5 mcg/mL: Susceptible    Comment: AVOID fluoroquinolone treatment whenever possible.  Risk of serious side effects may outweigh benefit.   Pseudomonas fluorescens Gentamicin

## 2024-11-08 NOTE — PATIENT INSTRUCTIONS
THANK YOU FOR TRUSTING US WITH YOUR HEALTHCARE !!    The associates caring for you today were:    Family Practice Provider: Kelly RUELAS-ANEESH    Clinical Team Nurse: Pippa Tobar LPN    Clinical Team Medical Assistant: Sheri Chavez MA    Our goal is to provide you with EXCEPTIONAL patient care, and we strive to do this for EVERY patient, EVERY visit. Since we care about you and your experience, you may receive a patient satisfaction survey from Ryann Wahl by postal mail/email/text. We truly welcome your feedback and ask that you complete the survey to let us know how we are doing.    Important Numbers:  [facility] phone [phone]   [place] Office Nurse/MA Line: [phone]  Fax  [phone]   Central [phone]  Billing questions: [phone]  Medical Records Request: [phone]    Manage your healthcare  with [facility]. To activate your account, visit [email or web address]   DIGITAL scheduling available now through my chart.  Schedule your next appointment at your convenience through your my chart.       [place] Office Hours:  Monday: [place] office location 8-5 ([phone]) Offering additional late hours the first Monday of the month until 7 pm.   Tuesday: 8: :  812 Noon, closed  of the month   : 7:30-4:30   SURVEY:    You may be receiving a survey from Ryann Wahl regarding your visit today.    Please complete the survey to enable us to provide the highest quality of care to you and your family.    If you cannot score us a very good on any question, please call the office to discuss how we could have made your experience a very good one.    Thank you.

## 2024-11-19 ENCOUNTER — HOSPITAL ENCOUNTER (OUTPATIENT)
Age: 69
Setting detail: SPECIMEN
Discharge: HOME | End: 2024-11-19
Payer: MEDICARE

## 2024-11-19 ENCOUNTER — HOSPITAL ENCOUNTER (OUTPATIENT)
Age: 69
Discharge: HOME | End: 2024-11-21
Payer: MEDICARE

## 2024-11-19 DIAGNOSIS — R06.02 SHORTNESS OF BREATH: ICD-10-CM

## 2024-11-19 DIAGNOSIS — I35.0 NONRHEUMATIC AORTIC VALVE STENOSIS: ICD-10-CM

## 2024-11-19 DIAGNOSIS — Z98.890 S/P THORACENTESIS: ICD-10-CM

## 2024-11-19 DIAGNOSIS — R01.1 HEART MURMUR: ICD-10-CM

## 2024-11-19 LAB
BILIRUB UR QL STRIP: NEGATIVE
CLARITY UR: CLEAR
COLOR UR: YELLOW
COMMENT: NORMAL
ECHO AO ASC DIAM: 2.7 CM
ECHO AO ROOT DIAM: 2.8 CM
ECHO AV CUSP MM: 1.6 CM
ECHO EST RA PRESSURE: 8 MMHG
ECHO LA DIAMETER: 3 CM
ECHO LA TO AORTIC ROOT RATIO: 1.07
ECHO LA VOL A-L A4C: 52 ML (ref 22–52)
ECHO LA VOL MOD A4C: 48 ML (ref 22–52)
ECHO LV E' LATERAL VELOCITY: 10.9 CM/S
ECHO LV E' SEPTAL VELOCITY: 9.98 CM/S
ECHO LV EDV A4C: 79 ML
ECHO LV EJECTION FRACTION A4C: 78 %
ECHO LV EJECTION FRACTION BIPLANE: 78 % (ref 55–100)
ECHO LV ESV A4C: 18 ML
ECHO LV INTERNAL DIMENSION DIASTOLIC MMODE: 4.7 CM (ref 3.9–5.3)
ECHO LV INTERNAL DIMENSION SYSTOLIC MMODE: 2.4 CM
ECHO LV IVSD MMODE: 1.2 CM (ref 0.6–0.9)
ECHO LV IVSS MMODE: 1.2 CM
ECHO LV POSTERIOR WALL DIASTOLIC MMODE: 1 CM (ref 0.6–0.9)
ECHO LV POSTERIOR WALL SYSTOLIC MMODE: 1.2 CM
ECHO LVOT AREA: 3.1 CM2
ECHO LVOT DIAM: 2 CM
ECHO MV A VELOCITY: 0.66 M/S
ECHO MV E DECELERATION TIME (DT): 243.1 MS
ECHO MV E VELOCITY: 1.25 M/S
ECHO MV E/A RATIO: 1.89
ECHO MV E/E' LATERAL: 11.47
ECHO MV E/E' RATIO (AVERAGED): 12
ECHO MV E/E' SEPTAL: 12.53
ECHO PV MAX VELOCITY: 1.1 M/S
ECHO PV PEAK GRADIENT: 5 MMHG
ECHO RIGHT VENTRICULAR SYSTOLIC PRESSURE (RVSP): 29 MMHG
ECHO RVOT AREA: 8 CM2
ECHO RVOT DIAMETER: 3.2 CM
ECHO TV REGURGITANT MAX VELOCITY: 2.31 M/S
ECHO TV REGURGITANT PEAK GRADIENT: 21 MMHG
GLUCOSE UR STRIP-MCNC: NEGATIVE MG/DL
HGB UR QL STRIP.AUTO: NEGATIVE
KETONES UR STRIP-MCNC: NEGATIVE MG/DL
LEUKOCYTE ESTERASE UR QL STRIP: NEGATIVE
NITRITE UR QL STRIP: NEGATIVE
PH UR STRIP: 6.5 [PH] (ref 5–8)
PROT UR STRIP-MCNC: NEGATIVE MG/DL
SP GR UR STRIP: 1.01 (ref 1–1.03)
UROBILINOGEN UR STRIP-ACNC: NORMAL EU/DL (ref 0–1)

## 2024-11-19 PROCEDURE — 81003 URINALYSIS AUTO W/O SCOPE: CPT

## 2024-11-19 PROCEDURE — 93306 TTE W/DOPPLER COMPLETE: CPT

## 2024-12-06 ENCOUNTER — OFFICE VISIT (OUTPATIENT)
Dept: PRIMARY CARE CLINIC | Age: 69
End: 2024-12-06

## 2024-12-06 VITALS
WEIGHT: 155 LBS | BODY MASS INDEX: 24.91 KG/M2 | OXYGEN SATURATION: 99 % | HEART RATE: 65 BPM | DIASTOLIC BLOOD PRESSURE: 84 MMHG | HEIGHT: 66 IN | SYSTOLIC BLOOD PRESSURE: 130 MMHG

## 2024-12-06 DIAGNOSIS — F41.9 ANXIETY: ICD-10-CM

## 2024-12-06 DIAGNOSIS — Z87.2 HX OF DISCOID LUPUS ERYTHEMATOSUS: ICD-10-CM

## 2024-12-06 DIAGNOSIS — E78.2 MIXED HYPERLIPIDEMIA: ICD-10-CM

## 2024-12-06 DIAGNOSIS — I10 ESSENTIAL HYPERTENSION: ICD-10-CM

## 2024-12-06 DIAGNOSIS — Z98.890 S/P THORACENTESIS: Primary | ICD-10-CM

## 2024-12-06 NOTE — PATIENT INSTRUCTIONS
THANK YOU FOR TRUSTING US WITH YOUR HEALTHCARE !!    The associates caring for you today were:    Family Practice Provider: Kelly RUELAS-ANEESH    Clinical Team Nurse: Pippa Tobar LPN    Clinical Team Medical Assistant: Sheri Chavez MA    Our goal is to provide you with EXCEPTIONAL patient care, and we strive to do this for EVERY patient, EVERY visit. Since we care about you and your experience, you may receive a patient satisfaction survey from Ryann Wahl by postal mail/email/text. We truly welcome your feedback and ask that you complete the survey to let us know how we are doing.    Important Numbers:  [facility] phone [phone]   [place] Office Nurse/MA Line: [phone]  Fax  [phone]   Central [phone]  Billing questions: [phone]  Medical Records Request: [phone]    Manage your healthcare  with [facility]. To activate your account, visit [email or web address]   DIGITAL scheduling available now through my chart.  Schedule your next appointment at your convenience through your my chart.       [place] Office Hours:  Monday: [place] office location 8-5 ([phone]) Offering additional late hours the first Monday of the month until 7 pm.   Tuesday: 8: :  812 Noon, closed  of the month   : 7:30-4:30   SURVEY:    You may be receiving a survey from Ryann Wahl regarding your visit today.    Please complete the survey to enable us to provide the highest quality of care to you and your family.    If you cannot score us a very good on any question, please call the office to discuss how we could have made your experience a very good one.    Thank you.

## 2024-12-06 NOTE — PROGRESS NOTES
Is doing well status post left thoracentesis, without any complaints  Has been going back to the gym - wants to do more things at the gym  Has been using electric sweeper  No pain or breathing difficulties, no shortness of breath, has been able to up and down steps  Taking Lasix once/week - lost 3 pounds since yesterday  Augmentin causes bowel incontinence

## 2024-12-06 NOTE — PROGRESS NOTES
[facility] [place]  [address] [address]  Dept: [phone]  Dept Fax: [phone]    Last encounter 11/8/2024    Follow-up (1 month f/u. Pt states she feels good today, started back at gym with some light exercising)       HPI:   Dai Sanchez is a 69 y.o. female who presentstoday for her medical conditions/complaints as noted below.  Dai Sanchez is c/o of Follow-up (1 month f/u. Pt states she feels good today, started back at gym with some light exercising)      HPI  Established patient      69 year old female     Follow up pulmonology     Right hip bothering her wants to go to chiropractor , will bring copy of xrays     No abdominal pain   Breathing normal no concerns.    Exercising at gym , wants to add upper body weights.     Pseudomonas UTI treated with augmentin tolerated well, side effect diarrhea     CT chest with contrast 10/30/2024 as outpatient showed moderate large left pleural effusion about 40% of the left hemithorax volume. CBC, CMP and troponin unremarkable at [facility]. . S/p thora on 11/1/24 with 1050ml straw fluid, exudative per Light[facility]. 2.4k WBC, 8.6k RBC in pleural fluid, lymphocytic with negative flow cytometry. Fluid Cx 11/1/24 NGTD. Resp PCR negative. Suspected reactive from splenic hematoma    Reviewed prior notes None  Reviewed previous Labs, Imaging, and Hospital Records    Past Medical History:   Diagnosis Date   • Arthritis     follow with Dr. Stovall   • Cancer (HCC)     melanoma on chest   • Cervical spondylosis 2023   • DDD (degenerative disc disease), cervical 2023    C4-5,C5-6, C6-7   • Discoid lupus    • Hay fever    • Hormone disorder    • Hypertension    • Lipoma 2009   • Retinal detachment with retinal defect of left eye 07/2023    Dr. Alcantara   • Rosacea       Past Surgical History:   Procedure Laterality Date   • ANTERIOR CERVICAL DISCECTOMY W/ FUSION  11/01/2023    ant fusion C4-C7

## 2024-12-18 DIAGNOSIS — R32 URINARY INCONTINENCE, UNSPECIFIED TYPE: ICD-10-CM

## 2024-12-19 RX ORDER — TOLTERODINE 4 MG/1
4 CAPSULE, EXTENDED RELEASE ORAL DAILY
Qty: 90 CAPSULE | Refills: 3 | Status: SHIPPED | OUTPATIENT
Start: 2024-12-19

## 2024-12-19 NOTE — TELEPHONE ENCOUNTER
Last OV: 12/6/2024  Last RX: 7/19/2024   Next scheduled apt: 3/7/2025    Surescripts requesting refill

## 2025-01-12 DIAGNOSIS — F41.9 ANXIETY: ICD-10-CM

## 2025-01-12 RX ORDER — CLORAZEPATE DIPOTASSIUM 7.5 MG/1
7.5 TABLET ORAL DAILY
Qty: 90 TABLET | Refills: 0 | Status: SHIPPED | OUTPATIENT
Start: 2025-01-12 | End: 2025-04-12

## 2025-01-30 ENCOUNTER — OFFICE VISIT (OUTPATIENT)
Dept: PULMONOLOGY | Age: 70
End: 2025-01-30
Payer: MEDICARE

## 2025-01-30 ENCOUNTER — HOSPITAL ENCOUNTER (OUTPATIENT)
Dept: GENERAL RADIOLOGY | Age: 70
Discharge: HOME | End: 2025-02-01
Attending: INTERNAL MEDICINE
Payer: MEDICARE

## 2025-01-30 ENCOUNTER — HOSPITAL ENCOUNTER (OUTPATIENT)
Age: 70
Discharge: HOME | End: 2025-02-01
Payer: MEDICARE

## 2025-01-30 VITALS
RESPIRATION RATE: 18 BRPM | OXYGEN SATURATION: 97 % | BODY MASS INDEX: 25.02 KG/M2 | WEIGHT: 159.4 LBS | HEART RATE: 67 BPM | HEIGHT: 67 IN | SYSTOLIC BLOOD PRESSURE: 131 MMHG | TEMPERATURE: 97.6 F | DIASTOLIC BLOOD PRESSURE: 84 MMHG

## 2025-01-30 DIAGNOSIS — J90 PLEURAL EFFUSION ON LEFT: Primary | ICD-10-CM

## 2025-01-30 DIAGNOSIS — J90 PLEURAL EFFUSION ON LEFT: ICD-10-CM

## 2025-01-30 DIAGNOSIS — Z87.891 PERSONAL HISTORY OF SMOKING: ICD-10-CM

## 2025-01-30 PROCEDURE — 1123F ACP DISCUSS/DSCN MKR DOCD: CPT | Performed by: INTERNAL MEDICINE

## 2025-01-30 PROCEDURE — G8420 CALC BMI NORM PARAMETERS: HCPCS | Performed by: INTERNAL MEDICINE

## 2025-01-30 PROCEDURE — G8427 DOCREV CUR MEDS BY ELIG CLIN: HCPCS | Performed by: INTERNAL MEDICINE

## 2025-01-30 PROCEDURE — 1090F PRES/ABSN URINE INCON ASSESS: CPT | Performed by: INTERNAL MEDICINE

## 2025-01-30 PROCEDURE — G8399 PT W/DXA RESULTS DOCUMENT: HCPCS | Performed by: INTERNAL MEDICINE

## 2025-01-30 PROCEDURE — 3075F SYST BP GE 130 - 139MM HG: CPT | Performed by: INTERNAL MEDICINE

## 2025-01-30 PROCEDURE — 99213 OFFICE O/P EST LOW 20 MIN: CPT | Performed by: INTERNAL MEDICINE

## 2025-01-30 PROCEDURE — 3017F COLORECTAL CA SCREEN DOC REV: CPT | Performed by: INTERNAL MEDICINE

## 2025-01-30 PROCEDURE — 99204 OFFICE O/P NEW MOD 45 MIN: CPT | Performed by: INTERNAL MEDICINE

## 2025-01-30 PROCEDURE — 1036F TOBACCO NON-USER: CPT | Performed by: INTERNAL MEDICINE

## 2025-01-30 PROCEDURE — 1126F AMNT PAIN NOTED NONE PRSNT: CPT | Performed by: INTERNAL MEDICINE

## 2025-01-30 PROCEDURE — 3078F DIAST BP <80 MM HG: CPT | Performed by: INTERNAL MEDICINE

## 2025-01-30 PROCEDURE — 1159F MED LIST DOCD IN RCRD: CPT | Performed by: INTERNAL MEDICINE

## 2025-01-30 PROCEDURE — 71046 X-RAY EXAM CHEST 2 VIEWS: CPT

## 2025-01-30 NOTE — PATIENT INSTRUCTIONS
SURVEY:    Thank you for allowing us to care for you today.    You may be receiving a survey from [facility] regarding your visit today- electronically or via mail.      Please help us by completing the survey as this will provide the needed feedback to ensure we are providing the very best care for you and your family.    If you cannot score us a very good on any question, please call the office to discuss how we could have made your experience a very good one.    Thank you.       STAFF:    Kenyetta Pinto, Delia TURNER      CLINICAL STAFF:    Jennifer HUYNH, Caprice TURNER, Eulalia TURNER, Myrna HUYNH

## 2025-01-30 NOTE — PROGRESS NOTES
Influenza, FLUAD, (age 65 y+), IM, Trivalent PF, 0.5mL 10/17/2018, 10/25/2019    Influenza, FLUARIX, FLULAVAL, FLUZONE (age 6 mo+) and AFLURIA, (age 3 y+), Quadv PF, 0.5mL 11/04/2015, 10/04/2017, 09/11/2020    Influenza, FLUZONE High Dose (age 65 y+), IM, Quadv, 0.7mL 10/10/2022    Influenza, FLUZONE High Dose, (age 65 y+), IM, Trivalent PF, 0.5mL 10/25/2024    Pneumococcal Conjugate 7-valent (Prevnar7) 10/23/2012    Pneumococcal, PCV-13, PREVNAR 13, (age 6w+), IM, 0.5mL 11/04/2015    Pneumococcal, PCV20, PREVNAR 20, (age 6w+), IM, 0.5mL 06/10/2022    Pneumococcal, PPSV23, PNEUMOVAX 23, (age 2y+), SC/IM, 0.5mL 12/07/2016    RSV, ABRYSVO, (Pregnant or age 60y+), PF, IM, 0.5mL 10/06/2023    TDaP, ADACEL (age 10y-64y), BOOSTRIX (age 10y+), IM, 0.5mL 04/12/2017, 12/10/2018    Zoster Live (Zostavax) 11/04/2015    Zoster Recombinant (Shingrix) 11/04/2015, 02/18/2019, 04/30/2019         REVIEW OF SYSTEMS:  CONSTITUTIONAL:  negative for  fevers, chills, sweats, fatigue, anorexia, and weight loss  EYES:  negative for  double vision, blurred vision, dry eyes, eye discharge, visual disturbance, redness, and icterus  HEENT:  negative for  hearing loss, tinnitus, ear drainage, earaches, nasal congestion, epistaxis, sore throat, hoarseness, voice change, and postnasal drip  RESPIRATORY:  negative for  dry cough, cough with sputum, dyspnea, wheezing, hemoptysis, chest pain, and pleuritic pain  CARDIOVASCULAR:  negative for  chest pain, dyspnea, palpitations, orthopnea, PND, exertional chest pressure/discomfort, fatigue, edema, syncope  GASTROINTESTINAL:  negative for nausea, vomiting, diarrhea, constipation, abdominal pain, abdominal mass, abdominal distention, jaundice, dysphagia, reflux, odynophagia, hematemesis, and hemtochezia  GENITOURINARY:  negative for frequency, dysuria, nocturia, and hematuria  HEMATOLOGIC/LYMPHATIC:  negative for easy bruising, bleeding, lymphadenopathy, and petechiae  ALLERGIC/IMMUNOLOGIC:  negative

## 2025-01-31 ENCOUNTER — TELEPHONE (OUTPATIENT)
Dept: PULMONOLOGY | Age: 70
End: 2025-01-31

## 2025-01-31 NOTE — TELEPHONE ENCOUNTER
Patient called back. I scheduled her an appt with Dr. Cunningham on 3-6-25 to go over CT results and then transferred her to centralized scheduling to make appt for the CT.

## 2025-01-31 NOTE — TELEPHONE ENCOUNTER
----- Message from Dr. Lincoln Cunningham MD sent at 1/30/2025  9:34 PM EST -----  I reviewed the chest x-ray which was done today I saw her in the office.  Chest x-ray was done after she had left.  I have talked to her about the results of the chest x-ray.  I have ordered a CT scan of the chest which can be done in next 2 weeks.  Please call the patient to schedule CT scan of the chest without contrast order is in the epic and schedule appointment with me in 4 to 6 weeks.

## 2025-01-31 NOTE — TELEPHONE ENCOUNTER
Called patient and left voicemail that CT chest will need scheduled, providing centralized scheduling phone number and then to call our office back to schedule a follow up appointment in approx 4-6 weeks.

## 2025-02-14 ENCOUNTER — HOSPITAL ENCOUNTER (OUTPATIENT)
Dept: CT IMAGING | Age: 70
Discharge: HOME | End: 2025-02-16
Attending: INTERNAL MEDICINE
Payer: MEDICARE

## 2025-02-14 DIAGNOSIS — J90 PLEURAL EFFUSION ON LEFT: ICD-10-CM

## 2025-02-14 PROCEDURE — 71250 CT THORAX DX C-: CPT

## 2025-03-06 ENCOUNTER — OFFICE VISIT (OUTPATIENT)
Dept: PULMONOLOGY | Age: 70
End: 2025-03-06
Payer: MEDICARE

## 2025-03-06 VITALS
SYSTOLIC BLOOD PRESSURE: 131 MMHG | OXYGEN SATURATION: 98 % | DIASTOLIC BLOOD PRESSURE: 74 MMHG | BODY MASS INDEX: 24.88 KG/M2 | HEART RATE: 68 BPM | HEIGHT: 67 IN | RESPIRATION RATE: 18 BRPM | WEIGHT: 158.5 LBS | TEMPERATURE: 97.8 F

## 2025-03-06 DIAGNOSIS — J90 PLEURAL EFFUSION ON LEFT: Primary | ICD-10-CM

## 2025-03-06 DIAGNOSIS — R91.8 OPACITY OF LUNG ON IMAGING STUDY: ICD-10-CM

## 2025-03-06 DIAGNOSIS — Z87.891 PERSONAL HISTORY OF SMOKING: ICD-10-CM

## 2025-03-06 PROCEDURE — 99213 OFFICE O/P EST LOW 20 MIN: CPT | Performed by: INTERNAL MEDICINE

## 2025-03-06 NOTE — PROGRESS NOTES
and atelectatic left lower lobe with pleural effusion on the left side, which is not seen on the CT scan area of nodular atelectasis on the CT scan of the chest on 10/30/2024.        Assessment and Plan       ICD-10-CM    1. Pleural effusion on left  J90       2. Personal history of smoking  Z87.891         Assessment & Plan  1. Opacity/nodular atelectasis, right lower lobe.  The CT scan of the chest done on 02/14/2025, shows a small plate-like atelectatic area on the left side and a nodular atelectatic area on the right side, just above the diaphragm. The previously seen large pleural effusion on the left side is no longer present. The nodular atelectatic area on the right side was also present on the CT scan of the abdomen done on 10/30/2024, and it appears stable. There is a calcified granuloma present on the right side. The patient has a history of smoking, which necessitates further monitoring. A follow-up CT scan of the chest will be scheduled in 6 months to monitor the nodular atelectatic area on the right side and right lower lobe lung field.    2.  History of left pleural effusion.  Upon reviewing the data from the [facility] hospital, it was noted that she had an exudative pleural effusion. The LDH body fluid was 269, and the protein level was 4.6 on 11/11/2024. The body fluid cell count was predominantly lymphocytic, with 90 percent lymphocytes. Flow cytometry did not reveal any monoclonal B cells. The LDH peripheral was 178, and the total protein was 6.3. A chest x-ray dated 11/08/2024 showed significant improvement in the left pleural effusion, with only mild pleural parenchymal thickening and mild pleural effusion, compared to the chest x-ray taken on 10/29/2024.    3.  History of smoking 20-pack-year.    Given her 20-year smoking history and cessation approximately 9 to 10 years ago, a low-dose CT scan of the chest is recommended for cancer screening.     4. Discoid lupus.  She is currently managing her

## 2025-03-06 NOTE — PATIENT INSTRUCTIONS
SURVEY:    Thank you for allowing us to care for you today.    You may be receiving a survey from [facility] regarding your visit today- electronically or via mail.      Please help us by completing the survey as this will provide the needed feedback to ensure we are providing the very best care for you and your family.    If you cannot score us a very good on any question, please call the office to discuss how we could have made your experience a very good one.    Thank you.       STAFF:    Kenyetta Pinto, Delia TURNER      CLINICAL STAFF:    Jennifer HUYNH, Caprice TURNER, Eulalia TURNER, Myrna HUYNH

## 2025-03-07 ENCOUNTER — OFFICE VISIT (OUTPATIENT)
Dept: PRIMARY CARE CLINIC | Age: 70
End: 2025-03-07

## 2025-03-07 VITALS
WEIGHT: 160 LBS | HEART RATE: 70 BPM | SYSTOLIC BLOOD PRESSURE: 138 MMHG | BODY MASS INDEX: 25.11 KG/M2 | OXYGEN SATURATION: 98 % | DIASTOLIC BLOOD PRESSURE: 80 MMHG | HEIGHT: 67 IN

## 2025-03-07 DIAGNOSIS — N39.3 STRESS INCONTINENCE: ICD-10-CM

## 2025-03-07 DIAGNOSIS — Z87.2 HX OF DISCOID LUPUS ERYTHEMATOSUS: ICD-10-CM

## 2025-03-07 DIAGNOSIS — I10 ESSENTIAL HYPERTENSION: ICD-10-CM

## 2025-03-07 DIAGNOSIS — E78.00 PURE HYPERCHOLESTEROLEMIA: ICD-10-CM

## 2025-03-07 DIAGNOSIS — I35.0 NONRHEUMATIC AORTIC VALVE STENOSIS: ICD-10-CM

## 2025-03-07 DIAGNOSIS — F41.9 ANXIETY: Primary | ICD-10-CM

## 2025-03-07 RX ORDER — AMLODIPINE BESYLATE 10 MG/1
10 TABLET ORAL DAILY
Qty: 90 TABLET | Refills: 3 | Status: SHIPPED | OUTPATIENT
Start: 2025-03-07

## 2025-03-07 RX ORDER — FUROSEMIDE 20 MG/1
20 TABLET ORAL
Qty: 20 TABLET | Refills: 0 | Status: SHIPPED | OUTPATIENT
Start: 2025-03-07

## 2025-03-07 SDOH — ECONOMIC STABILITY: FOOD INSECURITY: WITHIN THE PAST 12 MONTHS, YOU WORRIED THAT YOUR FOOD WOULD RUN OUT BEFORE YOU GOT MONEY TO BUY MORE.: NEVER TRUE

## 2025-03-07 SDOH — ECONOMIC STABILITY: FOOD INSECURITY: WITHIN THE PAST 12 MONTHS, THE FOOD YOU BOUGHT JUST DIDN'T LAST AND YOU DIDN'T HAVE MONEY TO GET MORE.: NEVER TRUE

## 2025-03-07 ASSESSMENT — PATIENT HEALTH QUESTIONNAIRE - PHQ9
2. FEELING DOWN, DEPRESSED OR HOPELESS: NOT AT ALL
SUM OF ALL RESPONSES TO PHQ QUESTIONS 1-9: 0
1. LITTLE INTEREST OR PLEASURE IN DOING THINGS: NOT AT ALL
SUM OF ALL RESPONSES TO PHQ QUESTIONS 1-9: 0

## 2025-03-07 NOTE — PATIENT INSTRUCTIONS
THANK YOU FOR TRUSTING US WITH YOUR HEALTHCARE !!    The associates caring for you today were:    Family Practice Provider: Kelly RUELAS-ANEESH    Clinical Team Nurse: Pippa Tobar LPN    Clinical Team Medical Assistant: Sheri Chavez MA    Our goal is to provide you with EXCEPTIONAL patient care, and we strive to do this for EVERY patient, EVERY visit. Since we care about you and your experience, you may receive a patient satisfaction survey from [facility] by postal mail/email/text. We truly welcome your feedback and ask that you complete the survey to let us know how we are doing.    Important Numbers:  [facility] phone [phone]   [place] Office Nurse/MA Line: [phone]  Fax  [phone]   Central [phone]  Billing questions: [phone]  Medical Records Request: [phone]    Manage your healthcare  with [facility] My[facility]. To activate your account, visit [email or web address]   DIGITAL scheduling available now through my chart.  Schedule your next appointment at your convenience through your my chart.       [place] Office Hours:  Monday: [place] office location 8-5 ([phone]) Offering additional late hours the first Monday of the month until 7 pm.   Tuesday: 8: :  812 Noon, closed  of the month   : 7:30-4:30   SURVEY:    You may be receiving a survey from [facility] regarding your visit today.    Please complete the survey to enable us to provide the highest quality of care to you and your family.    If you cannot score us a very good on any question, please call the office to discuss how we could have made your experience a very good one.    Thank you.

## 2025-03-07 NOTE — PROGRESS NOTES
[facility] [place]  [address] [place]. \[facility]"" [address]  Dept: [phone]  Dept Fax: [phone]    Last encounter 12/6/2024    Hypertension (3 month check )       HPI:   Dai Sanchez is a 69 y.o. female who presentstoday for her medical conditions/complaints as noted below.  Dai Sanchez is c/o of Hypertension (3 month check )      HPI  Established patient    Pt with concern for cyst near tailbone \"had it for years and just started to get bigger\" feels sore and agitating her now . Does not hurt just sore.     Htn - doing well bp controlled. Lsiinopril and amlodipine being taken, lasix used sparingly only with weight gain but pt using more often.     Hx left pleural effusion - thoracentesis     Hx discoid lupus seeing dermatology on plaquenil    Stress incontinence on detrol LA     Anxiety controlled with clorazepate, pt with psychiatry prior went out of network during covid 19   OARRS  monitored and pt has had good control. Sleeping well. No panic attacks. Consistent with medication at low dose.         Reviewed prior notes  dermatology , Orthopedics, and Pulmonary  Reviewed previous Labs, Imaging, and Hospital Records    Past Medical History:   Diagnosis Date    Arthritis     follow with Dr. Stovall    Cancer (HCC)     melanoma on chest    Cervical spondylosis 2023    DDD (degenerative disc disease), cervical 2023    C4-5,C5-6, C6-7    Discoid lupus     Hay fever     Hormone disorder     Hypertension     Lipoma 2009    Retinal detachment with retinal defect of left eye 07/2023    Dr. Quinton Biswas       Past Surgical History:   Procedure Laterality Date    ANTERIOR CERVICAL DISCECTOMY W/ FUSION  11/01/2023    ant fusion C4-C7 with cage, ant cervical discectomy and decompression C4-C5, C5-C6 and C6-C7  Dr. Bryan Zuniga at [facility]    BACK SURGERY  1988    L-5, bethany @ [place]    BLADDER SURGERY      COLONOSCOPY

## 2025-04-08 DIAGNOSIS — F41.9 ANXIETY: ICD-10-CM

## 2025-04-08 RX ORDER — CLORAZEPATE DIPOTASSIUM 7.5 MG/1
7.5 TABLET ORAL DAILY
Qty: 90 TABLET | Refills: 0 | Status: SHIPPED | OUTPATIENT
Start: 2025-04-08 | End: 2025-07-07

## 2025-05-12 RX ORDER — ROSUVASTATIN CALCIUM 20 MG/1
20 TABLET, COATED ORAL DAILY
Qty: 90 TABLET | Refills: 3 | Status: SHIPPED | OUTPATIENT
Start: 2025-05-12

## 2025-05-28 DIAGNOSIS — I10 ESSENTIAL HYPERTENSION: ICD-10-CM

## 2025-05-29 RX ORDER — LISINOPRIL 5 MG/1
5 TABLET ORAL DAILY
Qty: 90 TABLET | Refills: 3 | Status: SHIPPED | OUTPATIENT
Start: 2025-05-29

## 2025-05-29 NOTE — TELEPHONE ENCOUNTER
Last OV: 3/7/2025  Last RX: 7/18/2024   Next scheduled apt: 6/27/2025    Surescripts requesting refill

## 2025-06-27 ENCOUNTER — OFFICE VISIT (OUTPATIENT)
Dept: PRIMARY CARE CLINIC | Age: 70
End: 2025-06-27

## 2025-06-27 VITALS
DIASTOLIC BLOOD PRESSURE: 84 MMHG | WEIGHT: 158 LBS | BODY MASS INDEX: 24.8 KG/M2 | HEART RATE: 65 BPM | HEIGHT: 67 IN | OXYGEN SATURATION: 99 % | SYSTOLIC BLOOD PRESSURE: 128 MMHG

## 2025-06-27 DIAGNOSIS — E55.9 VITAMIN D DEFICIENCY: ICD-10-CM

## 2025-06-27 DIAGNOSIS — F41.9 ANXIETY: Primary | ICD-10-CM

## 2025-06-27 DIAGNOSIS — I10 ESSENTIAL HYPERTENSION: ICD-10-CM

## 2025-06-27 DIAGNOSIS — E78.00 HYPERCHOLESTEROLEMIA: ICD-10-CM

## 2025-06-27 DIAGNOSIS — Z79.899 ENCOUNTER FOR MONITORING STATIN THERAPY: ICD-10-CM

## 2025-06-27 DIAGNOSIS — Z13.29 SCREENING FOR THYROID DISORDER: ICD-10-CM

## 2025-06-27 DIAGNOSIS — I35.0 NONRHEUMATIC AORTIC VALVE STENOSIS: ICD-10-CM

## 2025-06-27 DIAGNOSIS — Z12.31 SCREENING MAMMOGRAM, ENCOUNTER FOR: ICD-10-CM

## 2025-06-27 DIAGNOSIS — Z51.81 ENCOUNTER FOR MONITORING STATIN THERAPY: ICD-10-CM

## 2025-06-27 DIAGNOSIS — Z13.0 SCREENING, ANEMIA, DEFICIENCY, IRON: ICD-10-CM

## 2025-06-27 RX ORDER — FUROSEMIDE 20 MG/1
20 TABLET ORAL
Qty: 36 TABLET | Refills: 1 | Status: SHIPPED | OUTPATIENT
Start: 2025-06-27

## 2025-06-27 RX ORDER — CLORAZEPATE DIPOTASSIUM 7.5 MG/1
7.5 TABLET ORAL DAILY
Qty: 90 TABLET | Refills: 0 | Status: SHIPPED | OUTPATIENT
Start: 2025-06-27 | End: 2025-09-25

## 2025-06-27 NOTE — PROGRESS NOTES
HPI Notes    Name: Dai Sanchez  : 1955         Chief Complaint:     Chief Complaint   Patient presents with    Anxiety     3 month     Hypertension     3 month        History of Present Illness:        HPI    69 year old female here today for routine check up htn, hyperlipidemia, anxiety,     Has doing ok, bp well controlled.   Pt helping with cousins,   Oldest daughter recently visited.     Breast cancer screening- mammogram ordered       Anxiety well controlled medication  Sleeping okay   Safety of benzo discussed. Risk dementia /injury reviewed.   OARRS monitored      Discoid lupus on plaquenil follows with dermatology     Urinary incontinence on detrol LA     Past Medical History:     Past Medical History:   Diagnosis Date    Arthritis     follow with Dr. Stovall    Cancer (HCC)     melanoma on chest    Cervical spondylosis     DDD (degenerative disc disease), cervical     C4-5,C5-6, C6-7    Discoid lupus     Hay fever     Hormone disorder     Hypertension     Lipoma     Retinal detachment with retinal defect of left eye 2023    Dr. Quinton Biswas       Reviewed all health maintenance requirements and ordered appropriate tests  Health Maintenance Due   Topic Date Due    COVID-19 Vaccine ( season) 2025       Past Surgical History:     Past Surgical History:   Procedure Laterality Date    ANTERIOR CERVICAL DISCECTOMY W/ FUSION  2023    ant fusion C4-C7 with cage, ant cervical discectomy and decompression C4-C5, C5-C6 and C6-C7  Dr. Bryan Zuniga at [facility]    BACK SURGERY      L-5, bethany @ [place]    BLADDER SURGERY      COLONOSCOPY  2024    Dr. Quesada:  2 hyperplastic polyps    COLONOSCOPY  2016    Dr. Quesada:  1 adenomatous polyp    COLONOSCOPY N/A 2019    Dr. Back:  Hyperplastic polyps    COLONOSCOPY N/A 2024    COLONOSCOPY POLYPECTOMY SNARE/BIOPSY performed by Keanu Quesada MD at [facility] ENDOSCOPY    FOOT SURGERY

## 2025-06-27 NOTE — PATIENT INSTRUCTIONS
THANK YOU FOR TRUSTING US WITH YOUR HEALTHCARE !!    The associates caring for you today were:    Family Practice Provider: Kelly RUELAS-ANEESH    Clinical Team Nurse: Pippa Tobar LPN    Clinical Team Medical Assistant: Sheri Chavez MA    Our goal is to provide you with EXCEPTIONAL patient care, and we strive to do this for EVERY patient, EVERY visit. Since we care about you and your experience, you may receive a patient satisfaction survey from [employer] [facility] by postal mail/email/text. We truly welcome your feedback and ask that you complete the survey to let us know how we are doing.    Important Numbers:  [facility] phone [phone]   [place] Office Nurse/MA Line: [phone]  Fax  [phone]   Central [phone]  Billing questions: [phone]  Medical Records Request: [phone]    Manage your healthcare  with [facility] My[facility]. To activate your account, visit [email or web address]   DIGITAL scheduling available now through my chart.  Schedule your next appointment at your convenience through your my chart.       [place] Office Hours:  Monday: [place] office location 8-5 ([phone]) Offering additional late hours the first Monday of the month until 7 pm.   Tuesday: 8: :  812 Noon, closed  of the month   : 7:30-4:30   SURVEY:    You may be receiving a survey from [facility] regarding your visit today.    Please complete the survey to enable us to provide the highest quality of care to you and your family.    If you cannot score us a very good on any question, please call the office to discuss how we could have made your experience a very good one.    Thank you.

## 2025-06-30 ENCOUNTER — HOSPITAL ENCOUNTER (OUTPATIENT)
Age: 70
Discharge: HOME | End: 2025-06-30
Payer: MEDICARE

## 2025-06-30 DIAGNOSIS — Z13.0 SCREENING, ANEMIA, DEFICIENCY, IRON: ICD-10-CM

## 2025-06-30 DIAGNOSIS — Z13.29 SCREENING FOR THYROID DISORDER: ICD-10-CM

## 2025-06-30 DIAGNOSIS — E55.9 VITAMIN D DEFICIENCY: ICD-10-CM

## 2025-06-30 DIAGNOSIS — E78.00 HYPERCHOLESTEROLEMIA: ICD-10-CM

## 2025-06-30 DIAGNOSIS — Z51.81 ENCOUNTER FOR MONITORING STATIN THERAPY: ICD-10-CM

## 2025-06-30 DIAGNOSIS — Z79.899 ENCOUNTER FOR MONITORING STATIN THERAPY: ICD-10-CM

## 2025-06-30 LAB
25(OH)D3 SERPL-MCNC: 63.9 NG/ML (ref 30–100)
ALBUMIN SERPL-MCNC: 4.2 G/DL (ref 3.5–5.2)
ALBUMIN/GLOB SERPL: 1.7 {RATIO} (ref 1–2.5)
ALP SERPL-CCNC: 62 U/L (ref 35–104)
ALT SERPL-CCNC: 19 U/L (ref 5–33)
ANION GAP SERPL CALCULATED.3IONS-SCNC: 11 MMOL/L (ref 9–17)
AST SERPL-CCNC: 33 U/L
BASOPHILS # BLD: 0.05 K/UL (ref 0–0.2)
BASOPHILS NFR BLD: 1 % (ref 0–2)
BILIRUB SERPL-MCNC: 0.3 MG/DL (ref 0.3–1.2)
BUN SERPL-MCNC: 28 MG/DL (ref 8–23)
CALCIUM SERPL-MCNC: 10 MG/DL (ref 8.6–10.4)
CHLORIDE SERPL-SCNC: 107 MMOL/L (ref 98–107)
CHOLEST SERPL-MCNC: 151 MG/DL (ref 0–199)
CHOLESTEROL/HDL RATIO: 2
CO2 SERPL-SCNC: 23 MMOL/L (ref 20–31)
CREAT SERPL-MCNC: 1 MG/DL (ref 0.5–0.9)
EOSINOPHIL # BLD: 0.06 K/UL (ref 0–0.4)
EOSINOPHILS RELATIVE PERCENT: 1 % (ref 0–5)
ERYTHROCYTE [DISTWIDTH] IN BLOOD BY AUTOMATED COUNT: 14.2 % (ref 12.1–15.2)
GFR, ESTIMATED: 60 ML/MIN/1.73M2
GLUCOSE SERPL-MCNC: 91 MG/DL (ref 70–99)
HCT VFR BLD AUTO: 41 % (ref 36–46)
HDLC SERPL-MCNC: 74 MG/DL
HGB BLD-MCNC: 13.4 G/DL (ref 12–16)
IMM GRANULOCYTES # BLD AUTO: 0.01 K/UL (ref 0–0.3)
IMM GRANULOCYTES NFR BLD: 0 % (ref 0–5)
LDLC SERPL CALC-MCNC: 65 MG/DL (ref 0–100)
LYMPHOCYTES NFR BLD: 1.17 K/UL (ref 1–4.8)
LYMPHOCYTES RELATIVE PERCENT: 21 % (ref 15–40)
MCH RBC QN AUTO: 29.7 PG (ref 26–34)
MCHC RBC AUTO-ENTMCNC: 32.7 G/DL (ref 31–37)
MCV RBC AUTO: 90.9 FL (ref 80–100)
MONOCYTES NFR BLD: 0.57 K/UL (ref 0–1)
MONOCYTES NFR BLD: 10 % (ref 4–8)
NEUTROPHILS NFR BLD: 67 % (ref 47–75)
NEUTS SEG NFR BLD: 3.76 K/UL (ref 2.5–7)
PLATELET # BLD AUTO: 164 K/UL (ref 140–450)
PMV BLD AUTO: 10.5 FL (ref 6–12)
POTASSIUM SERPL-SCNC: 4.1 MMOL/L (ref 3.7–5.3)
PROT SERPL-MCNC: 6.7 G/DL (ref 6.4–8.3)
RBC # BLD AUTO: 4.51 M/UL (ref 4–5.2)
SODIUM SERPL-SCNC: 141 MMOL/L (ref 135–144)
TRIGL SERPL-MCNC: 60 MG/DL
TSH SERPL DL<=0.05 MIU/L-ACNC: 1.45 UIU/ML (ref 0.27–4.2)
VLDLC SERPL CALC-MCNC: 12 MG/DL (ref 1–30)
WBC OTHER # BLD: 5.6 K/UL (ref 3.5–11)

## 2025-06-30 PROCEDURE — 85025 COMPLETE CBC W/AUTO DIFF WBC: CPT

## 2025-06-30 PROCEDURE — 82306 VITAMIN D 25 HYDROXY: CPT

## 2025-06-30 PROCEDURE — 80053 COMPREHEN METABOLIC PANEL: CPT

## 2025-06-30 PROCEDURE — 80061 LIPID PANEL: CPT

## 2025-06-30 PROCEDURE — 36415 COLL VENOUS BLD VENIPUNCTURE: CPT

## 2025-06-30 PROCEDURE — 84443 ASSAY THYROID STIM HORMONE: CPT

## 2025-07-01 ASSESSMENT — ENCOUNTER SYMPTOMS
SHORTNESS OF BREATH: 0
WHEEZING: 0
COUGH: 0
SORE THROAT: 0
EYES NEGATIVE: 1
CHEST TIGHTNESS: 0

## 2025-07-07 ENCOUNTER — RESULTS FOLLOW-UP (OUTPATIENT)
Dept: PRIMARY CARE CLINIC | Age: 70
End: 2025-07-07

## 2025-07-10 NOTE — TELEPHONE ENCOUNTER
Last OV: 5/25/2022    Next scheduled apt: 10/5/2022        Surescripts requesting refill 59 yo F w PMHx of autoimmune vasculitis, asthma, GERD, osteoporosis, valvular heart disease, pernicious anemia, HTN, hypothyroid, sciatica, hx of bowel perforation in 02/2021 with jejunum bowel resection and primary anastomosis w Dr Stanley, hx of multiple admission for SBO in 2021, 2022 and last in 2024 managed non-operatively, presents with abdominal pain since 2130 last night and vomiting. Vomited last night but non today. Patient had bowel movement yesterday. denies any flatus Does not feel distended. No fever, chills, dysuria, or hematuria. No blood in BM's.    In ED; initially tachycardic; now sinus rythm, leukocytosis to 11.5. CT A/P w  oral contrast shows SBO with transition point at anastomosis site; like previous admissions.     P:  - Repeat AXR 6hours after CT  - NPO  - IVF  - If starts vomiting will consider NGT  - MARLENE  - mROBF  - Pain and nausea control PRN      Plan discussed with Dr Stanley

## 2025-07-31 RX ORDER — FUROSEMIDE 20 MG/1
TABLET ORAL
Qty: 39 TABLET | Refills: 3 | Status: SHIPPED | OUTPATIENT
Start: 2025-07-31

## 2025-07-31 NOTE — TELEPHONE ENCOUNTER
Last OV: 6/27/2025  Last RX: 6/27/2025   Next scheduled apt: 9/26/2025    Surescripts requesting refill

## 2025-08-20 ENCOUNTER — HOSPITAL ENCOUNTER (OUTPATIENT)
Dept: MAMMOGRAPHY | Age: 70
Discharge: HOME | End: 2025-08-22
Payer: MEDICARE

## 2025-08-20 DIAGNOSIS — Z12.31 SCREENING MAMMOGRAM, ENCOUNTER FOR: ICD-10-CM

## 2025-08-20 PROCEDURE — 77067 SCR MAMMO BI INCL CAD: CPT

## (undated) DEVICE — INTEGUSEAL MICROBIAL SEALANT: Brand: AVANOS

## (undated) DEVICE — 2T11 #2 PDO 36 X 36: Brand: 2T11 #2 PDO 36 X 36

## (undated) DEVICE — SYRINGE NDL SFTY REG BVL PLAS LL DETACH 3ML 21GAX1 1 2IN

## (undated) DEVICE — SPONGE LAP W18XL18IN WHT COT 4 PLY FLD STRUNG RADPQ DISP ST

## (undated) DEVICE — ZIMMER® STERILE DISPOSABLE TOURNIQUET CUFF WITH PLC, SINGLE PORT, SINGLE BLADDER, 30 IN. (76 CM)

## (undated) DEVICE — BANDAGE GZ W2XL75IN ST RAYON POLY CNFRM STRTCH LTWT

## (undated) DEVICE — HANDPIECE SET WITH HIGH FLOW TIP AND SUCTION TUBE: Brand: INTERPULSE

## (undated) DEVICE — PADDING CAST W6INXL4YD POLY POR SPUN DACRON SYN VERSATILE

## (undated) DEVICE — INTENDED FOR TISSUE SEPARATION, AND OTHER PROCEDURES THAT REQUIRE A SHARP SURGICAL BLADE TO PUNCTURE OR CUT.: Brand: BARD-PARKER ® STAINLESS STEEL BLADES

## (undated) DEVICE — 4-PORT MANIFOLD: Brand: NEPTUNE 2

## (undated) DEVICE — GLOVE SURG SZ 85 L12IN FNGR THK13MIL WHT ISOLEX POLYISOPRENE

## (undated) DEVICE — GLOVE SURG SZ 8 L12IN THK75MIL DK GRN LTX FREE

## (undated) DEVICE — BANDAGE COBAN 6 IN WND 6INX5YD FOAM

## (undated) DEVICE — GOWN,AURORA,NONRNF,XL,30/CS: Brand: MEDLINE

## (undated) DEVICE — HAND AND FT PK

## (undated) DEVICE — STOCKINETTE ORTHOPEDIC TBLR 25 YDX4 IN 1 PLY COTTON NS LTX

## (undated) DEVICE — SOLUTION IV IRRIG POUR BRL 0.9% SODIUM CHL 2F7124

## (undated) DEVICE — CONTROL SYRINGE LUER-LOCK TIP: Brand: MONOJECT

## (undated) DEVICE — CUFF CRYO 15-23IN CIRC M KNEE COOL PD TB GRAV FLO W/O BD

## (undated) DEVICE — SUTURE PROL SZ 4-0 L18IN NONABSORBABLE BLU L19MM PS-2 3/8 8682G

## (undated) DEVICE — SUTURE MCRYL SZ 4-0 L27IN ABSRB UD RB-1 L17MM 1/2 CIR Y214H

## (undated) DEVICE — T4 HOOD

## (undated) DEVICE — YANKAUER OPEN TIP WITH ON/OFF SWITCH: Brand: ARGYLE

## (undated) DEVICE — 3M™ IOBAN™ 2 ANTIMICROBIAL INCISE DRAPE 6651EZ: Brand: IOBAN™ 2

## (undated) DEVICE — SUTURE VCRL SZ 3-0 L27IN ABSRB UD L19MM PS-2 3/8 CIR PRIM J427H

## (undated) DEVICE — Z DISCONTINUED USE 2624853 GLOVE SURG SZ 75 L12IN THK91MIL BRN LTX FREE

## (undated) DEVICE — CEMENT MIXING SYSTEM WITH FEMORAL BREAKWAY NOZZLE: Brand: REVOLUTION

## (undated) DEVICE — FORCEPS BX L240CM JAW DIA2.8MM L CAP W/ NDL MIC MESH TOOTH

## (undated) DEVICE — STRYKER PERFORMANCE SERIES SAGITTAL BLADE: Brand: STRYKER PERFORMANCE SERIES

## (undated) DEVICE — SYRINGE MED 50ML LUERSLIP TIP

## (undated) DEVICE — INTENDED FOR TISSUE SEPARATION, AND OTHER PROCEDURES THAT REQUIRE A SHARP SURGICAL BLADE TO PUNCTURE OR CUT.: Brand: BARD-PARKER ® CARBON RIB-BACK BLADES

## (undated) DEVICE — Device: Brand: DEFENDO VALVE AND CONNECTOR KIT

## (undated) DEVICE — MEDI-VAC NON-CONDUCTIVE SUCTION TUBING 6MM X 6.1M (20 FT.) L: Brand: CARDINAL HEALTH

## (undated) DEVICE — TOTAL KNEE-LF: Brand: MEDLINE INDUSTRIES, INC.

## (undated) DEVICE — SYRINGE MED 10ML LUERLOCK TIP W/O SFTY DISP

## (undated) DEVICE — PRECISION THIN, OFFSET (5.5 X 0.38 X 25.0MM)

## (undated) DEVICE — 450 ML BOTTLE OF 0.05% CHLORHEXIDINE GLUCONATE IN 99.95% STERILE WATER FOR IRRIGATION, USP AND APPLICATOR.: Brand: IRRISEPT ANTIMICROBIAL WOUND LAVAGE

## (undated) DEVICE — Z DISCONTINUED PER MEDLINE USE 2741943 DRESSING AQUACEL 10 IN ALG W9XL25CM SIL CVR WTRPRF VIR BACT BARR ANTIMIC

## (undated) DEVICE — DRESSING PETRO W3XL3IN OIL EMUL N ADH GZ KNIT IMPREG CELOS

## (undated) DEVICE — RECIPROCATING BLADE, DOUBLE SIDED, OFFSET  (70.0 X 0.64 X 12.6MM)

## (undated) DEVICE — FORCEPS BX L240CM JAW DIA2.2MM RAD JAW 4 HOT DISP

## (undated) DEVICE — BANDAGE,ELASTIC,ESMARK,STERILE,4"X9',LF: Brand: MEDLINE

## (undated) DEVICE — NEEDLE HYPO 18GA L1.5IN PNK POLYPR HUB S STL REG BVL STR

## (undated) DEVICE — FLUFF UNDERPAD,MODERATE: Brand: WINGS

## (undated) DEVICE — NEEDLE FLTR 19GA L1.5IN WALL THK5UM BRN POLYPR HUB S STL

## (undated) DEVICE — ENDO KIT W/SYRINGE: Brand: MEDLINE INDUSTRIES, INC.

## (undated) DEVICE — GOWN,AURORA,NON-REINFORCED,2XL: Brand: MEDLINE

## (undated) DEVICE — HOOK LOCK LATEX FREE ELASTIC BANDAGE 15 YD 6IN

## (undated) DEVICE — Z INACTIVE USE 2660664 SOLUTION IRRIG 3000ML 0.9% SOD CHL USP UROMATIC PLAS CONT

## (undated) DEVICE — ERASECAUTI INTERMIT TRAY: Brand: MEDLINE INDUSTRIES, INC.

## (undated) DEVICE — ZIMMER® STERILE DISPOSABLE TOURNIQUET CUFF WITH PLC, SINGLE PORT, SINGLE BLADDER, 12 IN. (30 CM)

## (undated) DEVICE — DRAPE SHEET, X-LARGE: Brand: CONVERTORS

## (undated) DEVICE — SPONGE,LAP,18"X18",DLX,XR,ST,5/PK,40/PK: Brand: MEDLINE

## (undated) DEVICE — SUTURE 2-0 L24CM ABSRB VIO L26MM 3/8 CIR DMND PT NDL RA1028Q

## (undated) DEVICE — JELLY LUBRICATING 4OZ FLIP TOP TB E Z

## (undated) DEVICE — GLOVE SURG SZ 85 CRM LTX FREE POLYISOPRENE POLYMER BEAD ANTI

## (undated) DEVICE — CONMED ACCESSORY ELECTRODE, STANDARD FLAT BLADE: Brand: CONMED

## (undated) DEVICE — SOLUTION SURG PREP POV IOD 7.5% 4 OZ

## (undated) DEVICE — NEEDLE HYPO 25GA L1.5IN BLU POLYPR HUB S STL REG BVL STR

## (undated) DEVICE — Z DISCONTINUED BY MEDLINE USE 2711682 TRAY SKIN PREP DRY W/ PREM GLV

## (undated) DEVICE — DBD-PACK,LAPAROTOMY,2 REINFORCED GOWNS: Brand: MEDLINE

## (undated) DEVICE — SOLUTION PREP POVIDONE IOD FOR SKIN MUCOUS MEM PRIOR TO

## (undated) DEVICE — DRAPE, HEAVY DUTY, STERILE. FOR A 6' BIG CASE BACK TABLE MODEL 429: Brand: OR SPECIFIC

## (undated) DEVICE — BRIEF INCONT AD L FOR 45-58IN WAIST UNISX SUP ABSRB POLYMER